# Patient Record
Sex: MALE | Race: WHITE | HISPANIC OR LATINO | Employment: OTHER | URBAN - METROPOLITAN AREA
[De-identification: names, ages, dates, MRNs, and addresses within clinical notes are randomized per-mention and may not be internally consistent; named-entity substitution may affect disease eponyms.]

---

## 2017-02-04 RX ORDER — LEVOTHYROXINE SODIUM 50 UG/1
TABLET ORAL
Qty: 30 TABLET | Refills: 11 | Status: SHIPPED | OUTPATIENT
Start: 2017-02-04 | End: 2017-08-04 | Stop reason: SDUPTHER

## 2017-03-08 RX ORDER — FINASTERIDE 5 MG/1
TABLET, FILM COATED ORAL
Qty: 30 TABLET | Refills: 2 | Status: SHIPPED | OUTPATIENT
Start: 2017-03-08 | End: 2017-06-19 | Stop reason: SDUPTHER

## 2017-04-24 RX ORDER — LISINOPRIL AND HYDROCHLOROTHIAZIDE 12.5; 2 MG/1; MG/1
TABLET ORAL
Qty: 60 TABLET | Refills: 6 | Status: SHIPPED | OUTPATIENT
Start: 2017-04-24 | End: 2017-04-26

## 2017-04-26 RX ORDER — LISINOPRIL AND HYDROCHLOROTHIAZIDE 12.5; 2 MG/1; MG/1
TABLET ORAL
Qty: 60 TABLET | Refills: 6 | Status: SHIPPED | OUTPATIENT
Start: 2017-04-26 | End: 2017-08-04 | Stop reason: SDUPTHER

## 2017-06-19 RX ORDER — FINASTERIDE 5 MG/1
TABLET, FILM COATED ORAL
Qty: 30 TABLET | Refills: 2 | Status: SHIPPED | OUTPATIENT
Start: 2017-06-19 | End: 2017-08-04 | Stop reason: SDUPTHER

## 2017-06-29 ENCOUNTER — OFFICE VISIT (OUTPATIENT)
Dept: OPTOMETRY | Facility: CLINIC | Age: 82
End: 2017-06-29
Payer: MEDICARE

## 2017-06-29 DIAGNOSIS — H52.203 MYOPIA WITH ASTIGMATISM AND PRESBYOPIA, BILATERAL: ICD-10-CM

## 2017-06-29 DIAGNOSIS — H35.371 EPIRETINAL MEMBRANE, RIGHT: Primary | ICD-10-CM

## 2017-06-29 DIAGNOSIS — H52.4 MYOPIA WITH ASTIGMATISM AND PRESBYOPIA, BILATERAL: ICD-10-CM

## 2017-06-29 DIAGNOSIS — H52.13 MYOPIA WITH ASTIGMATISM AND PRESBYOPIA, BILATERAL: ICD-10-CM

## 2017-06-29 DIAGNOSIS — H04.123 BILATERAL DRY EYES: ICD-10-CM

## 2017-06-29 PROCEDURE — 99999 PR PBB SHADOW E&M-EST. PATIENT-LVL II: CPT | Mod: PBBFAC,,, | Performed by: OPTOMETRIST

## 2017-06-29 PROCEDURE — 92004 COMPRE OPH EXAM NEW PT 1/>: CPT | Mod: S$GLB,,, | Performed by: OPTOMETRIST

## 2017-06-29 PROCEDURE — 92015 DETERMINE REFRACTIVE STATE: CPT | Mod: S$GLB,,, | Performed by: OPTOMETRIST

## 2017-06-29 NOTE — PROGRESS NOTES
LILIA FERRARO 08/2012. Hasn't noticed any vision change. Glasses about 5 yrs.   Old.Has to renew 's license in July.  Eyes get tired after working long hours x mos    Last edited by Issac De La Cruz, OD on 6/29/2017  4:36 PM. (History)        ROS     Negative for: Constitutional, Gastrointestinal, Neurological, Skin,   Genitourinary, Musculoskeletal, HENT, Endocrine, Cardiovascular, Eyes,   Respiratory, Psychiatric, Allergic/Imm, Heme/Lymph    Last edited by Issac De La Cruz, OD on 6/29/2017  4:03 PM. (History)        Assessment /Plan     For exam results, see Encounter Report.    Epiretinal membrane, right    Myopia with astigmatism and presbyopia, bilateral    Bilateral dry eyes      1. Mild. Monitor condition. Patient to report any changes. RTC 1 year recheck.  2. Spec Rx given. Different lens options discussed with patient. RTC 1 year full exam.  3. Recommend artificial tears. 1 drop 1-2x per day. Chronicity of disease and treatment discussed.

## 2017-07-12 ENCOUNTER — TELEPHONE (OUTPATIENT)
Dept: INTERNAL MEDICINE | Facility: CLINIC | Age: 82
End: 2017-07-12

## 2017-07-12 DIAGNOSIS — I10 ESSENTIAL HYPERTENSION, BENIGN: Primary | ICD-10-CM

## 2017-07-12 DIAGNOSIS — E03.9 HYPOTHYROIDISM, UNSPECIFIED TYPE: ICD-10-CM

## 2017-07-12 NOTE — TELEPHONE ENCOUNTER
----- Message from Abdiaziz Jimenes sent at 7/12/2017  3:08 PM CDT -----  Contact: HRA  Good afternoon. Pt requesting a call to schedule appointment with Dr. Padilla. Thanks.

## 2017-08-04 ENCOUNTER — OFFICE VISIT (OUTPATIENT)
Dept: INTERNAL MEDICINE | Facility: CLINIC | Age: 82
End: 2017-08-04
Payer: MEDICARE

## 2017-08-04 VITALS
SYSTOLIC BLOOD PRESSURE: 140 MMHG | RESPIRATION RATE: 16 BRPM | HEART RATE: 51 BPM | HEIGHT: 64 IN | WEIGHT: 167.75 LBS | TEMPERATURE: 98 F | DIASTOLIC BLOOD PRESSURE: 76 MMHG | BODY MASS INDEX: 28.64 KG/M2

## 2017-08-04 DIAGNOSIS — E03.9 HYPOTHYROIDISM, UNSPECIFIED TYPE: ICD-10-CM

## 2017-08-04 DIAGNOSIS — I10 HTN (HYPERTENSION), BENIGN: ICD-10-CM

## 2017-08-04 DIAGNOSIS — Z00.00 ENCOUNTER FOR PREVENTIVE HEALTH EXAMINATION: Primary | ICD-10-CM

## 2017-08-04 PROCEDURE — 99397 PER PM REEVAL EST PAT 65+ YR: CPT | Mod: S$GLB,,, | Performed by: INTERNAL MEDICINE

## 2017-08-04 PROCEDURE — 99999 PR PBB SHADOW E&M-EST. PATIENT-LVL III: CPT | Mod: PBBFAC,,, | Performed by: INTERNAL MEDICINE

## 2017-08-04 PROCEDURE — 99499 UNLISTED E&M SERVICE: CPT | Mod: S$GLB,,, | Performed by: INTERNAL MEDICINE

## 2017-08-04 RX ORDER — LEVOTHYROXINE SODIUM 50 UG/1
50 TABLET ORAL DAILY
Qty: 30 TABLET | Refills: 11 | Status: SHIPPED | OUTPATIENT
Start: 2017-08-04 | End: 2018-08-26 | Stop reason: SDUPTHER

## 2017-08-04 RX ORDER — LISINOPRIL AND HYDROCHLOROTHIAZIDE 12.5; 2 MG/1; MG/1
2 TABLET ORAL DAILY
Qty: 60 TABLET | Refills: 6 | Status: SHIPPED | OUTPATIENT
Start: 2017-08-04 | End: 2018-06-01

## 2017-08-04 RX ORDER — FINASTERIDE 5 MG/1
5 TABLET, FILM COATED ORAL DAILY
Qty: 30 TABLET | Refills: 2 | Status: SHIPPED | OUTPATIENT
Start: 2017-08-04 | End: 2017-12-09 | Stop reason: SDUPTHER

## 2017-08-04 NOTE — PROGRESS NOTES
History of present illness:  84-year-old male in today for general health assessment.  Next    Current medication:  All medications noted and reviewed in the electronic medical record medication list.    Review of systems:  General: no fever, chills, generalized body aches. No unexpected weight loss.  Eyes:  No visual disturbances.  HEENT:  No hoarseness, dysphagia, ear pain.  Respiratory:  No cough, no shortness of breath.  Cardiovascular: no chest pain, palpitations, cough, exertional limb pain. No edema.  GI: no nausea, vomiting.  No abdominal pain. No change in bowel habits.  No melena, no hematochezia.  : no dysuria. No change in the color or character of the urine. No urinary frequency.  Musculoskeletal: He has seen orthopedics recently for knee pain.  Neurologic:  No focal neurological complaints.  No headaches.  Skin:  No rashes or other concerns.  Psych:  No emotional issues    Past medical history:  Hypothyroidism  Hypertension  BPH  Past surgical history, family medical history and social histories are all noted reviewed and are electronic medical record history sections.    Health screenings:  I exam June 2017.  He declines all immunizations.    Physical examination:  GENERAL:  Alert, appropriately groomed, no acute distress.  VS: Blood pressure taken manually by this examiner is 156/78.  EYES: sclerae white ,nonicteric. PERRL.  HEENT:  Normocephalic. Ear canals and tympanic membranes normal. Mouth and pharynx normal. No thyromegaly. Trachea midline and freely mobile.  LUNGS:  Clear to ascultation and normal to percussion.  CARDIOVASCULAR:  Normal heart sounds.  No significant murmur. Carotids full bilaterally without bruit.  Pedal pulses intact .  No abdominal bruit.  No peripheral extremity edema.  GI: the abdomen is soft, no distension. No masses , tenderness, organomegaly.  : Bilateral hydrocele.  Penis normal.  LYMPHATIC:  No axillary, inguinal , cervical adenopathy.  MUSCULOSKELETAL:  Range of  motion, stability and strength of the right and left upper and lower extremities normal. No swollen or tender joints  NEUROLOGIC:  DTR's normal. No gross motor or sensory deficits apparent, gait normal.  SKIN:  No rashes.   MS:  Alert, oriented , affect and mood all appropriate    Impression:  84-year-old male with history of hypertension control is uncertain.  He reports that his nurse daughter takes his blood pressure about every 2 weeks and states that it's okay.  Elevated reading here in the office.  Hypothyroidism on replacement therapy.  BPH.  Osteoarthritis.    Plan:  Update health maintenance laboratory data to include CBC chemistry profile lipid profile TSH urinalysis.  He is advised to start recording his blood pressure readings and his daughter takes and see us back in 2-3 months with those readings for review and recheck.  He declines any immunization updates.

## 2017-08-05 ENCOUNTER — LAB VISIT (OUTPATIENT)
Dept: LAB | Facility: HOSPITAL | Age: 82
End: 2017-08-05
Attending: INTERNAL MEDICINE
Payer: MEDICARE

## 2017-08-05 DIAGNOSIS — I10 ESSENTIAL HYPERTENSION, BENIGN: ICD-10-CM

## 2017-08-05 DIAGNOSIS — E03.9 HYPOTHYROIDISM, UNSPECIFIED TYPE: ICD-10-CM

## 2017-08-05 LAB
ALBUMIN SERPL BCP-MCNC: 3.8 G/DL
ALP SERPL-CCNC: 75 U/L
ALT SERPL W/O P-5'-P-CCNC: 13 U/L
ANION GAP SERPL CALC-SCNC: 10 MMOL/L
AST SERPL-CCNC: 20 U/L
BASOPHILS # BLD AUTO: 0.02 K/UL
BASOPHILS NFR BLD: 0.3 %
BILIRUB SERPL-MCNC: 0.6 MG/DL
BUN SERPL-MCNC: 18 MG/DL
CALCIUM SERPL-MCNC: 9.8 MG/DL
CHLORIDE SERPL-SCNC: 104 MMOL/L
CHOLEST/HDLC SERPL: 4.1 {RATIO}
CO2 SERPL-SCNC: 27 MMOL/L
CREAT SERPL-MCNC: 1.2 MG/DL
DIFFERENTIAL METHOD: ABNORMAL
EOSINOPHIL # BLD AUTO: 0.2 K/UL
EOSINOPHIL NFR BLD: 2.7 %
ERYTHROCYTE [DISTWIDTH] IN BLOOD BY AUTOMATED COUNT: 14.5 %
EST. GFR  (AFRICAN AMERICAN): >60 ML/MIN/1.73 M^2
EST. GFR  (NON AFRICAN AMERICAN): 55.2 ML/MIN/1.73 M^2
GLUCOSE SERPL-MCNC: 86 MG/DL
HCT VFR BLD AUTO: 44.5 %
HDL/CHOLESTEROL RATIO: 24.2 %
HDLC SERPL-MCNC: 207 MG/DL
HDLC SERPL-MCNC: 50 MG/DL
HGB BLD-MCNC: 14.3 G/DL
LDLC SERPL CALC-MCNC: 134.2 MG/DL
LYMPHOCYTES # BLD AUTO: 1.3 K/UL
LYMPHOCYTES NFR BLD: 21.1 %
MCH RBC QN AUTO: 27.3 PG
MCHC RBC AUTO-ENTMCNC: 32.1 G/DL
MCV RBC AUTO: 85 FL
MONOCYTES # BLD AUTO: 0.6 K/UL
MONOCYTES NFR BLD: 9 %
NEUTROPHILS # BLD AUTO: 4.3 K/UL
NEUTROPHILS NFR BLD: 66.9 %
NONHDLC SERPL-MCNC: 157 MG/DL
PLATELET # BLD AUTO: 243 K/UL
PMV BLD AUTO: 14.3 FL
POTASSIUM SERPL-SCNC: 4.2 MMOL/L
PROT SERPL-MCNC: 7.1 G/DL
RBC # BLD AUTO: 5.24 M/UL
SODIUM SERPL-SCNC: 141 MMOL/L
TRIGL SERPL-MCNC: 114 MG/DL
TSH SERPL DL<=0.005 MIU/L-ACNC: 3.92 UIU/ML
WBC # BLD AUTO: 6.36 K/UL

## 2017-08-05 PROCEDURE — 80053 COMPREHEN METABOLIC PANEL: CPT

## 2017-08-05 PROCEDURE — 85025 COMPLETE CBC W/AUTO DIFF WBC: CPT

## 2017-08-05 PROCEDURE — 84443 ASSAY THYROID STIM HORMONE: CPT

## 2017-08-05 PROCEDURE — 80061 LIPID PANEL: CPT

## 2017-08-05 PROCEDURE — 36415 COLL VENOUS BLD VENIPUNCTURE: CPT | Mod: PO

## 2017-12-11 RX ORDER — FINASTERIDE 5 MG/1
5 TABLET, FILM COATED ORAL DAILY
Qty: 30 TABLET | Refills: 6 | Status: SHIPPED | OUTPATIENT
Start: 2017-12-11 | End: 2018-07-03 | Stop reason: SDUPTHER

## 2018-01-22 ENCOUNTER — PES CALL (OUTPATIENT)
Dept: ADMINISTRATIVE | Facility: CLINIC | Age: 83
End: 2018-01-22

## 2018-05-16 ENCOUNTER — PES CALL (OUTPATIENT)
Dept: ADMINISTRATIVE | Facility: CLINIC | Age: 83
End: 2018-05-16

## 2018-06-01 RX ORDER — LISINOPRIL AND HYDROCHLOROTHIAZIDE 12.5; 2 MG/1; MG/1
TABLET ORAL
Qty: 60 TABLET | Refills: 1 | Status: SHIPPED | OUTPATIENT
Start: 2018-06-01 | End: 2018-07-31 | Stop reason: SDUPTHER

## 2018-07-03 RX ORDER — FINASTERIDE 5 MG/1
5 TABLET, FILM COATED ORAL DAILY
Qty: 30 TABLET | Refills: 3 | Status: SHIPPED | OUTPATIENT
Start: 2018-07-03 | End: 2018-09-29 | Stop reason: SDUPTHER

## 2018-08-01 RX ORDER — LISINOPRIL AND HYDROCHLOROTHIAZIDE 12.5; 2 MG/1; MG/1
TABLET ORAL
Qty: 60 TABLET | Refills: 1 | Status: SHIPPED | OUTPATIENT
Start: 2018-08-01 | End: 2018-09-26 | Stop reason: SDUPTHER

## 2018-08-27 RX ORDER — LEVOTHYROXINE SODIUM 50 UG/1
50 TABLET ORAL DAILY
Qty: 30 TABLET | Refills: 1 | Status: SHIPPED | OUTPATIENT
Start: 2018-08-27 | End: 2018-10-24 | Stop reason: SDUPTHER

## 2018-09-26 RX ORDER — LISINOPRIL AND HYDROCHLOROTHIAZIDE 12.5; 2 MG/1; MG/1
TABLET ORAL
Qty: 60 TABLET | Refills: 1 | Status: SHIPPED | OUTPATIENT
Start: 2018-09-26 | End: 2018-11-19 | Stop reason: SDUPTHER

## 2018-10-01 RX ORDER — FINASTERIDE 5 MG/1
TABLET, FILM COATED ORAL
Qty: 30 TABLET | Refills: 0 | Status: SHIPPED | OUTPATIENT
Start: 2018-10-01 | End: 2018-11-04 | Stop reason: SDUPTHER

## 2018-10-05 ENCOUNTER — OFFICE VISIT (OUTPATIENT)
Dept: INTERNAL MEDICINE | Facility: CLINIC | Age: 83
End: 2018-10-05
Payer: MEDICARE

## 2018-10-05 ENCOUNTER — LAB VISIT (OUTPATIENT)
Dept: LAB | Facility: HOSPITAL | Age: 83
End: 2018-10-05
Attending: INTERNAL MEDICINE
Payer: MEDICARE

## 2018-10-05 VITALS
HEART RATE: 47 BPM | RESPIRATION RATE: 18 BRPM | WEIGHT: 161.81 LBS | BODY MASS INDEX: 27.63 KG/M2 | HEIGHT: 64 IN | SYSTOLIC BLOOD PRESSURE: 124 MMHG | DIASTOLIC BLOOD PRESSURE: 78 MMHG | TEMPERATURE: 98 F

## 2018-10-05 DIAGNOSIS — M17.0 OSTEOARTHRITIS OF BOTH KNEES, UNSPECIFIED OSTEOARTHRITIS TYPE: ICD-10-CM

## 2018-10-05 DIAGNOSIS — E03.9 HYPOTHYROIDISM, UNSPECIFIED TYPE: ICD-10-CM

## 2018-10-05 DIAGNOSIS — I10 HTN (HYPERTENSION), BENIGN: ICD-10-CM

## 2018-10-05 DIAGNOSIS — Z00.00 ENCOUNTER FOR PREVENTIVE HEALTH EXAMINATION: Primary | ICD-10-CM

## 2018-10-05 DIAGNOSIS — Z12.5 PROSTATE CANCER SCREENING: ICD-10-CM

## 2018-10-05 LAB
ALBUMIN SERPL BCP-MCNC: 3.8 G/DL
ALP SERPL-CCNC: 78 U/L
ALT SERPL W/O P-5'-P-CCNC: 12 U/L
ANION GAP SERPL CALC-SCNC: 8 MMOL/L
AST SERPL-CCNC: 23 U/L
BASOPHILS # BLD AUTO: 0.02 K/UL
BASOPHILS NFR BLD: 0.3 %
BILIRUB SERPL-MCNC: 0.6 MG/DL
BUN SERPL-MCNC: 23 MG/DL
CALCIUM SERPL-MCNC: 9.7 MG/DL
CHLORIDE SERPL-SCNC: 105 MMOL/L
CHOLEST SERPL-MCNC: 184 MG/DL
CHOLEST/HDLC SERPL: 3.9 {RATIO}
CO2 SERPL-SCNC: 28 MMOL/L
COMPLEXED PSA SERPL-MCNC: 4.2 NG/ML
CREAT SERPL-MCNC: 1.3 MG/DL
DIFFERENTIAL METHOD: ABNORMAL
EOSINOPHIL # BLD AUTO: 0.2 K/UL
EOSINOPHIL NFR BLD: 3.6 %
ERYTHROCYTE [DISTWIDTH] IN BLOOD BY AUTOMATED COUNT: 14.3 %
EST. GFR  (AFRICAN AMERICAN): 57.5 ML/MIN/1.73 M^2
EST. GFR  (NON AFRICAN AMERICAN): 49.8 ML/MIN/1.73 M^2
GLUCOSE SERPL-MCNC: 84 MG/DL
HCT VFR BLD AUTO: 43.5 %
HDLC SERPL-MCNC: 47 MG/DL
HDLC SERPL: 25.5 %
HGB BLD-MCNC: 13.3 G/DL
IMM GRANULOCYTES # BLD AUTO: 0.01 K/UL
IMM GRANULOCYTES NFR BLD AUTO: 0.2 %
LDLC SERPL CALC-MCNC: 117.4 MG/DL
LYMPHOCYTES # BLD AUTO: 1.4 K/UL
LYMPHOCYTES NFR BLD: 24.4 %
MCH RBC QN AUTO: 26.5 PG
MCHC RBC AUTO-ENTMCNC: 30.6 G/DL
MCV RBC AUTO: 87 FL
MONOCYTES # BLD AUTO: 0.6 K/UL
MONOCYTES NFR BLD: 10.1 %
NEUTROPHILS # BLD AUTO: 3.5 K/UL
NEUTROPHILS NFR BLD: 61.4 %
NONHDLC SERPL-MCNC: 137 MG/DL
NRBC BLD-RTO: 0 /100 WBC
PLATELET # BLD AUTO: 265 K/UL
PMV BLD AUTO: 14.7 FL
POTASSIUM SERPL-SCNC: 4.4 MMOL/L
PROT SERPL-MCNC: 6.9 G/DL
RBC # BLD AUTO: 5.02 M/UL
SODIUM SERPL-SCNC: 141 MMOL/L
TRIGL SERPL-MCNC: 98 MG/DL
TSH SERPL DL<=0.005 MIU/L-ACNC: 3.34 UIU/ML
WBC # BLD AUTO: 5.77 K/UL

## 2018-10-05 PROCEDURE — 80061 LIPID PANEL: CPT

## 2018-10-05 PROCEDURE — 84153 ASSAY OF PSA TOTAL: CPT

## 2018-10-05 PROCEDURE — 3074F SYST BP LT 130 MM HG: CPT | Mod: CPTII,,, | Performed by: INTERNAL MEDICINE

## 2018-10-05 PROCEDURE — 99213 OFFICE O/P EST LOW 20 MIN: CPT | Mod: PBBFAC,PO,25 | Performed by: INTERNAL MEDICINE

## 2018-10-05 PROCEDURE — 80053 COMPREHEN METABOLIC PANEL: CPT

## 2018-10-05 PROCEDURE — 84443 ASSAY THYROID STIM HORMONE: CPT

## 2018-10-05 PROCEDURE — 36415 COLL VENOUS BLD VENIPUNCTURE: CPT | Mod: PO

## 2018-10-05 PROCEDURE — 85025 COMPLETE CBC W/AUTO DIFF WBC: CPT

## 2018-10-05 PROCEDURE — 99999 PR PBB SHADOW E&M-EST. PATIENT-LVL III: CPT | Mod: PBBFAC,,, | Performed by: INTERNAL MEDICINE

## 2018-10-05 PROCEDURE — 90670 PCV13 VACCINE IM: CPT | Mod: PBBFAC,PO

## 2018-10-05 PROCEDURE — 3078F DIAST BP <80 MM HG: CPT | Mod: CPTII,,, | Performed by: INTERNAL MEDICINE

## 2018-10-05 PROCEDURE — 99397 PER PM REEVAL EST PAT 65+ YR: CPT | Mod: S$PBB,,, | Performed by: INTERNAL MEDICINE

## 2018-10-05 PROCEDURE — 90662 IIV NO PRSV INCREASED AG IM: CPT | Mod: PBBFAC,PO

## 2018-10-05 NOTE — PROGRESS NOTES
History of present illness:  85-year-old gentleman in today for general health assessment.    Current medications:  Medications noted reviewed and confirmed the electronic medical record medication list.    Review of systems:  General: no fever, chills, generalized body aches. No unexpected weight loss.  Eyes:  No visual disturbances.  HEENT:  No hoarseness, dysphagia, ear pain.  Respiratory:  No cough, no shortness of breath.  Cardiovascular: no chest pain, palpitations, cough, exertional limb pain. No edema.  GI: no nausea, vomiting.  No abdominal pain. No change in bowel habits.  No melena, no hematochezia.  : no dysuria. No change in the color or character of the urine. No urinary frequency.  Musculoskeletal:  Chronic bilateral knee pain due to osteoarthritis.  Neurologic:  No focal neurological complaints.  No headaches.  Skin:  No rashes or other concerns.  Psych:  No emotional issues  .  Past medical history, past surgical history, family medical history social history is are all noted and reviewed in the electronic medical record history sections.    Health screenings:  He has not had pneumococcal vaccines having decline such previously.  Eye exam June 2017    Physical examination:  GENERAL:  Alert, appropriately groomed, no acute distress.  VS:  Blood pressure taken manually 124/78.  EYES: sclerae white ,nonicteric. PERRL.  HEENT:  Normocephalic. Ear canals and tympanic membranes normal. Mouth and pharynx normal. No thyromegaly. Trachea midline and freely mobile.  LUNGS:  Clear to ascultation and normal to percussion.  CARDIOVASCULAR:  Normal heart sounds.  No significant murmur. Carotids full bilaterally without bruit.  Pedal pulses intact .  No abdominal bruit.  No peripheral extremity edema.  GI: the abdomen is soft, no distension. No masses , tenderness, organomegaly. .  :  Bilateral hydrocele.  LYMPHATIC:  No axillary, inguinal , cervical adenopathy.  MUSCULOSKELETAL:  Range of motion, stability  and strength of the right and left upper and lower extremities normal. No swollen or tender joints  NEUROLOGIC:  DTR's normal. No gross motor or sensory deficits apparent, gait normal.  SKIN:  No rashes.   MS:  Alert, oriented , affect and mood all appropriate    Impression:  Generally healthy 85-year-old male living a reasonably healthy lifestyle.  Hypertension is well controlled.  Hypothyroidism on replacement therapy.  BPH on finasteride.  Osteoarthritis both knees.    Plan:  Update health maintenance laboratory data to include a CBC, chemistry profile, lipid profile, TSH, urinalysis, PSA.  Influenza vaccine.  Prevnar 13.  Return to clinic 6 months.

## 2018-10-24 RX ORDER — LEVOTHYROXINE SODIUM 50 UG/1
TABLET ORAL
Qty: 30 TABLET | Refills: 1 | Status: SHIPPED | OUTPATIENT
Start: 2018-10-24 | End: 2018-12-12 | Stop reason: SDUPTHER

## 2018-11-05 RX ORDER — FINASTERIDE 5 MG/1
TABLET, FILM COATED ORAL
Qty: 30 TABLET | Refills: 0 | Status: SHIPPED | OUTPATIENT
Start: 2018-11-05 | End: 2018-12-03 | Stop reason: SDUPTHER

## 2018-11-19 RX ORDER — LISINOPRIL AND HYDROCHLOROTHIAZIDE 12.5; 2 MG/1; MG/1
TABLET ORAL
Qty: 60 TABLET | Refills: 1 | Status: SHIPPED | OUTPATIENT
Start: 2018-11-19 | End: 2018-12-12 | Stop reason: SDUPTHER

## 2018-12-03 RX ORDER — FINASTERIDE 5 MG/1
TABLET, FILM COATED ORAL
Qty: 30 TABLET | Refills: 0 | Status: SHIPPED | OUTPATIENT
Start: 2018-12-03 | End: 2018-12-12 | Stop reason: SDUPTHER

## 2018-12-12 RX ORDER — FINASTERIDE 5 MG/1
5 TABLET, FILM COATED ORAL DAILY
Qty: 30 TABLET | Refills: 0 | Status: SHIPPED | OUTPATIENT
Start: 2018-12-12 | End: 2018-12-26 | Stop reason: SDUPTHER

## 2018-12-12 RX ORDER — LISINOPRIL AND HYDROCHLOROTHIAZIDE 12.5; 2 MG/1; MG/1
2 TABLET ORAL DAILY
Qty: 60 TABLET | Refills: 1 | Status: SHIPPED | OUTPATIENT
Start: 2018-12-12 | End: 2018-12-26 | Stop reason: SDUPTHER

## 2018-12-12 RX ORDER — LEVOTHYROXINE SODIUM 50 UG/1
50 TABLET ORAL DAILY
Qty: 30 TABLET | Refills: 1 | Status: SHIPPED | OUTPATIENT
Start: 2018-12-12 | End: 2018-12-26 | Stop reason: SDUPTHER

## 2018-12-12 NOTE — TELEPHONE ENCOUNTER
----- Message from Shayy Anand sent at 12/12/2018 11:06 AM CST -----  Contact: Lalo Samuel [phone]  Lalo is requesting refills on his medications. Lalo was not able to provide the names of his medications , I did try to ask from his medications he states he does not know & Dr. Padilla will know because he was supposed to refill the medication at his last appointment on 10/5/2018

## 2018-12-26 ENCOUNTER — OFFICE VISIT (OUTPATIENT)
Dept: INTERNAL MEDICINE | Facility: CLINIC | Age: 83
End: 2018-12-26
Payer: MEDICARE

## 2018-12-26 VITALS
OXYGEN SATURATION: 96 % | WEIGHT: 168.19 LBS | BODY MASS INDEX: 28.02 KG/M2 | DIASTOLIC BLOOD PRESSURE: 68 MMHG | RESPIRATION RATE: 18 BRPM | HEIGHT: 65 IN | TEMPERATURE: 99 F | SYSTOLIC BLOOD PRESSURE: 120 MMHG | HEART RATE: 69 BPM

## 2018-12-26 DIAGNOSIS — M17.0 OSTEOARTHRITIS OF BOTH KNEES, UNSPECIFIED OSTEOARTHRITIS TYPE: ICD-10-CM

## 2018-12-26 DIAGNOSIS — L98.9 SCALP LESION: ICD-10-CM

## 2018-12-26 DIAGNOSIS — E03.9 HYPOTHYROIDISM, UNSPECIFIED TYPE: ICD-10-CM

## 2018-12-26 DIAGNOSIS — I10 HTN (HYPERTENSION), BENIGN: Primary | ICD-10-CM

## 2018-12-26 PROCEDURE — 99214 OFFICE O/P EST MOD 30 MIN: CPT | Mod: HCNC,S$GLB,, | Performed by: INTERNAL MEDICINE

## 2018-12-26 PROCEDURE — 99499 UNLISTED E&M SERVICE: CPT | Mod: HCNC,S$GLB,, | Performed by: INTERNAL MEDICINE

## 2018-12-26 PROCEDURE — 3074F SYST BP LT 130 MM HG: CPT | Mod: CPTII,HCNC,S$GLB, | Performed by: INTERNAL MEDICINE

## 2018-12-26 PROCEDURE — 1101F PT FALLS ASSESS-DOCD LE1/YR: CPT | Mod: CPTII,HCNC,S$GLB, | Performed by: INTERNAL MEDICINE

## 2018-12-26 PROCEDURE — 3078F DIAST BP <80 MM HG: CPT | Mod: CPTII,HCNC,S$GLB, | Performed by: INTERNAL MEDICINE

## 2018-12-26 PROCEDURE — 99999 PR PBB SHADOW E&M-EST. PATIENT-LVL IV: CPT | Mod: PBBFAC,HCNC,, | Performed by: INTERNAL MEDICINE

## 2018-12-26 RX ORDER — LISINOPRIL AND HYDROCHLOROTHIAZIDE 12.5; 2 MG/1; MG/1
2 TABLET ORAL DAILY
Qty: 180 TABLET | Refills: 3 | Status: SHIPPED | OUTPATIENT
Start: 2018-12-26 | End: 2019-07-05

## 2018-12-26 RX ORDER — TRAMADOL HYDROCHLORIDE 50 MG/1
50 TABLET ORAL EVERY 6 HOURS PRN
Qty: 30 TABLET | Refills: 0 | Status: ON HOLD | OUTPATIENT
Start: 2018-12-26 | End: 2022-01-01 | Stop reason: ALTCHOICE

## 2018-12-26 RX ORDER — LEVOTHYROXINE SODIUM 50 UG/1
50 TABLET ORAL DAILY
Qty: 90 TABLET | Refills: 3 | Status: SHIPPED | OUTPATIENT
Start: 2018-12-26 | End: 2019-12-12 | Stop reason: SDUPTHER

## 2018-12-26 RX ORDER — FINASTERIDE 5 MG/1
5 TABLET, FILM COATED ORAL DAILY
Qty: 90 TABLET | Refills: 3 | Status: SHIPPED | OUTPATIENT
Start: 2018-12-26 | End: 2019-12-12 | Stop reason: SDUPTHER

## 2018-12-27 NOTE — PROGRESS NOTES
History of present illness:  85-year-old gentleman with hypertension, hypothyroidism following up on those issues and also has a couple of issues.  The patient seen in October this year for his annual assessment.  He needs some refills today.  He also limited discussed his ongoing chronic knee pain from osteoarthritis.  He has had orthopedic evaluation the corticosteroid injections, viscosupplementation.  He has been told in the HD replacement use planning to pursue that.  Anti-inflammatories are mildly effective.  He feels like eating something have on hand to use for pain it exacerbating.    Current medications:  All medications are noted and reviewed in the electronic medical record medication list.    Review of systems:  Respiratory:  No cough shortness of breath.  Cardiovascular:  No chest pain palpitations syncope claudication or edema.  GI system:  No nausea, vomiting or diarrhea.  Skin:  Patient reports having had minor trauma to his scalp 2 years ago.  He states he struck a nail:  The ceiling fan.  He states he had irritated lesion since that.    Past medical history, surgical history, family medical history social history is are all noted and reviewed in the electronic medical record history sections.    Physical examination:  General:  Alert pleasant appropriately groomed male no acute distress.  Vital signs:  All noted reviewed is normal.  HEENT normocephalic neck supple no masses see skin exam  Lungs:  Clear to auscultation  Cardiovascular:  Regular rate and rhythm. No significant murmur.  Carotids full bilaterally without bruits.  No significant peripheral extremity edema  Musculoskeletal-knees:  These are not hot or tender to palpation.  Skin:  Vertex of the scalp there is a lesion about 3/4 of a cm of superficial excoriated changes.  No fluctuance.    Impression:  Hypertension well controlled.  Hypothyroidism replacement therapy.  Osteoarthritis knees advanced.  Persistent chronic scalp lesion  appears inflamed.    Plan:  Referral to Dermatology regarding his scalp lesion.  Discussed use of p.r.n. tramadol for his knee pain is given a prescription for such.  Return to clinic 4 months.  Follow-up

## 2019-02-21 ENCOUNTER — TELEPHONE (OUTPATIENT)
Dept: INTERNAL MEDICINE | Facility: CLINIC | Age: 84
End: 2019-02-21

## 2019-02-21 NOTE — TELEPHONE ENCOUNTER
----- Message from Annie Watters sent at 2/21/2019 11:09 AM CST -----  Contact: patient [phone]  Patient would like to speak with Juan because he keeps receiving calls about scheduling an appointment

## 2019-04-04 ENCOUNTER — OFFICE VISIT (OUTPATIENT)
Dept: INTERNAL MEDICINE | Facility: CLINIC | Age: 84
End: 2019-04-04
Payer: MEDICARE

## 2019-04-04 DIAGNOSIS — I10 HTN (HYPERTENSION), BENIGN: Primary | ICD-10-CM

## 2019-04-04 DIAGNOSIS — R25.2 LEG CRAMPS: ICD-10-CM

## 2019-04-04 PROCEDURE — 99999 PR PBB SHADOW E&M-EST. PATIENT-LVL III: ICD-10-PCS | Mod: PBBFAC,HCNC,, | Performed by: INTERNAL MEDICINE

## 2019-04-04 PROCEDURE — 99213 PR OFFICE/OUTPT VISIT, EST, LEVL III, 20-29 MIN: ICD-10-PCS | Mod: HCNC,S$GLB,, | Performed by: INTERNAL MEDICINE

## 2019-04-04 PROCEDURE — 99213 OFFICE O/P EST LOW 20 MIN: CPT | Mod: HCNC,S$GLB,, | Performed by: INTERNAL MEDICINE

## 2019-04-04 PROCEDURE — 3075F PR MOST RECENT SYSTOLIC BLOOD PRESS GE 130-139MM HG: ICD-10-PCS | Mod: HCNC,CPTII,S$GLB, | Performed by: INTERNAL MEDICINE

## 2019-04-04 PROCEDURE — 1101F PR PT FALLS ASSESS DOC 0-1 FALLS W/OUT INJ PAST YR: ICD-10-PCS | Mod: HCNC,CPTII,S$GLB, | Performed by: INTERNAL MEDICINE

## 2019-04-04 PROCEDURE — 99999 PR PBB SHADOW E&M-EST. PATIENT-LVL III: CPT | Mod: PBBFAC,HCNC,, | Performed by: INTERNAL MEDICINE

## 2019-04-04 PROCEDURE — 3078F PR MOST RECENT DIASTOLIC BLOOD PRESSURE < 80 MM HG: ICD-10-PCS | Mod: HCNC,CPTII,S$GLB, | Performed by: INTERNAL MEDICINE

## 2019-04-04 PROCEDURE — 1101F PT FALLS ASSESS-DOCD LE1/YR: CPT | Mod: HCNC,CPTII,S$GLB, | Performed by: INTERNAL MEDICINE

## 2019-04-04 PROCEDURE — 3075F SYST BP GE 130 - 139MM HG: CPT | Mod: HCNC,CPTII,S$GLB, | Performed by: INTERNAL MEDICINE

## 2019-04-04 PROCEDURE — 3078F DIAST BP <80 MM HG: CPT | Mod: HCNC,CPTII,S$GLB, | Performed by: INTERNAL MEDICINE

## 2019-04-04 RX ORDER — AMLODIPINE BESYLATE 5 MG/1
5 TABLET ORAL DAILY
Qty: 30 TABLET | Refills: 11 | Status: SHIPPED | OUTPATIENT
Start: 2019-04-04 | End: 2020-04-11 | Stop reason: SDUPTHER

## 2019-07-05 ENCOUNTER — OFFICE VISIT (OUTPATIENT)
Dept: INTERNAL MEDICINE | Facility: CLINIC | Age: 84
End: 2019-07-05
Payer: MEDICARE

## 2019-07-05 DIAGNOSIS — E03.9 HYPOTHYROIDISM, UNSPECIFIED TYPE: ICD-10-CM

## 2019-07-05 DIAGNOSIS — I10 HTN (HYPERTENSION), BENIGN: Primary | ICD-10-CM

## 2019-07-05 DIAGNOSIS — L98.9 SCALP LESION: ICD-10-CM

## 2019-07-05 PROCEDURE — 99999 PR PBB SHADOW E&M-EST. PATIENT-LVL III: CPT | Mod: PBBFAC,HCNC,, | Performed by: INTERNAL MEDICINE

## 2019-07-05 PROCEDURE — 3078F PR MOST RECENT DIASTOLIC BLOOD PRESSURE < 80 MM HG: ICD-10-PCS | Mod: HCNC,CPTII,S$GLB, | Performed by: INTERNAL MEDICINE

## 2019-07-05 PROCEDURE — 3078F DIAST BP <80 MM HG: CPT | Mod: HCNC,CPTII,S$GLB, | Performed by: INTERNAL MEDICINE

## 2019-07-05 PROCEDURE — 3074F PR MOST RECENT SYSTOLIC BLOOD PRESSURE < 130 MM HG: ICD-10-PCS | Mod: HCNC,CPTII,S$GLB, | Performed by: INTERNAL MEDICINE

## 2019-07-05 PROCEDURE — 1101F PR PT FALLS ASSESS DOC 0-1 FALLS W/OUT INJ PAST YR: ICD-10-PCS | Mod: HCNC,CPTII,S$GLB, | Performed by: INTERNAL MEDICINE

## 2019-07-05 PROCEDURE — 99999 PR PBB SHADOW E&M-EST. PATIENT-LVL III: ICD-10-PCS | Mod: PBBFAC,HCNC,, | Performed by: INTERNAL MEDICINE

## 2019-07-05 PROCEDURE — 99213 OFFICE O/P EST LOW 20 MIN: CPT | Mod: HCNC,S$GLB,, | Performed by: INTERNAL MEDICINE

## 2019-07-05 PROCEDURE — 1101F PT FALLS ASSESS-DOCD LE1/YR: CPT | Mod: HCNC,CPTII,S$GLB, | Performed by: INTERNAL MEDICINE

## 2019-07-05 PROCEDURE — 99213 PR OFFICE/OUTPT VISIT, EST, LEVL III, 20-29 MIN: ICD-10-PCS | Mod: HCNC,S$GLB,, | Performed by: INTERNAL MEDICINE

## 2019-07-05 PROCEDURE — 3074F SYST BP LT 130 MM HG: CPT | Mod: HCNC,CPTII,S$GLB, | Performed by: INTERNAL MEDICINE

## 2019-07-08 VITALS
HEIGHT: 64 IN | TEMPERATURE: 98 F | BODY MASS INDEX: 27.85 KG/M2 | WEIGHT: 163.13 LBS | HEART RATE: 89 BPM | RESPIRATION RATE: 20 BRPM | SYSTOLIC BLOOD PRESSURE: 132 MMHG | DIASTOLIC BLOOD PRESSURE: 70 MMHG

## 2019-07-08 VITALS
HEART RATE: 64 BPM | BODY MASS INDEX: 27.92 KG/M2 | DIASTOLIC BLOOD PRESSURE: 64 MMHG | TEMPERATURE: 99 F | SYSTOLIC BLOOD PRESSURE: 120 MMHG | WEIGHT: 163.56 LBS | HEIGHT: 64 IN

## 2019-07-09 NOTE — PROGRESS NOTES
History of present illness:  [age] in general hypertension, hypothyroidism following up on those issues.  He also reports recently he has been having some bilateral off and on leg cramps.  Mostly when he is not active or at nighttime.  No new medications or supplements.  No changes in activities.    Current medications:  All medications are noted and reviewed in the electronic medical record medication list.    Review of systems:  Constitutional:  No fever no chills.  Cardiovascular:  No chest pain no palpitations no syncope.  Respiratory:  No cough shortness of breath.    Past medical history, past surgical history, family medical history social history is are all noted and reviewed in the electronic medical record history sections.    Physical examination:  General:  Alert appropriately groomed male no acute distress.  Vital signs:  Blood pressure taken manually is 132/70.  HEENT:  Normocephalic.  Neck supple no masses no thyromegaly.  Lungs:  Clear to auscultation.  Cardiovascular:  Regular rate and rhythm. No significant murmur.  Carotids full bilaterally bruits.  There are 2+ pedal pulses and no ischemic changes of the lower extremities.  No peripheral extremity edema.    Impression:  Hypertension reasonably controlled.  Hypothyroidism replacement therapy.  Intermittent leg cramps at rest.    Plan:  Discontinue his lisinopril hydrochlorothiazide preparation.  Begin amlodipine 5 mg daily.  Discussed trying coenzyme Q10.  Return to clinic in 8 weeks for follow-up.

## 2019-07-09 NOTE — PROGRESS NOTES
History of present illness:  85-year-old gentleman hypertension hypothyroidism following up from our visit in a couple of months ago at which time we adjusted his antihypertensive regimen placed him on amlodipine and discontinued his lisinopril.  He reports no side effects that he is aware of.  No lightheadedness dizziness syncope presyncope.  No leg edema.    Current medications:  All medications are noted and reviewed in the electronic medical record medication list.    Review of systems:  Cardiovascular:  No chest pain palpitations syncope presyncope claudication edema.  Respiratory:  No cough shortness of breath.    Past medical history, family medical history past surgical history and social history is are all noted and reviewed in the electronic medical record history sections.    Physical examination:  General alert male no acute distress.  Vital signs:  Blood pressure taken manually by this examiner is 120/64.  Pulse 60s and regular.  Cardiovascular:  Regular rate and rhythm. No significant murmur.  Carotids full bilaterally bruits.  No peripheral extremity edema.  Skin:  Is a raised cm lesion on scalp appears somewhat inflamed    Impression:  Hypertension appears to be well controlled.  Hypothyroidism on replacement therapy.  Scalp lesion these further evaluation.    Plan:  Continue current pharmacologic regimens.  Return to clinic in November of this year for annual health assessment.  Referral to Dermatology regarding scalp lesion.

## 2019-08-16 ENCOUNTER — INITIAL CONSULT (OUTPATIENT)
Dept: DERMATOLOGY | Facility: CLINIC | Age: 84
End: 2019-08-16
Payer: MEDICARE

## 2019-08-16 DIAGNOSIS — D48.5 NEOPLASM OF UNCERTAIN BEHAVIOR OF SKIN: Primary | ICD-10-CM

## 2019-08-16 DIAGNOSIS — L57.0 AK (ACTINIC KERATOSIS): ICD-10-CM

## 2019-08-16 PROCEDURE — 17000 PR DESTRUCTION(LASER SURGERY,CRYOSURGERY,CHEMOSURGERY),PREMALIGNANT LESIONS,FIRST LESION: ICD-10-PCS | Mod: 59,HCNC,S$GLB, | Performed by: DERMATOLOGY

## 2019-08-16 PROCEDURE — 11102 TANGNTL BX SKIN SINGLE LES: CPT | Mod: HCNC,S$GLB,, | Performed by: DERMATOLOGY

## 2019-08-16 PROCEDURE — 11102 PR TANGENTIAL BIOPSY, SKIN, SINGLE LESION: ICD-10-PCS | Mod: HCNC,S$GLB,, | Performed by: DERMATOLOGY

## 2019-08-16 PROCEDURE — 99499 NO LOS: ICD-10-PCS | Mod: HCNC,S$GLB,, | Performed by: DERMATOLOGY

## 2019-08-16 PROCEDURE — 88341 PR IHC OR ICC EACH ADD'L SINGLE ANTIBODY  STAINPR: ICD-10-PCS | Mod: 26,HCNC,, | Performed by: PATHOLOGY

## 2019-08-16 PROCEDURE — 11103 PR TANGENTIAL BIOPSY, SKIN, EA ADDTL LESION: ICD-10-PCS | Mod: HCNC,S$GLB,, | Performed by: DERMATOLOGY

## 2019-08-16 PROCEDURE — 17000 DESTRUCT PREMALG LESION: CPT | Mod: 59,HCNC,S$GLB, | Performed by: DERMATOLOGY

## 2019-08-16 PROCEDURE — 99999 PR PBB SHADOW E&M-EST. PATIENT-LVL III: CPT | Mod: PBBFAC,HCNC,, | Performed by: DERMATOLOGY

## 2019-08-16 PROCEDURE — 11103 TANGNTL BX SKIN EA SEP/ADDL: CPT | Mod: HCNC,S$GLB,, | Performed by: DERMATOLOGY

## 2019-08-16 PROCEDURE — 88305 TISSUE EXAM BY PATHOLOGIST: CPT | Mod: HCNC | Performed by: PATHOLOGY

## 2019-08-16 PROCEDURE — 88342 IMHCHEM/IMCYTCHM 1ST ANTB: CPT | Mod: 26,HCNC,, | Performed by: PATHOLOGY

## 2019-08-16 PROCEDURE — 88305 TISSUE SPECIMEN TO PATHOLOGY, DERMATOLOGY: ICD-10-PCS | Mod: 26,HCNC,, | Performed by: PATHOLOGY

## 2019-08-16 PROCEDURE — 88341 IMHCHEM/IMCYTCHM EA ADD ANTB: CPT | Mod: 26,HCNC,, | Performed by: PATHOLOGY

## 2019-08-16 PROCEDURE — 99999 PR PBB SHADOW E&M-EST. PATIENT-LVL III: ICD-10-PCS | Mod: PBBFAC,HCNC,, | Performed by: DERMATOLOGY

## 2019-08-16 PROCEDURE — 99499 UNLISTED E&M SERVICE: CPT | Mod: HCNC,S$GLB,, | Performed by: DERMATOLOGY

## 2019-08-16 PROCEDURE — 88342 TISSUE SPECIMEN TO PATHOLOGY, DERMATOLOGY: ICD-10-PCS | Mod: 26,HCNC,, | Performed by: PATHOLOGY

## 2019-08-16 NOTE — PATIENT INSTRUCTIONS
" Shave Biopsy Wound Care    Your doctor has performed a shave biopsy today.  A band aid and vaseline ointment has been placed over the site.  This should remain in place for 24 hours.  It is recommended that you keep the area dry for the first 24 hours.  After 24 hours, you may remove the band aid and wash the area with warm soap and water and apply Vaseline jelly.  Many patients prefer to use Neosporin or Bacitracin ointment.  This is acceptable; however, know that you can develop an allergy to this medication even if you have used it safely for years.  It is important to keep the area moist.  Letting it dry out and get air slows healing time, and will worsen the scar.  Band aid is optional after first 24 hours.      If you notice increasing redness, tenderness, pain, or yellow drainage at the biopsy site, please notify your doctor.  These are signs of an infection.    If your biopsy site is bleeding, apply firm pressure for 15 minutes straight.  Repeat for another 15 minutes, if it is still bleeding.   If the surgical site continues to bleed, then please contact your doctor.      For [facility] users:   You will receive a [facility] notification after the pathologist has finished reviewing your biopsy specimen. Pathology results, however, will not be released online so you will see a "no content" message. Once your dermatologist reviews and clinically correlates your biopsy results, you will either receive a letter in the mail with the results of a phone call from your doctor's office if further explanation or treatment is warranted.       [address] [place] [address]/ [phone] FAX/ [email or web address]      CRYOSURGERY      Your doctor has used a method called cryosurgery to treat your skin condition. Cryosurgery refers to the use of very cold substances to treat a variety of skin conditions such as warts, pre-skin cancers, molluscum contagiosum, sun spots, and several benign " growths. The substance we use in cryosurgery is liquid nitrogen and is so cold (-195 degrees Celsius) that is burns when administered.     Following treatment in the office, the skin may immediately burn and become red. You may find the area around the lesion is affected as well. It is sometimes necessary to treat not only the lesion, but a small area of the surrounding normal skin to achieve a good response.     A blister, and even a blood filled blister, may form after treatment.   This is a normal response. If the blister is painful, it is acceptable to sterilize a needle and with rubbing alcohol and gently pop the blister. It is important that you gently wash the area with soap and warm water as the blister fluid may contain wart virus if a wart was treated. Do no remove the roof of the blister.     The area treated can take anywhere from 1-3 weeks to heal. Healing time depends on the kind skin lesion treated, the location, and how aggressively the lesion was treated. It is recommended that the areas treated are covered with Vaseline or bacitracin ointment and a band-aid. If a band-aid is not practical, just ointment applied several times per day will do. Keeping these areas moist will speed the healing time.    Treatment with liquid nitrogen can leave a scar. In dark skin, it may be a light or dark scar, in light skin it may be a white or pink scar. These will generally fade with time, but may never go away completely.     If you have any concerns after your treatment, please feel free to call the office.       [facility] [place] [address]/ [phone] FAX/ [email or web address]

## 2019-08-16 NOTE — LETTER
August 16, 2019      JABIER Padilla MD  [address] [facility] [address] [place] [address]           [facility] - Dermatology  [address]  [place] [address]  Phone: [phone]  Fax: [phone]          Patient: Lalo Samuel   MR Number: [id number]   YOB: 1933   Date of Visit: 8/16/2019       Dear Dr. JABIER Padilla:    Thank you for referring Lalo Samuel to me for evaluation. Attached you will find relevant portions of my assessment and plan of care.    If you have questions, please do not hesitate to call me. I look forward to following Lalo Samuel along with you.    Sincerely,    Sarah Claudio MD    Enclosure  CC:  No Recipients    If you would like to receive this communication electronically, please contact [email or web address] or [phone] to request more information on [facility] access.    For providers and/or their staff who would like to refer a patient to [facility], please contact us through our one-stop-shop provider referral line, [facility], at [phone].    If you feel you have received this communication in error or would no longer like to receive these types of communications, please e-mail [email or web address]

## 2019-08-16 NOTE — PROGRESS NOTES
Subjective:       Patient ID:  Lalo Samuel is a 86 y.o. male who presents for   Chief Complaint   Patient presents with    Lesion     scalp     No h/o nmsc     Lesion  - Initial  Affected locations: scalp  Duration: 2 years  Signs / symptoms: scabs and bleeding  Aggravated by: scratching  Relieving factors/Treatments tried: nothing        Review of Systems   Skin: Negative for daily sunscreen use, activity-related sunscreen use, recent sunburn and wears hat.   Hematologic/Lymphatic: Bruises/bleeds easily.        Objective:    Physical Exam   Constitutional: He appears well-developed and well-nourished. No distress.   Neurological: He is alert and oriented to person, place, and time. He is not disoriented.   Psychiatric: He has a normal mood and affect.   Skin:   Areas Examined (abnormalities noted in diagram):   Scalp / Hair Palpated and Inspected  Head / Face Inspection Performed  Neck Inspection Performed              Diagram Legend     Erythematous scaling macule/papule c/w actinic keratosis       Vascular papule c/w angioma      Pigmented verrucoid papule/plaque c/w seborrheic keratosis      Yellow umbilicated papule c/w sebaceous hyperplasia      Irregularly shaped tan macule c/w lentigo     1-2 mm smooth white papules consistent with Milia      Movable subcutaneous cyst with punctum c/w epidermal inclusion cyst      Subcutaneous movable cyst c/w pilar cyst      Firm pink to brown papule c/w dermatofibroma      Pedunculated fleshy papule(s) c/w skin tag(s)      Evenly pigmented macule c/w junctional nevus     Mildly variegated pigmented, slightly irregular-bordered macule c/w mildly atypical nevus      Flesh colored to evenly pigmented papule c/w intradermal nevus       Pink pearly papule/plaque c/w basal cell carcinoma      Erythematous hyperkeratotic cursted plaque c/w SCC      Surgical scar with no sign of skin cancer recurrence      Open and closed comedones      Inflammatory papules and pustules       Verrucoid papule consistent consistent with wart     Erythematous eczematous patches and plaques     Dystrophic onycholytic nail with subungual debris c/w onychomycosis     Umbilicated papule    Erythematous-base heme-crusted tan verrucoid plaque consistent with inflamed seborrheic keratosis     Erythematous Silvery Scaling Plaque c/w Psoriasis     See annotation                  Assessment / Plan:      Pathology Orders:     Normal Orders This Visit    Tissue Specimen To Pathology, Dermatology     Questions:    Directional Terms:  Other(comment)    Clinical Information:  r/o scc    Specific Site:  right scalp    Tissue Specimen To Pathology, Dermatology     Questions:    Directional Terms:  Other(comment)    Clinical Information:  r/o bcc    Specific Site:  right lateral forehead    Tissue Specimen To Pathology, Dermatology     Questions:    Directional Terms:  Other(comment)    Clinical Information:  r/o bcc superficial    Specific Site:  right lateral to eye    Tissue Specimen To Pathology, Dermatology     Questions:    Directional Terms:  Other(comment)    Clinical Information:  r/o bcc    Specific Site:  right chin    Tissue Specimen To Pathology, Dermatology     Questions:    Directional Terms:  Other(comment)    Clinical Information:  r/o bcc    Specific Site:  left nose        Neoplasm of uncertain behavior of skin  -     Tissue Specimen To Pathology, Dermatology  -     Tissue Specimen To Pathology, Dermatology  -     Tissue Specimen To Pathology, Dermatology  -     Tissue Specimen To Pathology, Dermatology  -     Tissue Specimen To Pathology, Dermatology    Shave biopsy procedure note:    Shave biopsy x 5 performed after verbal consent including risk of infection, scar, recurrence, need for additional treatment of site. Area prepped with alcohol, anesthetized with approximately 1.0cc of 1% lidocaine with epinephrine. Lesional tissue shaved with razor blade. Hemostasis achieved with application of  aluminum chloride followed by hyfrecation. No complications. Dressing applied. Wound care explained.    If biopsy positive for malignancy, refer to Dr. Reyna for Mohs surgery consultation.      AK (actinic keratosis)  Cryosurgery Procedure Note    Verbal consent from the patient is obtained including, but not limited to, risk of hypopigmentation/hyperpigmentation, scar, recurrence of lesion. The patient is aware of the precancerous quality and need for treatment of these lesions. Liquid nitrogen cryosurgery is applied to the 1 actinic keratoses, as detailed in the physical exam, to produce a freeze injury. The patient is aware that blisters may form and is instructed on wound care with gentle cleansing and use of vaseline ointment to keep moist until healed. The patient is supplied a handout on cryosurgery and is instructed to call if lesions do not completely resolve.               Follow up in about 3 months (around 11/16/2019).

## 2019-08-23 ENCOUNTER — TELEPHONE (OUTPATIENT)
Dept: DERMATOLOGY | Facility: CLINIC | Age: 84
End: 2019-08-23

## 2019-08-23 NOTE — TELEPHONE ENCOUNTER
----- Message from Alyssa Young sent at 8/23/2019  9:37 AM CDT -----  Contact: Daughter  Daughter is calling because she believes the pt was to be seen today, but  does not see a pending appt for today.  She is requesting a returned call.    She can be reached at [phone].    Thank you.

## 2019-08-23 NOTE — TELEPHONE ENCOUNTER
----- Message from Vanessa Sutton sent at 8/23/2019 11:08 AM CDT -----  Contact: pts jacob Moreno at [phone]  Type:  Test Results    Who Called: Pts jacob Moreno  Name of Test (Lab/Mammo/Etc): biopsies  Date of Test: August 16  Ordering Provider: Shanon  Where the test was performed: [facility]  Would the patient rather a call back or a response via [facility]? Call jacob moreno  Best Call Back Number: [phone]  Additional Information:  Daughter th ough her father was to be seen today but I dont see him on the schedule.  But they have not received any  Results yet.  Please call today

## 2019-08-23 NOTE — TELEPHONE ENCOUNTER
Spoke to pt's daughter, informed her that all bx's are still in process, once reviewed by SELMA/MD pt will be contact by Doctor/Nurse for the next step.Daughter verbalized understanding.

## 2019-09-12 ENCOUNTER — INITIAL CONSULT (OUTPATIENT)
Dept: DERMATOLOGY | Facility: CLINIC | Age: 84
End: 2019-09-12
Payer: MEDICARE

## 2019-09-12 ENCOUNTER — TELEPHONE (OUTPATIENT)
Dept: DERMATOLOGY | Facility: CLINIC | Age: 84
End: 2019-09-12

## 2019-09-12 VITALS — SYSTOLIC BLOOD PRESSURE: 123 MMHG | HEART RATE: 58 BPM | DIASTOLIC BLOOD PRESSURE: 64 MMHG

## 2019-09-12 DIAGNOSIS — C44.311 BASAL CELL CARCINOMA OF LEFT SIDE OF NOSE: ICD-10-CM

## 2019-09-12 DIAGNOSIS — C44.319 BASAL CELL CARCINOMA OF RIGHT FOREHEAD: ICD-10-CM

## 2019-09-12 DIAGNOSIS — D48.5 ATYPICAL FIBROXANTHOMA OF SKIN: Primary | ICD-10-CM

## 2019-09-12 DIAGNOSIS — C44.319 BASAL CELL CARCINOMA OF RIGHT CHEEK: ICD-10-CM

## 2019-09-12 PROCEDURE — 99214 OFFICE O/P EST MOD 30 MIN: CPT | Mod: HCNC,S$GLB,, | Performed by: DERMATOLOGY

## 2019-09-12 PROCEDURE — 1101F PT FALLS ASSESS-DOCD LE1/YR: CPT | Mod: HCNC,CPTII,S$GLB, | Performed by: DERMATOLOGY

## 2019-09-12 PROCEDURE — 99999 PR PBB SHADOW E&M-EST. PATIENT-LVL III: CPT | Mod: PBBFAC,HCNC,, | Performed by: DERMATOLOGY

## 2019-09-12 PROCEDURE — 99999 PR PBB SHADOW E&M-EST. PATIENT-LVL III: ICD-10-PCS | Mod: PBBFAC,HCNC,, | Performed by: DERMATOLOGY

## 2019-09-12 PROCEDURE — 1101F PR PT FALLS ASSESS DOC 0-1 FALLS W/OUT INJ PAST YR: ICD-10-PCS | Mod: HCNC,CPTII,S$GLB, | Performed by: DERMATOLOGY

## 2019-09-12 PROCEDURE — 99214 PR OFFICE/OUTPT VISIT, EST, LEVL IV, 30-39 MIN: ICD-10-PCS | Mod: HCNC,S$GLB,, | Performed by: DERMATOLOGY

## 2019-09-12 NOTE — PROGRESS NOTES
REFERRING MD:  Sarah Claudio M.D.    CHIEF COMPLAINT:  New patient being consulted for Mohs' surgery evaluation.    HISTORY OF PRESENT ILLNESS:  86 y.o. male presents with a 6-7 month(s) history of growth on the R scalp and a 1+ year(s) history of growths on the R lateral forehead, R lateral to eye, R chin, and L nose.    Negative for scabbing.  Negative for crusting.  Negative for bleeding.  Negative for itching.    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   Biopsies of R lateral forehead, R lateral to eye, R chin, and L nose consistent with basal cell carcinoma    No prior treatment to any of these sites.    Pacemaker: No  Defibrillator: No  Artificial joints: No  Artificial heart valves: No    PAST MEDICAL HISTORY:  Past Medical History:   Diagnosis Date    Dyslipidemia     Elevated PSA     Hypertension     Hypothyroid     Osteoarthritis of both knees        PAST SURGICAL HISTORY:  Past Surgical History:   Procedure Laterality Date    CATARACT EXTRACTION      PC IOL OU        SOCIAL HISTORY:  Dependencies:  never smoked    PERTINENT MEDICATIONS:  See medications list.    ALLERGIES:  Patient has no known allergies.    ROS:  Skin: See HPI  Constitutional: No fatigue, fever, malaise, weight loss, or night sweats.  Cardiovascular: No chest pain, palpitations, or edema.  Respiratory: No coughing, wheezing, SOB, or sputum production.    Physical Exam   HENT:   Head:       Nose:                         General: Mood and affect normal. Alert and orient X3. Normal appearance.  Scalp: R posterior scalp with a 15 x 15 mm pink crusted nodule located 12.5 cm superiorly from the right superior ear attachment.   Eyelids: R lateral periorbital with an 8 x 8 mm pink pearly papule located 2 cm laterally from the right lateral canthus.   Head/Face: R superolateral forehead with a 6 x 6 mm pink bx site located 5 cm anteriorly from the right superior ear attachment and 3.5 cm superiorly.   L lateral  sidewall with a 4 x 4 mm pink pearly papule located 2.8 cm inferiorly from the left medial canthus and 2.5 cm superiorly from the left alar base.  R chin with a 6 x 8 mm pink bx site located 1.5 cm laterally from the right oral commissure and 2 cm inferiorly.    IMPRESSION:  Biopsy proven atypical fibroxanthoma- R scalp, and basal cell carcinoma- R lateral forehead, R lateral to eye, R chin, and L nose, path# [id number].    PLAN:  The diagnosis and the pathology report were discussed in detail with the patient. Treatment options were reviewed, including Mohs Micrographic Surgery, radiation, topical therapy, and standard excision.  After careful review of patient's history and physical exam, and after discussion of treatment options, the decision was made to perform Mohs micrographic surgery.    Scheduled patient for Mohs Micrographic Surgery.- for the R scalp, and then another day for both the R lateral forehead and R lateral to eye, and then on another day for the R chin and L nose. Risks, benefits, and alternatives of Mohs' surgery discussed with the patient. Discussed repair options including complex closure, skin flap, skin graft and second intention healing with the patient. Pre-operative instructions provided to the patient.     Spent 30 minutes in coordination of care and/or consultation with patient discussing diagnosis, treatment options, risks and benefits of each. All questions answered.

## 2019-09-12 NOTE — TELEPHONE ENCOUNTER
----- Message from Terra Reynolds sent at 9/12/2019  2:10 PM CDT -----  Contact: Tiffanie / Daughter/  [phone]     Pt. Has an appt. W/ you today.    Has some questions.    Asking you to please call her .   Says pt. Told her that  Your office is at a different location and not on the 11th floor.    Pls call asap.

## 2019-09-23 ENCOUNTER — PES CALL (OUTPATIENT)
Dept: ADMINISTRATIVE | Facility: CLINIC | Age: 84
End: 2019-09-23

## 2019-11-07 ENCOUNTER — PATIENT OUTREACH (OUTPATIENT)
Dept: ADMINISTRATIVE | Facility: OTHER | Age: 84
End: 2019-11-07

## 2019-12-13 RX ORDER — FINASTERIDE 5 MG/1
TABLET, FILM COATED ORAL
Qty: 90 TABLET | Refills: 3 | Status: SHIPPED | OUTPATIENT
Start: 2019-12-13 | End: 2020-12-16

## 2019-12-13 RX ORDER — LEVOTHYROXINE SODIUM 50 UG/1
TABLET ORAL
Qty: 90 TABLET | Refills: 3 | Status: SHIPPED | OUTPATIENT
Start: 2019-12-13 | End: 2020-12-16

## 2020-04-11 NOTE — TELEPHONE ENCOUNTER
----- Message from Charshama Guy sent at 4/11/2020 10:58 AM CDT -----  Contact: Self   Requesting an RX refill or new RX.  Is this a refill or new RX:   RX name and strength:  amLODIPine (NORVASC) 5 MG tablet   Directions (copy/paste from chart):  Take 1 tablet (5 mg total) by mouth once daily. - Oral  Is this a 30 day or 90 day RX:    Local pharmacy or mail order pharmacy:    Pharmacy name and phone # (copy/paste from chart):   [id number] - [place]  [address] [place] [address] [phone] (Phone)  [phone] (Fax)      Comments:

## 2020-04-13 RX ORDER — AMLODIPINE BESYLATE 5 MG/1
5 TABLET ORAL DAILY
Qty: 30 TABLET | Refills: 11 | Status: SHIPPED | OUTPATIENT
Start: 2020-04-13 | End: 2021-01-01 | Stop reason: SDUPTHER

## 2020-06-11 ENCOUNTER — TELEPHONE (OUTPATIENT)
Dept: INTERNAL MEDICINE | Facility: CLINIC | Age: 85
End: 2020-06-11

## 2020-06-11 NOTE — TELEPHONE ENCOUNTER
----- Message from Aileen Beard sent at 6/11/2020  1:34 PM CDT -----  Contact: Nakita/spouse/[phone]  Nakita called in regards needing to talk with PCP about patient last visit, she is concern because he is acting erratically. Please call and advise. Thank you

## 2020-06-12 NOTE — TELEPHONE ENCOUNTER
Spoke with pt's wife due to constant crying and depression.  Even stating that he was seen in the office a couple of weeks ago and was freezing, but advised family that he has not been seen since July 2019.    Then he has been very angry and combative. He has been having unprovoked arguments with the neighbors and has had the authorities called on him to calm the situations.    States that the pt advised that his medications have been changed but the family now understand that this story was untrue since he has not been seen by Dr. Padilla since last July 2019.    Please advise.    Thanks.

## 2020-06-12 NOTE — TELEPHONE ENCOUNTER
Spoke with Mrs. Mace again to advise.    Verbalized understanding but declined to schedule with another MD but requested that pt is scheduled for next available and placed on the waiting list for sooner appt.    Done.

## 2020-06-12 NOTE — TELEPHONE ENCOUNTER
Sounds like he has dementia with agitation.  He needs to be evaluated since we have not seen him in a year.if very agitated then can be brought to ED

## 2020-07-21 ENCOUNTER — PES CALL (OUTPATIENT)
Dept: ADMINISTRATIVE | Facility: CLINIC | Age: 85
End: 2020-07-21

## 2020-11-24 ENCOUNTER — TELEPHONE (OUTPATIENT)
Dept: DERMATOLOGY | Facility: CLINIC | Age: 85
End: 2020-11-24

## 2020-11-24 NOTE — TELEPHONE ENCOUNTER
Spoke to pt's daughter Tiffanie, explained its been over a year and pt will need to be reevaluate his skin.will keep same appt in December.

## 2020-12-17 ENCOUNTER — PATIENT OUTREACH (OUTPATIENT)
Dept: ADMINISTRATIVE | Facility: OTHER | Age: 85
End: 2020-12-17

## 2021-01-01 ENCOUNTER — PROCEDURE VISIT (OUTPATIENT)
Dept: DERMATOLOGY | Facility: CLINIC | Age: 86
End: 2021-01-01
Payer: MEDICARE

## 2021-01-01 ENCOUNTER — PATIENT OUTREACH (OUTPATIENT)
Dept: ADMINISTRATIVE | Facility: OTHER | Age: 86
End: 2021-01-01

## 2021-01-01 ENCOUNTER — OFFICE VISIT (OUTPATIENT)
Dept: DERMATOLOGY | Facility: CLINIC | Age: 86
End: 2021-01-01
Payer: MEDICARE

## 2021-01-01 ENCOUNTER — TELEPHONE (OUTPATIENT)
Dept: DERMATOLOGY | Facility: CLINIC | Age: 86
End: 2021-01-01

## 2021-01-01 ENCOUNTER — PES CALL (OUTPATIENT)
Dept: ADMINISTRATIVE | Facility: CLINIC | Age: 86
End: 2021-01-01

## 2021-01-01 VITALS
DIASTOLIC BLOOD PRESSURE: 76 MMHG | SYSTOLIC BLOOD PRESSURE: 184 MMHG | HEART RATE: 64 BPM | BODY MASS INDEX: 27.83 KG/M2 | WEIGHT: 163 LBS | HEIGHT: 64 IN

## 2021-01-01 DIAGNOSIS — C44.319 BASAL CELL CARCINOMA OF RIGHT FOREHEAD: ICD-10-CM

## 2021-01-01 DIAGNOSIS — D48.5 ATYPICAL FIBROXANTHOMA OF SKIN OF SCALP: Primary | ICD-10-CM

## 2021-01-01 DIAGNOSIS — D48.5 ATYPICAL FIBROXANTHOMA OF SKIN: Primary | ICD-10-CM

## 2021-01-01 PROCEDURE — 99499 UNLISTED E&M SERVICE: CPT | Mod: S$GLB,,, | Performed by: DERMATOLOGY

## 2021-01-01 PROCEDURE — 3288F PR FALLS RISK ASSESSMENT DOCUMENTED: ICD-10-PCS | Mod: CPTII,S$GLB,, | Performed by: DERMATOLOGY

## 2021-01-01 PROCEDURE — 99999 PR PBB SHADOW E&M-EST. PATIENT-LVL I: ICD-10-PCS | Mod: PBBFAC,,, | Performed by: DERMATOLOGY

## 2021-01-01 PROCEDURE — 1101F PR PT FALLS ASSESS DOC 0-1 FALLS W/OUT INJ PAST YR: ICD-10-PCS | Mod: CPTII,S$GLB,, | Performed by: DERMATOLOGY

## 2021-01-01 PROCEDURE — 1126F AMNT PAIN NOTED NONE PRSNT: CPT | Mod: S$GLB,,, | Performed by: DERMATOLOGY

## 2021-01-01 PROCEDURE — 17312 MOHS ADDL STAGE: CPT | Mod: S$GLB,,, | Performed by: DERMATOLOGY

## 2021-01-01 PROCEDURE — 17311: ICD-10-PCS | Mod: S$GLB,,, | Performed by: DERMATOLOGY

## 2021-01-01 PROCEDURE — 1159F PR MEDICATION LIST DOCUMENTED IN MEDICAL RECORD: ICD-10-PCS | Mod: S$GLB,,, | Performed by: DERMATOLOGY

## 2021-01-01 PROCEDURE — 99499 NO LOS: ICD-10-PCS | Mod: S$GLB,,, | Performed by: DERMATOLOGY

## 2021-01-01 PROCEDURE — 99212 OFFICE O/P EST SF 10 MIN: CPT | Mod: S$GLB,,, | Performed by: DERMATOLOGY

## 2021-01-01 PROCEDURE — 99999 PR PBB SHADOW E&M-EST. PATIENT-LVL I: CPT | Mod: PBBFAC,,, | Performed by: DERMATOLOGY

## 2021-01-01 PROCEDURE — 1126F PR PAIN SEVERITY QUANTIFIED, NO PAIN PRESENT: ICD-10-PCS | Mod: S$GLB,,, | Performed by: DERMATOLOGY

## 2021-01-01 PROCEDURE — 99212 PR OFFICE/OUTPT VISIT, EST, LEVL II, 10-19 MIN: ICD-10-PCS | Mod: S$GLB,,, | Performed by: DERMATOLOGY

## 2021-01-01 PROCEDURE — 99213 PR OFFICE/OUTPT VISIT, EST, LEVL III, 20-29 MIN: ICD-10-PCS | Mod: S$GLB,,, | Performed by: DERMATOLOGY

## 2021-01-01 PROCEDURE — 1101F PT FALLS ASSESS-DOCD LE1/YR: CPT | Mod: CPTII,S$GLB,, | Performed by: DERMATOLOGY

## 2021-01-01 PROCEDURE — 1159F MED LIST DOCD IN RCRD: CPT | Mod: S$GLB,,, | Performed by: DERMATOLOGY

## 2021-01-01 PROCEDURE — 17315: ICD-10-PCS | Mod: S$GLB,,, | Performed by: DERMATOLOGY

## 2021-01-01 PROCEDURE — 17312: ICD-10-PCS | Mod: S$GLB,,, | Performed by: DERMATOLOGY

## 2021-01-01 PROCEDURE — 99999 PR PBB SHADOW E&M-EST. PATIENT-LVL II: ICD-10-PCS | Mod: PBBFAC,,, | Performed by: DERMATOLOGY

## 2021-01-01 PROCEDURE — 3288F FALL RISK ASSESSMENT DOCD: CPT | Mod: CPTII,S$GLB,, | Performed by: DERMATOLOGY

## 2021-01-01 PROCEDURE — 17315 MOHS SURG ADDL BLOCK: CPT | Mod: S$GLB,,, | Performed by: DERMATOLOGY

## 2021-01-01 PROCEDURE — 17311 MOHS 1 STAGE H/N/HF/G: CPT | Mod: S$GLB,,, | Performed by: DERMATOLOGY

## 2021-01-01 PROCEDURE — 99213 OFFICE O/P EST LOW 20 MIN: CPT | Mod: S$GLB,,, | Performed by: DERMATOLOGY

## 2021-01-01 PROCEDURE — 99999 PR PBB SHADOW E&M-EST. PATIENT-LVL II: CPT | Mod: PBBFAC,,, | Performed by: DERMATOLOGY

## 2021-01-01 RX ORDER — AMLODIPINE BESYLATE 5 MG/1
5 TABLET ORAL DAILY
Qty: 30 TABLET | Refills: 11 | Status: SHIPPED | OUTPATIENT
Start: 2021-01-01 | End: 2021-01-01 | Stop reason: SDUPTHER

## 2021-01-01 RX ORDER — LEVOTHYROXINE SODIUM 50 UG/1
50 TABLET ORAL DAILY
Qty: 90 TABLET | Refills: 3 | Status: ON HOLD | OUTPATIENT
Start: 2021-01-01 | End: 2022-01-01 | Stop reason: HOSPADM

## 2021-01-01 RX ORDER — FINASTERIDE 5 MG/1
5 TABLET, FILM COATED ORAL DAILY
Qty: 90 TABLET | Refills: 3 | Status: SHIPPED | OUTPATIENT
Start: 2021-01-01 | End: 2021-01-01 | Stop reason: SDUPTHER

## 2021-01-01 RX ORDER — AMLODIPINE BESYLATE 5 MG/1
5 TABLET ORAL DAILY
Qty: 90 TABLET | Refills: 3 | Status: ON HOLD | OUTPATIENT
Start: 2021-01-01 | End: 2022-01-01 | Stop reason: HOSPADM

## 2021-01-01 RX ORDER — LEVOTHYROXINE SODIUM 50 UG/1
50 TABLET ORAL DAILY
Qty: 90 TABLET | Refills: 3 | Status: SHIPPED | OUTPATIENT
Start: 2021-01-01 | End: 2021-01-01 | Stop reason: SDUPTHER

## 2021-01-01 RX ORDER — AMLODIPINE BESYLATE 5 MG/1
5 TABLET ORAL DAILY
Qty: 90 TABLET | Refills: 3 | Status: SHIPPED | OUTPATIENT
Start: 2021-01-01 | End: 2021-01-01 | Stop reason: SDUPTHER

## 2021-01-01 RX ORDER — FINASTERIDE 5 MG/1
5 TABLET, FILM COATED ORAL DAILY
Qty: 90 TABLET | Refills: 3 | Status: ON HOLD | OUTPATIENT
Start: 2021-01-01 | End: 2022-01-01 | Stop reason: HOSPADM

## 2021-01-22 ENCOUNTER — TELEPHONE (OUTPATIENT)
Dept: INTERNAL MEDICINE | Facility: CLINIC | Age: 86
End: 2021-01-22

## 2021-01-22 DIAGNOSIS — Z85.828 HX OF SKIN CANCER, BASAL CELL: Primary | ICD-10-CM

## 2021-01-26 ENCOUNTER — TELEPHONE (OUTPATIENT)
Dept: INTERNAL MEDICINE | Facility: CLINIC | Age: 86
End: 2021-01-26

## 2022-01-01 ENCOUNTER — TELEPHONE (OUTPATIENT)
Dept: INTERNAL MEDICINE | Facility: CLINIC | Age: 87
End: 2022-01-01
Payer: MEDICARE

## 2022-01-01 ENCOUNTER — HOSPITAL ENCOUNTER (INPATIENT)
Facility: HOSPITAL | Age: 87
LOS: 6 days | DRG: 177 | End: 2022-02-09
Attending: EMERGENCY MEDICINE | Admitting: STUDENT IN AN ORGANIZED HEALTH CARE EDUCATION/TRAINING PROGRAM
Payer: MEDICARE

## 2022-01-01 VITALS
OXYGEN SATURATION: 76 % | HEART RATE: 89 BPM | SYSTOLIC BLOOD PRESSURE: 130 MMHG | HEIGHT: 64 IN | DIASTOLIC BLOOD PRESSURE: 63 MMHG | WEIGHT: 154.13 LBS | BODY MASS INDEX: 26.31 KG/M2 | RESPIRATION RATE: 30 BRPM | TEMPERATURE: 98 F

## 2022-01-01 DIAGNOSIS — A41.9 SEPSIS, DUE TO UNSPECIFIED ORGANISM, UNSPECIFIED WHETHER ACUTE ORGAN DYSFUNCTION PRESENT: ICD-10-CM

## 2022-01-01 DIAGNOSIS — J18.9 MULTIFOCAL PNEUMONIA: ICD-10-CM

## 2022-01-01 DIAGNOSIS — Z71.89 GOALS OF CARE, COUNSELING/DISCUSSION: ICD-10-CM

## 2022-01-01 DIAGNOSIS — Z71.89 ADVANCED CARE PLANNING/COUNSELING DISCUSSION: ICD-10-CM

## 2022-01-01 DIAGNOSIS — J12.82 PNEUMONIA DUE TO COVID-19 VIRUS: ICD-10-CM

## 2022-01-01 DIAGNOSIS — U07.1 PNEUMONIA DUE TO COVID-19 VIRUS: ICD-10-CM

## 2022-01-01 DIAGNOSIS — R41.82 AMS (ALTERED MENTAL STATUS): ICD-10-CM

## 2022-01-01 DIAGNOSIS — I63.9 STROKE: ICD-10-CM

## 2022-01-01 DIAGNOSIS — R06.02 SOB (SHORTNESS OF BREATH): ICD-10-CM

## 2022-01-01 DIAGNOSIS — Z51.5 PALLIATIVE CARE ENCOUNTER: ICD-10-CM

## 2022-01-01 DIAGNOSIS — R07.9 CHEST PAIN: ICD-10-CM

## 2022-01-01 DIAGNOSIS — R06.00 DYSPNEA, UNSPECIFIED TYPE: ICD-10-CM

## 2022-01-01 DIAGNOSIS — U07.1 COVID-19 VIRUS INFECTION: Primary | ICD-10-CM

## 2022-01-01 DIAGNOSIS — J96.01 ACUTE HYPOXEMIC RESPIRATORY FAILURE: ICD-10-CM

## 2022-01-01 LAB
ALBUMIN SERPL BCP-MCNC: 1.9 G/DL (ref 3.5–5.2)
ALBUMIN SERPL BCP-MCNC: 1.9 G/DL (ref 3.5–5.2)
ALBUMIN SERPL BCP-MCNC: 2 G/DL (ref 3.5–5.2)
ALBUMIN SERPL BCP-MCNC: 2.2 G/DL (ref 3.5–5.2)
ALBUMIN SERPL BCP-MCNC: 2.4 G/DL (ref 3.5–5.2)
ALBUMIN SERPL BCP-MCNC: 2.6 G/DL (ref 3.5–5.2)
ALLENS TEST: ABNORMAL
ALLENS TEST: ABNORMAL
ALP SERPL-CCNC: 107 U/L (ref 55–135)
ALP SERPL-CCNC: 150 U/L (ref 55–135)
ALP SERPL-CCNC: 90 U/L (ref 55–135)
ALP SERPL-CCNC: 91 U/L (ref 55–135)
ALP SERPL-CCNC: 92 U/L (ref 55–135)
ALP SERPL-CCNC: 99 U/L (ref 55–135)
ALT SERPL W/O P-5'-P-CCNC: 21 U/L (ref 10–44)
ALT SERPL W/O P-5'-P-CCNC: 24 U/L (ref 10–44)
ALT SERPL W/O P-5'-P-CCNC: 25 U/L (ref 10–44)
ALT SERPL W/O P-5'-P-CCNC: 26 U/L (ref 10–44)
ALT SERPL W/O P-5'-P-CCNC: 28 U/L (ref 10–44)
ALT SERPL W/O P-5'-P-CCNC: 29 U/L (ref 10–44)
AMPHET+METHAMPHET UR QL: NEGATIVE
ANION GAP SERPL CALC-SCNC: 10 MMOL/L (ref 8–16)
ANION GAP SERPL CALC-SCNC: 10 MMOL/L (ref 8–16)
ANION GAP SERPL CALC-SCNC: 11 MMOL/L (ref 8–16)
ANION GAP SERPL CALC-SCNC: 13 MMOL/L (ref 8–16)
ANION GAP SERPL CALC-SCNC: 14 MMOL/L (ref 8–16)
ANION GAP SERPL CALC-SCNC: 15 MMOL/L (ref 8–16)
ANION GAP SERPL CALC-SCNC: 16 MMOL/L (ref 8–16)
ANION GAP SERPL CALC-SCNC: 8 MMOL/L (ref 8–16)
APTT BLDCRRT: 25.4 SEC (ref 21–32)
APTT BLDCRRT: 30.6 SEC (ref 21–32)
APTT BLDCRRT: 33.5 SEC (ref 21–32)
APTT BLDCRRT: 35.5 SEC (ref 21–32)
APTT BLDCRRT: 40.7 SEC (ref 21–32)
APTT BLDCRRT: 44.2 SEC (ref 21–32)
APTT BLDCRRT: 53.3 SEC (ref 21–32)
APTT BLDCRRT: 64.1 SEC (ref 21–32)
APTT BLDCRRT: 79.6 SEC (ref 21–32)
ASCENDING AORTA: 3.78 CM
ASCENDING AORTA: 4.1 CM
AST SERPL-CCNC: 45 U/L (ref 10–40)
AST SERPL-CCNC: 57 U/L (ref 10–40)
AST SERPL-CCNC: 59 U/L (ref 10–40)
AST SERPL-CCNC: 69 U/L (ref 10–40)
AST SERPL-CCNC: 69 U/L (ref 10–40)
AST SERPL-CCNC: 81 U/L (ref 10–40)
AV INDEX (PROSTH): 0.38
AV INDEX (PROSTH): 0.44
AV MEAN GRADIENT: 5 MMHG
AV MEAN GRADIENT: 6 MMHG
AV PEAK GRADIENT: 11 MMHG
AV PEAK GRADIENT: 9 MMHG
AV VALVE AREA: 1.36 CM2
AV VALVE AREA: 1.42 CM2
AV VELOCITY RATIO: 0.36
AV VELOCITY RATIO: 0.4
BACTERIA #/AREA URNS AUTO: ABNORMAL /HPF
BACTERIA BLD CULT: NORMAL
BACTERIA BLD CULT: NORMAL
BACTERIA CSF CULT: NO GROWTH
BARBITURATES UR QL SCN>200 NG/ML: NEGATIVE
BASOPHILS # BLD AUTO: 0.01 K/UL (ref 0–0.2)
BASOPHILS NFR BLD: 0.1 % (ref 0–1.9)
BENZODIAZ UR QL SCN>200 NG/ML: NEGATIVE
BILIRUB SERPL-MCNC: 1.5 MG/DL (ref 0.1–1)
BILIRUB SERPL-MCNC: 1.7 MG/DL (ref 0.1–1)
BILIRUB SERPL-MCNC: 1.8 MG/DL (ref 0.1–1)
BILIRUB SERPL-MCNC: 1.8 MG/DL (ref 0.1–1)
BILIRUB SERPL-MCNC: 1.9 MG/DL (ref 0.1–1)
BILIRUB SERPL-MCNC: 1.9 MG/DL (ref 0.1–1)
BILIRUB UR QL STRIP: NEGATIVE
BNP SERPL-MCNC: 693 PG/ML (ref 0–99)
BSA FOR ECHO PROCEDURE: 1.78 M2
BSA FOR ECHO PROCEDURE: 1.78 M2
BUN SERPL-MCNC: 39 MG/DL (ref 8–23)
BUN SERPL-MCNC: 52 MG/DL (ref 8–23)
BUN SERPL-MCNC: 60 MG/DL (ref 8–23)
BUN SERPL-MCNC: 69 MG/DL (ref 8–23)
BUN SERPL-MCNC: 83 MG/DL (ref 8–23)
BUN SERPL-MCNC: 88 MG/DL (ref 8–23)
BUN SERPL-MCNC: 96 MG/DL (ref 8–23)
BUN SERPL-MCNC: 98 MG/DL (ref 8–23)
BZE UR QL SCN: NEGATIVE
CALCIUM SERPL-MCNC: 6.8 MG/DL (ref 8.7–10.5)
CALCIUM SERPL-MCNC: 7.6 MG/DL (ref 8.7–10.5)
CALCIUM SERPL-MCNC: 7.9 MG/DL (ref 8.7–10.5)
CALCIUM SERPL-MCNC: 8 MG/DL (ref 8.7–10.5)
CALCIUM SERPL-MCNC: 8.2 MG/DL (ref 8.7–10.5)
CALCIUM SERPL-MCNC: 8.6 MG/DL (ref 8.7–10.5)
CALCIUM SERPL-MCNC: 8.6 MG/DL (ref 8.7–10.5)
CALCIUM SERPL-MCNC: 9.2 MG/DL (ref 8.7–10.5)
CANNABINOIDS UR QL SCN: NEGATIVE
CHLORIDE SERPL-SCNC: 110 MMOL/L (ref 95–110)
CHLORIDE SERPL-SCNC: 113 MMOL/L (ref 95–110)
CHLORIDE SERPL-SCNC: 114 MMOL/L (ref 95–110)
CHLORIDE SERPL-SCNC: 115 MMOL/L (ref 95–110)
CHLORIDE SERPL-SCNC: 119 MMOL/L (ref 95–110)
CHLORIDE SERPL-SCNC: 121 MMOL/L (ref 95–110)
CHLORIDE SERPL-SCNC: 122 MMOL/L (ref 95–110)
CHLORIDE SERPL-SCNC: 123 MMOL/L (ref 95–110)
CHOLEST SERPL-MCNC: 97 MG/DL (ref 120–199)
CHOLEST/HDLC SERPL: 3.9 {RATIO} (ref 2–5)
CK SERPL-CCNC: 73 U/L (ref 20–200)
CLARITY CSF: ABNORMAL
CLARITY UR REFRACT.AUTO: CLEAR
CO2 SERPL-SCNC: 17 MMOL/L (ref 23–29)
CO2 SERPL-SCNC: 17 MMOL/L (ref 23–29)
CO2 SERPL-SCNC: 19 MMOL/L (ref 23–29)
CO2 SERPL-SCNC: 20 MMOL/L (ref 23–29)
CO2 SERPL-SCNC: 21 MMOL/L (ref 23–29)
CO2 SERPL-SCNC: 21 MMOL/L (ref 23–29)
CO2 SERPL-SCNC: 23 MMOL/L (ref 23–29)
CO2 SERPL-SCNC: 23 MMOL/L (ref 23–29)
COLOR CSF: ABNORMAL
COLOR UR AUTO: ABNORMAL
CREAT SERPL-MCNC: 1 MG/DL (ref 0.5–1.4)
CREAT SERPL-MCNC: 1.2 MG/DL (ref 0.5–1.4)
CREAT SERPL-MCNC: 1.4 MG/DL (ref 0.5–1.4)
CREAT SERPL-MCNC: 1.6 MG/DL (ref 0.5–1.4)
CREAT SERPL-MCNC: 1.7 MG/DL (ref 0.5–1.4)
CREAT SERPL-MCNC: 1.9 MG/DL (ref 0.5–1.4)
CREAT SERPL-MCNC: 1.9 MG/DL (ref 0.5–1.4)
CREAT SERPL-MCNC: 2.1 MG/DL (ref 0.5–1.4)
CREAT UR-MCNC: 127 MG/DL (ref 23–375)
CRP SERPL-MCNC: 139.8 MG/L (ref 0–8.2)
CTP QC/QA: YES
CV ECHO LV RWT: 0.3 CM
CV ECHO LV RWT: 0.35 CM
D DIMER PPP IA.FEU-MCNC: 13.7 MG/L FEU
D DIMER PPP IA.FEU-MCNC: >33 MG/L FEU
D DIMER PPP IA.FEU-MCNC: >33 MG/L FEU
DELSYS: ABNORMAL
DELSYS: ABNORMAL
DIFFERENTIAL METHOD: ABNORMAL
DOP CALC AO PEAK VEL: 1.51 M/S
DOP CALC AO PEAK VEL: 1.65 M/S
DOP CALC AO VTI: 24.33 CM
DOP CALC AO VTI: 25.96 CM
DOP CALC LVOT AREA: 3.2 CM2
DOP CALC LVOT AREA: 3.6 CM2
DOP CALC LVOT DIAMETER: 2.03 CM
DOP CALC LVOT DIAMETER: 2.14 CM
DOP CALC LVOT PEAK VEL: 0.6 M/S
DOP CALC LVOT PEAK VEL: 0.61 M/S
DOP CALC LVOT STROKE VOLUME: 34.45 CM3
DOP CALC LVOT STROKE VOLUME: 35.34 CM3
DOP CALCLVOT PEAK VEL VTI: 10.65 CM
DOP CALCLVOT PEAK VEL VTI: 9.83 CM
E/E' RATIO: 16 M/S
ECHO LV POSTERIOR WALL: 0.81 CM (ref 0.6–1.1)
ECHO LV POSTERIOR WALL: 0.86 CM (ref 0.6–1.1)
EJECTION FRACTION: 20 %
EJECTION FRACTION: 20 %
EOSINOPHIL # BLD AUTO: 0 K/UL (ref 0–0.5)
EOSINOPHIL NFR BLD: 0 % (ref 0–8)
EOSINOPHIL NFR BLD: 0 % (ref 0–8)
EOSINOPHIL NFR BLD: 0.1 % (ref 0–8)
ERYTHROCYTE [DISTWIDTH] IN BLOOD BY AUTOMATED COUNT: 16.2 % (ref 11.5–14.5)
ERYTHROCYTE [DISTWIDTH] IN BLOOD BY AUTOMATED COUNT: 16.6 % (ref 11.5–14.5)
ERYTHROCYTE [DISTWIDTH] IN BLOOD BY AUTOMATED COUNT: 17 % (ref 11.5–14.5)
ERYTHROCYTE [DISTWIDTH] IN BLOOD BY AUTOMATED COUNT: 17.3 % (ref 11.5–14.5)
ERYTHROCYTE [DISTWIDTH] IN BLOOD BY AUTOMATED COUNT: 17.5 % (ref 11.5–14.5)
EST. GFR  (AFRICAN AMERICAN): 31.5 ML/MIN/1.73 M^2
EST. GFR  (AFRICAN AMERICAN): 35.6 ML/MIN/1.73 M^2
EST. GFR  (AFRICAN AMERICAN): 35.6 ML/MIN/1.73 M^2
EST. GFR  (AFRICAN AMERICAN): 40.7 ML/MIN/1.73 M^2
EST. GFR  (AFRICAN AMERICAN): 43.8 ML/MIN/1.73 M^2
EST. GFR  (AFRICAN AMERICAN): 51.5 ML/MIN/1.73 M^2
EST. GFR  (AFRICAN AMERICAN): >60 ML/MIN/1.73 M^2
EST. GFR  (AFRICAN AMERICAN): >60 ML/MIN/1.73 M^2
EST. GFR  (NON AFRICAN AMERICAN): 27.3 ML/MIN/1.73 M^2
EST. GFR  (NON AFRICAN AMERICAN): 30.8 ML/MIN/1.73 M^2
EST. GFR  (NON AFRICAN AMERICAN): 30.8 ML/MIN/1.73 M^2
EST. GFR  (NON AFRICAN AMERICAN): 35.2 ML/MIN/1.73 M^2
EST. GFR  (NON AFRICAN AMERICAN): 37.9 ML/MIN/1.73 M^2
EST. GFR  (NON AFRICAN AMERICAN): 44.5 ML/MIN/1.73 M^2
EST. GFR  (NON AFRICAN AMERICAN): 53.7 ML/MIN/1.73 M^2
EST. GFR  (NON AFRICAN AMERICAN): >60 ML/MIN/1.73 M^2
ESTIMATED AVG GLUCOSE: 126 MG/DL (ref 68–131)
FERRITIN SERPL-MCNC: 877 NG/ML (ref 20–300)
FLOW: 11
FLOW: 6
FRACTIONAL SHORTENING: 10 % (ref 28–44)
FRACTIONAL SHORTENING: 7 % (ref 28–44)
GLUCOSE CSF-MCNC: 68 MG/DL (ref 40–70)
GLUCOSE SERPL-MCNC: 102 MG/DL (ref 70–110)
GLUCOSE SERPL-MCNC: 106 MG/DL (ref 70–110)
GLUCOSE SERPL-MCNC: 111 MG/DL (ref 70–110)
GLUCOSE SERPL-MCNC: 125 MG/DL (ref 70–110)
GLUCOSE SERPL-MCNC: 127 MG/DL (ref 70–110)
GLUCOSE SERPL-MCNC: 137 MG/DL (ref 70–110)
GLUCOSE SERPL-MCNC: 155 MG/DL (ref 70–110)
GLUCOSE SERPL-MCNC: 98 MG/DL (ref 70–110)
GLUCOSE UR QL STRIP: NEGATIVE
GRAM STN SPEC: NORMAL
GRAM STN SPEC: NORMAL
HBA1C MFR BLD: 6 % (ref 4–5.6)
HBV CORE AB SERPL QL IA: NEGATIVE
HBV SURFACE AG SERPL QL IA: NEGATIVE
HCO3 UR-SCNC: 17.2 MMOL/L (ref 24–28)
HCO3 UR-SCNC: 23 MMOL/L (ref 24–28)
HCT VFR BLD AUTO: 38.8 % (ref 40–54)
HCT VFR BLD AUTO: 40.3 % (ref 40–54)
HCT VFR BLD AUTO: 40.9 % (ref 40–54)
HCT VFR BLD AUTO: 41 % (ref 40–54)
HCT VFR BLD AUTO: 42.3 % (ref 40–54)
HCT VFR BLD AUTO: 44.7 % (ref 40–54)
HCT VFR BLD AUTO: 45.5 % (ref 40–54)
HDLC SERPL-MCNC: 25 MG/DL (ref 40–75)
HDLC SERPL: 25.8 % (ref 20–50)
HGB BLD-MCNC: 12.2 G/DL (ref 14–18)
HGB BLD-MCNC: 12.3 G/DL (ref 14–18)
HGB BLD-MCNC: 12.4 G/DL (ref 14–18)
HGB BLD-MCNC: 12.4 G/DL (ref 14–18)
HGB BLD-MCNC: 13.2 G/DL (ref 14–18)
HGB BLD-MCNC: 13.8 G/DL (ref 14–18)
HGB BLD-MCNC: 14 G/DL (ref 14–18)
HGB UR QL STRIP: ABNORMAL
HSV1, PCR, CSF: NEGATIVE
HSV2, PCR, CSF: NEGATIVE
HYALINE CASTS UR QL AUTO: 0 /LPF
IMM GRANULOCYTES # BLD AUTO: 0.05 K/UL (ref 0–0.04)
IMM GRANULOCYTES # BLD AUTO: 0.06 K/UL (ref 0–0.04)
IMM GRANULOCYTES # BLD AUTO: 0.09 K/UL (ref 0–0.04)
IMM GRANULOCYTES NFR BLD AUTO: 0.5 % (ref 0–0.5)
INR PPP: 1.6 (ref 0.8–1.2)
INTERVENTRICULAR SEPTUM: 0.8 CM (ref 0.6–1.1)
INTERVENTRICULAR SEPTUM: 1.04 CM (ref 0.6–1.1)
KETONES UR QL STRIP: NEGATIVE
LA MAJOR: 5.64 CM
LA MAJOR: 6.3 CM
LA MINOR: 6.12 CM
LA MINOR: 6.53 CM
LA WIDTH: 4.53 CM
LA WIDTH: 5.13 CM
LACTATE DEHYDROGENASE FLUID: 55 U/L
LACTATE SERPL-SCNC: 1.8 MMOL/L (ref 0.5–2.2)
LACTATE SERPL-SCNC: 2.2 MMOL/L (ref 0.5–2.2)
LACTATE SERPL-SCNC: 3.8 MMOL/L (ref 0.5–2.2)
LACTATE SERPL-SCNC: 3.9 MMOL/L (ref 0.5–2.2)
LACTATE SERPL-SCNC: 5.3 MMOL/L (ref 0.5–2.2)
LDH SERPL L TO P-CCNC: 543 U/L (ref 110–260)
LDLC SERPL CALC-MCNC: 58.6 MG/DL (ref 63–159)
LEFT ATRIUM SIZE: 4.01 CM
LEFT ATRIUM SIZE: 4.07 CM
LEFT ATRIUM VOLUME INDEX MOD: 45.5 ML/M2
LEFT ATRIUM VOLUME INDEX MOD: 64.1 ML/M2
LEFT ATRIUM VOLUME INDEX: 52.6 ML/M2
LEFT ATRIUM VOLUME INDEX: 64.1 ML/M2
LEFT ATRIUM VOLUME MOD: 112.09 CM3
LEFT ATRIUM VOLUME MOD: 79.69 CM3
LEFT ATRIUM VOLUME: 112.13 CM3
LEFT ATRIUM VOLUME: 92 CM3
LEFT INTERNAL DIMENSION IN SYSTOLE: 4.48 CM (ref 2.1–4)
LEFT INTERNAL DIMENSION IN SYSTOLE: 5.09 CM (ref 2.1–4)
LEFT VENTRICLE DIASTOLIC VOLUME INDEX: 66.45 ML/M2
LEFT VENTRICLE DIASTOLIC VOLUME INDEX: 82.4 ML/M2
LEFT VENTRICLE DIASTOLIC VOLUME: 116.28 ML
LEFT VENTRICLE DIASTOLIC VOLUME: 144.2 ML
LEFT VENTRICLE MASS INDEX: 108 G/M2
LEFT VENTRICLE MASS INDEX: 80 G/M2
LEFT VENTRICLE SYSTOLIC VOLUME INDEX: 52.2 ML/M2
LEFT VENTRICLE SYSTOLIC VOLUME INDEX: 70.3 ML/M2
LEFT VENTRICLE SYSTOLIC VOLUME: 123.07 ML
LEFT VENTRICLE SYSTOLIC VOLUME: 91.3 ML
LEFT VENTRICULAR INTERNAL DIMENSION IN DIASTOLE: 4.96 CM (ref 3.5–6)
LEFT VENTRICULAR INTERNAL DIMENSION IN DIASTOLE: 5.45 CM (ref 3.5–6)
LEFT VENTRICULAR MASS: 140.45 G
LEFT VENTRICULAR MASS: 189.58 G
LEUKOCYTE ESTERASE UR QL STRIP: NEGATIVE
LV LATERAL E/E' RATIO: 11.56 M/S
LV SEPTAL E/E' RATIO: 26 M/S
LYMPHOCYTES # BLD AUTO: 0.3 K/UL (ref 1–4.8)
LYMPHOCYTES # BLD AUTO: 0.4 K/UL (ref 1–4.8)
LYMPHOCYTES # BLD AUTO: 0.5 K/UL (ref 1–4.8)
LYMPHOCYTES NFR BLD: 2.1 % (ref 18–48)
LYMPHOCYTES NFR BLD: 2.7 % (ref 18–48)
LYMPHOCYTES NFR BLD: 3.7 % (ref 18–48)
LYMPHOCYTES NFR CSF MANUAL: 11 % (ref 40–80)
MAGNESIUM SERPL-MCNC: 2.2 MG/DL (ref 1.6–2.6)
MAGNESIUM SERPL-MCNC: 2.4 MG/DL (ref 1.6–2.6)
MAGNESIUM SERPL-MCNC: 2.4 MG/DL (ref 1.6–2.6)
MAGNESIUM SERPL-MCNC: 2.5 MG/DL (ref 1.6–2.6)
MAGNESIUM SERPL-MCNC: 2.7 MG/DL (ref 1.6–2.6)
MAGNESIUM SERPL-MCNC: 2.9 MG/DL (ref 1.6–2.6)
MCH RBC QN AUTO: 24.3 PG (ref 27–31)
MCH RBC QN AUTO: 24.6 PG (ref 27–31)
MCH RBC QN AUTO: 24.7 PG (ref 27–31)
MCH RBC QN AUTO: 24.8 PG (ref 27–31)
MCH RBC QN AUTO: 24.8 PG (ref 27–31)
MCH RBC QN AUTO: 24.9 PG (ref 27–31)
MCH RBC QN AUTO: 25.1 PG (ref 27–31)
MCHC RBC AUTO-ENTMCNC: 30.2 G/DL (ref 32–36)
MCHC RBC AUTO-ENTMCNC: 30.3 G/DL (ref 32–36)
MCHC RBC AUTO-ENTMCNC: 30.3 G/DL (ref 32–36)
MCHC RBC AUTO-ENTMCNC: 30.8 G/DL (ref 32–36)
MCHC RBC AUTO-ENTMCNC: 30.9 G/DL (ref 32–36)
MCHC RBC AUTO-ENTMCNC: 31.2 G/DL (ref 32–36)
MCHC RBC AUTO-ENTMCNC: 31.7 G/DL (ref 32–36)
MCV RBC AUTO: 79 FL (ref 82–98)
MCV RBC AUTO: 80 FL (ref 82–98)
MCV RBC AUTO: 81 FL (ref 82–98)
MCV RBC AUTO: 82 FL (ref 82–98)
MCV RBC AUTO: 82 FL (ref 82–98)
METHADONE UR QL SCN>300 NG/ML: NEGATIVE
MICROSCOPIC COMMENT: ABNORMAL
MODE: ABNORMAL
MODE: ABNORMAL
MONOCYTES # BLD AUTO: 0.3 K/UL (ref 0.3–1)
MONOCYTES # BLD AUTO: 0.5 K/UL (ref 0.3–1)
MONOCYTES # BLD AUTO: 0.7 K/UL (ref 0.3–1)
MONOCYTES NFR BLD: 2.8 % (ref 4–15)
MONOCYTES NFR BLD: 3.8 % (ref 4–15)
MONOCYTES NFR BLD: 4.2 % (ref 4–15)
MONOS+MACROS NFR CSF MANUAL: 23 % (ref 15–45)
MV PEAK E VEL: 1.04 M/S
NEUTROPHILS # BLD AUTO: 10.9 K/UL (ref 1.8–7.7)
NEUTROPHILS # BLD AUTO: 17.1 K/UL (ref 1.8–7.7)
NEUTROPHILS # BLD AUTO: 9.5 K/UL (ref 1.8–7.7)
NEUTROPHILS NFR BLD: 92.8 % (ref 38–73)
NEUTROPHILS NFR BLD: 92.9 % (ref 38–73)
NEUTROPHILS NFR BLD: 93.1 % (ref 38–73)
NEUTROPHILS NFR CSF MANUAL: 66 % (ref 0–6)
NITRITE UR QL STRIP: NEGATIVE
NONHDLC SERPL-MCNC: 72 MG/DL
NRBC BLD-RTO: 0 /100 WBC
OPIATES UR QL SCN: NEGATIVE
PCO2 BLDA: 26.5 MMHG (ref 35–45)
PCO2 BLDA: 37.1 MMHG (ref 35–45)
PCP UR QL SCN>25 NG/ML: NEGATIVE
PH SMN: 7.4 [PH] (ref 7.35–7.45)
PH SMN: 7.42 [PH] (ref 7.35–7.45)
PH UR STRIP: 5 [PH] (ref 5–8)
PHOSPHATE SERPL-MCNC: 4 MG/DL (ref 2.7–4.5)
PHOSPHATE SERPL-MCNC: 4.1 MG/DL (ref 2.7–4.5)
PHOSPHATE SERPL-MCNC: 4.1 MG/DL (ref 2.7–4.5)
PHOSPHATE SERPL-MCNC: 4.3 MG/DL (ref 2.7–4.5)
PHOSPHATE SERPL-MCNC: 4.9 MG/DL (ref 2.7–4.5)
PISA MRMAX VEL: 0.05 M/S
PISA TR MAX VEL: 2.52 M/S
PISA TR MAX VEL: 3.28 M/S
PLATELET # BLD AUTO: 101 K/UL (ref 150–450)
PLATELET # BLD AUTO: 106 K/UL (ref 150–450)
PLATELET # BLD AUTO: 153 K/UL (ref 150–450)
PLATELET # BLD AUTO: 163 K/UL (ref 150–450)
PLATELET # BLD AUTO: 178 K/UL (ref 150–450)
PLATELET # BLD AUTO: 316 K/UL (ref 150–450)
PLATELET # BLD AUTO: 88 K/UL (ref 150–450)
PLATELET BLD QL SMEAR: NORMAL
PMV BLD AUTO: 10.7 FL (ref 9.2–12.9)
PMV BLD AUTO: 11.6 FL (ref 9.2–12.9)
PMV BLD AUTO: 12 FL (ref 9.2–12.9)
PMV BLD AUTO: 12.1 FL (ref 9.2–12.9)
PMV BLD AUTO: ABNORMAL FL (ref 9.2–12.9)
PO2 BLDA: 52 MMHG (ref 80–100)
PO2 BLDA: 60 MMHG (ref 80–100)
POC BE: -2 MMOL/L
POC BE: -7 MMOL/L
POC SATURATED O2: 88 % (ref 95–100)
POC SATURATED O2: 91 % (ref 95–100)
POC TCO2: 18 MMOL/L (ref 23–27)
POC TCO2: 24 MMOL/L (ref 23–27)
POCT GLUCOSE: 129 MG/DL (ref 70–110)
POCT GLUCOSE: 132 MG/DL (ref 70–110)
POCT GLUCOSE: 132 MG/DL (ref 70–110)
POTASSIUM SERPL-SCNC: 3.2 MMOL/L (ref 3.5–5.1)
POTASSIUM SERPL-SCNC: 3.6 MMOL/L (ref 3.5–5.1)
POTASSIUM SERPL-SCNC: 3.7 MMOL/L (ref 3.5–5.1)
POTASSIUM SERPL-SCNC: 3.9 MMOL/L (ref 3.5–5.1)
POTASSIUM SERPL-SCNC: 3.9 MMOL/L (ref 3.5–5.1)
POTASSIUM SERPL-SCNC: 4 MMOL/L (ref 3.5–5.1)
POTASSIUM SERPL-SCNC: 4.1 MMOL/L (ref 3.5–5.1)
POTASSIUM SERPL-SCNC: 4.2 MMOL/L (ref 3.5–5.1)
PROCALCITONIN SERPL IA-MCNC: 0.17 NG/ML
PROT CSF-MCNC: 90 MG/DL (ref 15–40)
PROT SERPL-MCNC: 4.8 G/DL (ref 6–8.4)
PROT SERPL-MCNC: 4.8 G/DL (ref 6–8.4)
PROT SERPL-MCNC: 4.9 G/DL (ref 6–8.4)
PROT SERPL-MCNC: 5 G/DL (ref 6–8.4)
PROT SERPL-MCNC: 5.9 G/DL (ref 6–8.4)
PROT SERPL-MCNC: 6.1 G/DL (ref 6–8.4)
PROT UR QL STRIP: ABNORMAL
PROTHROMBIN TIME: 16.5 SEC (ref 9–12.5)
RA MAJOR: 5.6 CM
RA MAJOR: 5.63 CM
RA PRESSURE: 15 MMHG
RA PRESSURE: 8 MMHG
RA WIDTH: 4.05 CM
RA WIDTH: 4.4 CM
RBC # BLD AUTO: 4.91 M/UL (ref 4.6–6.2)
RBC # BLD AUTO: 4.91 M/UL (ref 4.6–6.2)
RBC # BLD AUTO: 5.02 M/UL (ref 4.6–6.2)
RBC # BLD AUTO: 5.11 M/UL (ref 4.6–6.2)
RBC # BLD AUTO: 5.31 M/UL (ref 4.6–6.2)
RBC # BLD AUTO: 5.6 M/UL (ref 4.6–6.2)
RBC # BLD AUTO: 5.65 M/UL (ref 4.6–6.2)
RBC # CSF: 2000 /CU MM
RBC #/AREA URNS AUTO: 5 /HPF (ref 0–4)
RIGHT VENTRICULAR END-DIASTOLIC DIMENSION: 2.7 CM
RIGHT VENTRICULAR END-DIASTOLIC DIMENSION: 3.27 CM
RV TISSUE DOPPLER FREE WALL SYSTOLIC VELOCITY 1 (APICAL 4 CHAMBER VIEW): 9.21 CM/S
SAMPLE: ABNORMAL
SAMPLE: ABNORMAL
SARS-COV-2 RDRP RESP QL NAA+PROBE: POSITIVE
SINUS: 3.49 CM
SINUS: 3.56 CM
SITE: ABNORMAL
SITE: ABNORMAL
SODIUM SERPL-SCNC: 143 MMOL/L (ref 136–145)
SODIUM SERPL-SCNC: 145 MMOL/L (ref 136–145)
SODIUM SERPL-SCNC: 149 MMOL/L (ref 136–145)
SODIUM SERPL-SCNC: 150 MMOL/L (ref 136–145)
SODIUM SERPL-SCNC: 151 MMOL/L (ref 136–145)
SODIUM SERPL-SCNC: 151 MMOL/L (ref 136–145)
SODIUM SERPL-SCNC: 152 MMOL/L (ref 136–145)
SODIUM SERPL-SCNC: 154 MMOL/L (ref 136–145)
SP GR UR STRIP: 1.03 (ref 1–1.03)
SPECIMEN VOL CSF: 2.5 ML
SQUAMOUS #/AREA URNS AUTO: 0 /HPF
STJ: 2.85 CM
STJ: 2.97 CM
TDI LATERAL: 0.09 M/S
TDI SEPTAL: 0.04 M/S
TDI: 0.07 M/S
TOXICOLOGY INFORMATION: NORMAL
TR MAX PG: 25 MMHG
TR MAX PG: 43 MMHG
TRICUSPID ANNULAR PLANE SYSTOLIC EXCURSION: 1.16 CM
TRICUSPID ANNULAR PLANE SYSTOLIC EXCURSION: 1.49 CM
TRIGL SERPL-MCNC: 67 MG/DL (ref 30–150)
TROPONIN I SERPL DL<=0.01 NG/ML-MCNC: 0.01 NG/ML (ref 0–0.03)
TROPONIN I SERPL DL<=0.01 NG/ML-MCNC: 0.07 NG/ML (ref 0–0.03)
TSH SERPL DL<=0.005 MIU/L-ACNC: 3.06 UIU/ML (ref 0.4–4)
TV REST PULMONARY ARTERY PRESSURE: 40 MMHG
TV REST PULMONARY ARTERY PRESSURE: 51 MMHG
URN SPEC COLLECT METH UR: ABNORMAL
VANCOMYCIN TROUGH SERPL-MCNC: 12.7 UG/ML (ref 10–22)
VDRL CSF QL: NEGATIVE
WBC # BLD AUTO: 10.19 K/UL (ref 3.9–12.7)
WBC # BLD AUTO: 10.61 K/UL (ref 3.9–12.7)
WBC # BLD AUTO: 10.77 K/UL (ref 3.9–12.7)
WBC # BLD AUTO: 11.71 K/UL (ref 3.9–12.7)
WBC # BLD AUTO: 12.9 K/UL (ref 3.9–12.7)
WBC # BLD AUTO: 17.81 K/UL (ref 3.9–12.7)
WBC # BLD AUTO: 18.36 K/UL (ref 3.9–12.7)
WBC # CSF: 5 /CU MM (ref 0–5)
WBC #/AREA URNS AUTO: 2 /HPF (ref 0–5)

## 2022-01-01 PROCEDURE — 93010 EKG 12-LEAD: ICD-10-PCS | Mod: ,,, | Performed by: STUDENT IN AN ORGANIZED HEALTH CARE EDUCATION/TRAINING PROGRAM

## 2022-01-01 PROCEDURE — 63600175 PHARM REV CODE 636 W HCPCS: Performed by: STUDENT IN AN ORGANIZED HEALTH CARE EDUCATION/TRAINING PROGRAM

## 2022-01-01 PROCEDURE — 25000003 PHARM REV CODE 250: Performed by: EMERGENCY MEDICINE

## 2022-01-01 PROCEDURE — 99900035 HC TECH TIME PER 15 MIN (STAT): Mod: HCNC

## 2022-01-01 PROCEDURE — 84100 ASSAY OF PHOSPHORUS: CPT

## 2022-01-01 PROCEDURE — 62270 DX LMBR SPI PNXR: CPT | Mod: GC,,, | Performed by: INTERNAL MEDICINE

## 2022-01-01 PROCEDURE — 25000003 PHARM REV CODE 250

## 2022-01-01 PROCEDURE — 63600175 PHARM REV CODE 636 W HCPCS

## 2022-01-01 PROCEDURE — 80048 BASIC METABOLIC PNL TOTAL CA: CPT | Performed by: STUDENT IN AN ORGANIZED HEALTH CARE EDUCATION/TRAINING PROGRAM

## 2022-01-01 PROCEDURE — 27000207 HC ISOLATION

## 2022-01-01 PROCEDURE — 84100 ASSAY OF PHOSPHORUS: CPT | Mod: HCNC

## 2022-01-01 PROCEDURE — 99233 PR SUBSEQUENT HOSPITAL CARE,LEVL III: ICD-10-PCS | Mod: HCNC,95,CR, | Performed by: CLINICAL NURSE SPECIALIST

## 2022-01-01 PROCEDURE — 25000003 PHARM REV CODE 250: Mod: HCNC

## 2022-01-01 PROCEDURE — 85610 PROTHROMBIN TIME: CPT

## 2022-01-01 PROCEDURE — 80053 COMPREHEN METABOLIC PANEL: CPT

## 2022-01-01 PROCEDURE — 94640 AIRWAY INHALATION TREATMENT: CPT | Mod: HCNC

## 2022-01-01 PROCEDURE — 63600175 PHARM REV CODE 636 W HCPCS: Mod: HCNC

## 2022-01-01 PROCEDURE — 83615 LACTATE (LD) (LDH) ENZYME: CPT

## 2022-01-01 PROCEDURE — 81001 URINALYSIS AUTO W/SCOPE: CPT | Performed by: EMERGENCY MEDICINE

## 2022-01-01 PROCEDURE — 83880 ASSAY OF NATRIURETIC PEPTIDE: CPT | Performed by: EMERGENCY MEDICINE

## 2022-01-01 PROCEDURE — 85379 FIBRIN DEGRADATION QUANT: CPT

## 2022-01-01 PROCEDURE — 99291 PR CRITICAL CARE, E/M 30-74 MINUTES: ICD-10-PCS | Mod: HCNC,GC,, | Performed by: INTERNAL MEDICINE

## 2022-01-01 PROCEDURE — 94761 N-INVAS EAR/PLS OXIMETRY MLT: CPT | Mod: HCNC

## 2022-01-01 PROCEDURE — 99900035 HC TECH TIME PER 15 MIN (STAT)

## 2022-01-01 PROCEDURE — 85730 THROMBOPLASTIN TIME PARTIAL: CPT

## 2022-01-01 PROCEDURE — 27100171 HC OXYGEN HIGH FLOW UP TO 24 HOURS

## 2022-01-01 PROCEDURE — 83735 ASSAY OF MAGNESIUM: CPT

## 2022-01-01 PROCEDURE — 84157 ASSAY OF PROTEIN OTHER: CPT

## 2022-01-01 PROCEDURE — 99291 CRITICAL CARE FIRST HOUR: CPT | Mod: GC,,, | Performed by: INTERNAL MEDICINE

## 2022-01-01 PROCEDURE — 96365 THER/PROPH/DIAG IV INF INIT: CPT | Mod: HCNC

## 2022-01-01 PROCEDURE — 99291 PR CRITICAL CARE, E/M 30-74 MINUTES: ICD-10-PCS | Mod: CS,,, | Performed by: EMERGENCY MEDICINE

## 2022-01-01 PROCEDURE — 99358 PR PROLONGED SERV,NO CONTACT,1ST HR: ICD-10-PCS | Mod: HCNC,95,CR, | Performed by: CLINICAL NURSE SPECIALIST

## 2022-01-01 PROCEDURE — 99233 SBSQ HOSP IP/OBS HIGH 50: CPT | Mod: HCNC,95,CR, | Performed by: CLINICAL NURSE SPECIALIST

## 2022-01-01 PROCEDURE — 94761 N-INVAS EAR/PLS OXIMETRY MLT: CPT

## 2022-01-01 PROCEDURE — 99223 1ST HOSP IP/OBS HIGH 75: CPT | Mod: AI,GC,, | Performed by: HOSPITALIST

## 2022-01-01 PROCEDURE — 25000003 PHARM REV CODE 250: Mod: HCNC | Performed by: INTERNAL MEDICINE

## 2022-01-01 PROCEDURE — 27000207 HC ISOLATION: Mod: HCNC

## 2022-01-01 PROCEDURE — 99232 PR SUBSEQUENT HOSPITAL CARE,LEVL II: ICD-10-PCS | Mod: HCNC,95,CR, | Performed by: CLINICAL NURSE SPECIALIST

## 2022-01-01 PROCEDURE — 99223 PR INITIAL HOSPITAL CARE,LEVL III: ICD-10-PCS | Mod: HCNC,CR,, | Performed by: CLINICAL NURSE SPECIALIST

## 2022-01-01 PROCEDURE — 25000003 PHARM REV CODE 250: Mod: HCNC | Performed by: STUDENT IN AN ORGANIZED HEALTH CARE EDUCATION/TRAINING PROGRAM

## 2022-01-01 PROCEDURE — 99291 CRITICAL CARE FIRST HOUR: CPT | Mod: HCNC,GC,, | Performed by: INTERNAL MEDICINE

## 2022-01-01 PROCEDURE — 82803 BLOOD GASES ANY COMBINATION: CPT

## 2022-01-01 PROCEDURE — 80053 COMPREHEN METABOLIC PANEL: CPT | Mod: HCNC

## 2022-01-01 PROCEDURE — 36600 WITHDRAWAL OF ARTERIAL BLOOD: CPT

## 2022-01-01 PROCEDURE — 83735 ASSAY OF MAGNESIUM: CPT | Mod: 91 | Performed by: STUDENT IN AN ORGANIZED HEALTH CARE EDUCATION/TRAINING PROGRAM

## 2022-01-01 PROCEDURE — 20000000 HC ICU ROOM

## 2022-01-01 PROCEDURE — 20000000 HC ICU ROOM: Mod: HCNC

## 2022-01-01 PROCEDURE — 85730 THROMBOPLASTIN TIME PARTIAL: CPT | Mod: 91,HCNC

## 2022-01-01 PROCEDURE — 63600175 PHARM REV CODE 636 W HCPCS: Performed by: NURSE PRACTITIONER

## 2022-01-01 PROCEDURE — 86140 C-REACTIVE PROTEIN: CPT | Performed by: EMERGENCY MEDICINE

## 2022-01-01 PROCEDURE — 99232 PR SUBSEQUENT HOSPITAL CARE,LEVL II: ICD-10-PCS | Mod: HCNC,GC,, | Performed by: INTERNAL MEDICINE

## 2022-01-01 PROCEDURE — 87040 BLOOD CULTURE FOR BACTERIA: CPT | Mod: 59 | Performed by: EMERGENCY MEDICINE

## 2022-01-01 PROCEDURE — 87070 CULTURE OTHR SPECIMN AEROBIC: CPT

## 2022-01-01 PROCEDURE — 99291 CRITICAL CARE FIRST HOUR: CPT | Mod: 25,,, | Performed by: NURSE PRACTITIONER

## 2022-01-01 PROCEDURE — 85025 COMPLETE CBC W/AUTO DIFF WBC: CPT

## 2022-01-01 PROCEDURE — 83735 ASSAY OF MAGNESIUM: CPT | Mod: HCNC

## 2022-01-01 PROCEDURE — A9585 GADOBUTROL INJECTION: HCPCS | Performed by: INTERNAL MEDICINE

## 2022-01-01 PROCEDURE — 99223 1ST HOSP IP/OBS HIGH 75: CPT | Mod: HCNC,CR,, | Performed by: CLINICAL NURSE SPECIALIST

## 2022-01-01 PROCEDURE — 94640 AIRWAY INHALATION TREATMENT: CPT

## 2022-01-01 PROCEDURE — 83036 HEMOGLOBIN GLYCOSYLATED A1C: CPT | Performed by: NURSE PRACTITIONER

## 2022-01-01 PROCEDURE — 89051 BODY FLUID CELL COUNT: CPT

## 2022-01-01 PROCEDURE — 99233 PR SUBSEQUENT HOSPITAL CARE,LEVL III: ICD-10-PCS | Mod: ,,, | Performed by: PSYCHIATRY & NEUROLOGY

## 2022-01-01 PROCEDURE — 99223 PR INITIAL HOSPITAL CARE,LEVL III: ICD-10-PCS | Mod: ,,, | Performed by: PSYCHIATRY & NEUROLOGY

## 2022-01-01 PROCEDURE — 84443 ASSAY THYROID STIM HORMONE: CPT | Performed by: EMERGENCY MEDICINE

## 2022-01-01 PROCEDURE — 80061 LIPID PANEL: CPT | Performed by: NURSE PRACTITIONER

## 2022-01-01 PROCEDURE — 99232 SBSQ HOSP IP/OBS MODERATE 35: CPT | Mod: HCNC,95,CR, | Performed by: CLINICAL NURSE SPECIALIST

## 2022-01-01 PROCEDURE — 25000003 PHARM REV CODE 250: Performed by: NURSE PRACTITIONER

## 2022-01-01 PROCEDURE — 85027 COMPLETE CBC AUTOMATED: CPT

## 2022-01-01 PROCEDURE — 99291 CRITICAL CARE FIRST HOUR: CPT | Mod: 25,HCNC

## 2022-01-01 PROCEDURE — 99358 PROLONG SERVICE W/O CONTACT: CPT | Mod: HCNC,95,CR, | Performed by: CLINICAL NURSE SPECIALIST

## 2022-01-01 PROCEDURE — 85730 THROMBOPLASTIN TIME PARTIAL: CPT | Mod: HCNC | Performed by: INTERNAL MEDICINE

## 2022-01-01 PROCEDURE — 87340 HEPATITIS B SURFACE AG IA: CPT

## 2022-01-01 PROCEDURE — 27000221 HC OXYGEN, UP TO 24 HOURS

## 2022-01-01 PROCEDURE — 87529 HSV DNA AMP PROBE: CPT

## 2022-01-01 PROCEDURE — 25000003 PHARM REV CODE 250: Performed by: STUDENT IN AN ORGANIZED HEALTH CARE EDUCATION/TRAINING PROGRAM

## 2022-01-01 PROCEDURE — 86592 SYPHILIS TEST NON-TREP QUAL: CPT

## 2022-01-01 PROCEDURE — 84145 PROCALCITONIN (PCT): CPT | Performed by: EMERGENCY MEDICINE

## 2022-01-01 PROCEDURE — 86480 TB TEST CELL IMMUN MEASURE: CPT | Mod: HCNC

## 2022-01-01 PROCEDURE — 99233 SBSQ HOSP IP/OBS HIGH 50: CPT | Mod: HCNC,,, | Performed by: PSYCHIATRY & NEUROLOGY

## 2022-01-01 PROCEDURE — 85730 THROMBOPLASTIN TIME PARTIAL: CPT | Mod: 91,HCNC | Performed by: INTERNAL MEDICINE

## 2022-01-01 PROCEDURE — 85730 THROMBOPLASTIN TIME PARTIAL: CPT | Mod: HCNC

## 2022-01-01 PROCEDURE — 85025 COMPLETE CBC W/AUTO DIFF WBC: CPT | Mod: HCNC

## 2022-01-01 PROCEDURE — 82945 GLUCOSE OTHER FLUID: CPT

## 2022-01-01 PROCEDURE — 83605 ASSAY OF LACTIC ACID: CPT | Mod: 91 | Performed by: STUDENT IN AN ORGANIZED HEALTH CARE EDUCATION/TRAINING PROGRAM

## 2022-01-01 PROCEDURE — 86704 HEP B CORE ANTIBODY TOTAL: CPT

## 2022-01-01 PROCEDURE — 25500020 PHARM REV CODE 255: Mod: HCNC | Performed by: INTERNAL MEDICINE

## 2022-01-01 PROCEDURE — 99233 PR SUBSEQUENT HOSPITAL CARE,LEVL III: ICD-10-PCS | Mod: HCNC,,, | Performed by: PSYCHIATRY & NEUROLOGY

## 2022-01-01 PROCEDURE — 93005 ELECTROCARDIOGRAM TRACING: CPT

## 2022-01-01 PROCEDURE — 27000221 HC OXYGEN, UP TO 24 HOURS: Mod: HCNC

## 2022-01-01 PROCEDURE — 93010 ELECTROCARDIOGRAM REPORT: CPT | Mod: ,,, | Performed by: STUDENT IN AN ORGANIZED HEALTH CARE EDUCATION/TRAINING PROGRAM

## 2022-01-01 PROCEDURE — 62270 LUMBAR PUNCTURE: ICD-10-PCS | Mod: GC,,, | Performed by: INTERNAL MEDICINE

## 2022-01-01 PROCEDURE — 25500020 PHARM REV CODE 255: Performed by: INTERNAL MEDICINE

## 2022-01-01 PROCEDURE — 85379 FIBRIN DEGRADATION QUANT: CPT | Mod: HCNC

## 2022-01-01 PROCEDURE — 84484 ASSAY OF TROPONIN QUANT: CPT | Performed by: EMERGENCY MEDICINE

## 2022-01-01 PROCEDURE — 83605 ASSAY OF LACTIC ACID: CPT

## 2022-01-01 PROCEDURE — 99223 PR INITIAL HOSPITAL CARE,LEVL III: ICD-10-PCS | Mod: AI,GC,, | Performed by: HOSPITALIST

## 2022-01-01 PROCEDURE — 99223 1ST HOSP IP/OBS HIGH 75: CPT | Mod: ,,, | Performed by: PSYCHIATRY & NEUROLOGY

## 2022-01-01 PROCEDURE — 63600175 PHARM REV CODE 636 W HCPCS: Performed by: EMERGENCY MEDICINE

## 2022-01-01 PROCEDURE — 80053 COMPREHEN METABOLIC PANEL: CPT | Performed by: EMERGENCY MEDICINE

## 2022-01-01 PROCEDURE — 25000003 PHARM REV CODE 250: Performed by: INTERNAL MEDICINE

## 2022-01-01 PROCEDURE — 99292 CRITICAL CARE ADDL 30 MIN: CPT | Mod: CS,,, | Performed by: EMERGENCY MEDICINE

## 2022-01-01 PROCEDURE — 12000002 HC ACUTE/MED SURGE SEMI-PRIVATE ROOM

## 2022-01-01 PROCEDURE — 27100171 HC OXYGEN HIGH FLOW UP TO 24 HOURS: Mod: HCNC

## 2022-01-01 PROCEDURE — 99233 SBSQ HOSP IP/OBS HIGH 50: CPT | Mod: ,,, | Performed by: PSYCHIATRY & NEUROLOGY

## 2022-01-01 PROCEDURE — 25000242 PHARM REV CODE 250 ALT 637 W/ HCPCS

## 2022-01-01 PROCEDURE — 63600175 PHARM REV CODE 636 W HCPCS: Mod: HCNC | Performed by: STUDENT IN AN ORGANIZED HEALTH CARE EDUCATION/TRAINING PROGRAM

## 2022-01-01 PROCEDURE — 99497 ADVNCD CARE PLAN 30 MIN: CPT | Mod: HCNC,25,CR, | Performed by: CLINICAL NURSE SPECIALIST

## 2022-01-01 PROCEDURE — 99291 PR CRITICAL CARE, E/M 30-74 MINUTES: ICD-10-PCS | Mod: 25,,, | Performed by: NURSE PRACTITIONER

## 2022-01-01 PROCEDURE — 85027 COMPLETE CBC AUTOMATED: CPT | Mod: HCNC

## 2022-01-01 PROCEDURE — 80048 BASIC METABOLIC PNL TOTAL CA: CPT | Mod: HCNC

## 2022-01-01 PROCEDURE — 99291 CRITICAL CARE FIRST HOUR: CPT | Mod: CS,,, | Performed by: EMERGENCY MEDICINE

## 2022-01-01 PROCEDURE — 80307 DRUG TEST PRSMV CHEM ANLYZR: CPT | Performed by: EMERGENCY MEDICINE

## 2022-01-01 PROCEDURE — 80202 ASSAY OF VANCOMYCIN: CPT

## 2022-01-01 PROCEDURE — 83605 ASSAY OF LACTIC ACID: CPT | Performed by: NURSE PRACTITIONER

## 2022-01-01 PROCEDURE — 82550 ASSAY OF CK (CPK): CPT | Performed by: EMERGENCY MEDICINE

## 2022-01-01 PROCEDURE — 83615 LACTATE (LD) (LDH) ENZYME: CPT | Performed by: EMERGENCY MEDICINE

## 2022-01-01 PROCEDURE — 99292 PR CRITICAL CARE, ADDL 30 MIN: ICD-10-PCS | Mod: CS,,, | Performed by: EMERGENCY MEDICINE

## 2022-01-01 PROCEDURE — 83605 ASSAY OF LACTIC ACID: CPT | Performed by: EMERGENCY MEDICINE

## 2022-01-01 PROCEDURE — U0002 COVID-19 LAB TEST NON-CDC: HCPCS | Performed by: EMERGENCY MEDICINE

## 2022-01-01 PROCEDURE — 85025 COMPLETE CBC W/AUTO DIFF WBC: CPT | Performed by: EMERGENCY MEDICINE

## 2022-01-01 PROCEDURE — 63600175 PHARM REV CODE 636 W HCPCS: Mod: HCNC | Performed by: INTERNAL MEDICINE

## 2022-01-01 PROCEDURE — 87205 SMEAR GRAM STAIN: CPT

## 2022-01-01 PROCEDURE — 82728 ASSAY OF FERRITIN: CPT | Performed by: EMERGENCY MEDICINE

## 2022-01-01 PROCEDURE — 84484 ASSAY OF TROPONIN QUANT: CPT

## 2022-01-01 PROCEDURE — 99291 PR CRITICAL CARE, E/M 30-74 MINUTES: ICD-10-PCS | Mod: GC,,, | Performed by: INTERNAL MEDICINE

## 2022-01-01 PROCEDURE — 99232 SBSQ HOSP IP/OBS MODERATE 35: CPT | Mod: HCNC,GC,, | Performed by: INTERNAL MEDICINE

## 2022-01-01 PROCEDURE — 99497 PR ADVNCD CARE PLAN 30 MIN: ICD-10-PCS | Mod: HCNC,25,CR, | Performed by: CLINICAL NURSE SPECIALIST

## 2022-01-01 PROCEDURE — 83605 ASSAY OF LACTIC ACID: CPT | Mod: 91 | Performed by: NURSE PRACTITIONER

## 2022-01-01 RX ORDER — HEPARIN SODIUM,PORCINE/D5W 25000/250
0-40 INTRAVENOUS SOLUTION INTRAVENOUS CONTINUOUS
Status: DISCONTINUED | OUTPATIENT
Start: 2022-01-01 | End: 2022-01-01

## 2022-01-01 RX ORDER — MORPHINE SULFATE 2 MG/ML
2 INJECTION, SOLUTION INTRAMUSCULAR; INTRAVENOUS EVERY 6 HOURS PRN
Status: DISCONTINUED | OUTPATIENT
Start: 2022-01-01 | End: 2022-01-01

## 2022-01-01 RX ORDER — MORPHINE SULFATE 2 MG/ML
2 INJECTION, SOLUTION INTRAMUSCULAR; INTRAVENOUS
Status: DISCONTINUED | OUTPATIENT
Start: 2022-01-01 | End: 2022-01-01

## 2022-01-01 RX ORDER — LORAZEPAM 2 MG/ML
2 INJECTION INTRAMUSCULAR ONCE
Status: COMPLETED | OUTPATIENT
Start: 2022-01-01 | End: 2022-01-01

## 2022-01-01 RX ORDER — LORAZEPAM 2 MG/ML
0.5 INJECTION INTRAMUSCULAR EVERY 30 MIN PRN
Status: DISCONTINUED | OUTPATIENT
Start: 2022-01-01 | End: 2022-01-01 | Stop reason: HOSPADM

## 2022-01-01 RX ORDER — DEXAMETHASONE SODIUM PHOSPHATE 4 MG/ML
6 INJECTION, SOLUTION INTRA-ARTICULAR; INTRALESIONAL; INTRAMUSCULAR; INTRAVENOUS; SOFT TISSUE ONCE
Status: COMPLETED | OUTPATIENT
Start: 2022-01-01 | End: 2022-01-01

## 2022-01-01 RX ORDER — DEXMEDETOMIDINE HYDROCHLORIDE 4 UG/ML
0-1.4 INJECTION, SOLUTION INTRAVENOUS CONTINUOUS
Status: DISCONTINUED | OUTPATIENT
Start: 2022-01-01 | End: 2022-01-01 | Stop reason: HOSPADM

## 2022-01-01 RX ORDER — GLYCOPYRROLATE 0.2 MG/ML
0.2 INJECTION INTRAMUSCULAR; INTRAVENOUS 4 TIMES DAILY
Status: DISCONTINUED | OUTPATIENT
Start: 2022-01-01 | End: 2022-01-01 | Stop reason: HOSPADM

## 2022-01-01 RX ORDER — MORPHINE SULFATE 2 MG/ML
2 INJECTION, SOLUTION INTRAMUSCULAR; INTRAVENOUS ONCE
Status: COMPLETED | OUTPATIENT
Start: 2022-01-01 | End: 2022-01-01

## 2022-01-01 RX ORDER — NALOXONE HCL 0.4 MG/ML
0.02 VIAL (ML) INJECTION
Status: DISCONTINUED | OUTPATIENT
Start: 2022-01-01 | End: 2022-01-01

## 2022-01-01 RX ORDER — SODIUM CHLORIDE 0.9 % (FLUSH) 0.9 %
10 SYRINGE (ML) INJECTION EVERY 12 HOURS PRN
Status: DISCONTINUED | OUTPATIENT
Start: 2022-01-01 | End: 2022-01-01

## 2022-01-01 RX ORDER — LORAZEPAM 2 MG/ML
0.5 INJECTION INTRAMUSCULAR ONCE
Status: COMPLETED | OUTPATIENT
Start: 2022-01-01 | End: 2022-01-01

## 2022-01-01 RX ORDER — HALOPERIDOL 5 MG/ML
1 INJECTION INTRAMUSCULAR EVERY 4 HOURS PRN
Status: DISCONTINUED | OUTPATIENT
Start: 2022-01-01 | End: 2022-01-01

## 2022-01-01 RX ORDER — MIDAZOLAM HYDROCHLORIDE 1 MG/ML
0.5 INJECTION INTRAMUSCULAR; INTRAVENOUS EVERY 4 HOURS PRN
Status: DISCONTINUED | OUTPATIENT
Start: 2022-01-01 | End: 2022-01-01

## 2022-01-01 RX ORDER — GLUCAGON 1 MG
1 KIT INJECTION
Status: DISCONTINUED | OUTPATIENT
Start: 2022-01-01 | End: 2022-01-01

## 2022-01-01 RX ORDER — QUETIAPINE FUMARATE 25 MG/1
25 TABLET, FILM COATED ORAL 2 TIMES DAILY
Status: DISCONTINUED | OUTPATIENT
Start: 2022-01-01 | End: 2022-01-01

## 2022-01-01 RX ORDER — ASCORBIC ACID 500 MG
500 TABLET ORAL 2 TIMES DAILY
Status: DISCONTINUED | OUTPATIENT
Start: 2022-01-01 | End: 2022-01-01

## 2022-01-01 RX ORDER — BENZONATATE 100 MG/1
100 CAPSULE ORAL 3 TIMES DAILY PRN
Status: DISCONTINUED | OUTPATIENT
Start: 2022-01-01 | End: 2022-01-01

## 2022-01-01 RX ORDER — LIDOCAINE HYDROCHLORIDE 10 MG/ML
1 INJECTION INFILTRATION; PERINEURAL ONCE
Status: COMPLETED | OUTPATIENT
Start: 2022-01-01 | End: 2022-01-01

## 2022-01-01 RX ORDER — DEXMEDETOMIDINE HYDROCHLORIDE 4 UG/ML
0-1.4 INJECTION, SOLUTION INTRAVENOUS CONTINUOUS
Status: DISCONTINUED | OUTPATIENT
Start: 2022-01-01 | End: 2022-01-01

## 2022-01-01 RX ORDER — GADOBUTROL 604.72 MG/ML
8 INJECTION INTRAVENOUS
Status: COMPLETED | OUTPATIENT
Start: 2022-01-01 | End: 2022-01-01

## 2022-01-01 RX ORDER — MUPIROCIN 20 MG/G
OINTMENT TOPICAL 2 TIMES DAILY
Status: DISCONTINUED | OUTPATIENT
Start: 2022-01-01 | End: 2022-01-01

## 2022-01-01 RX ORDER — MORPHINE SULFATE 2 MG/ML
1 INJECTION, SOLUTION INTRAMUSCULAR; INTRAVENOUS EVERY 4 HOURS PRN
Status: DISCONTINUED | OUTPATIENT
Start: 2022-01-01 | End: 2022-01-01

## 2022-01-01 RX ORDER — METOPROLOL TARTRATE 1 MG/ML
5 INJECTION, SOLUTION INTRAVENOUS ONCE
Status: COMPLETED | OUTPATIENT
Start: 2022-01-01 | End: 2022-01-01

## 2022-01-01 RX ORDER — MORPHINE SULFATE 4 MG/ML
4 INJECTION, SOLUTION INTRAMUSCULAR; INTRAVENOUS
Status: DISCONTINUED | OUTPATIENT
Start: 2022-01-01 | End: 2022-01-01 | Stop reason: HOSPADM

## 2022-01-01 RX ORDER — ALBUTEROL SULFATE 90 UG/1
2 AEROSOL, METERED RESPIRATORY (INHALATION) EVERY 4 HOURS PRN
Status: DISCONTINUED | OUTPATIENT
Start: 2022-01-01 | End: 2022-01-01

## 2022-01-01 RX ORDER — MORPHINE SULFATE IN 0.9 % NACL 30 MG/30ML
0-10 PATIENT CONTROLLED ANALGESIA SYRINGE INTRAVENOUS CONTINUOUS
Status: DISCONTINUED | OUTPATIENT
Start: 2022-01-01 | End: 2022-01-01 | Stop reason: HOSPADM

## 2022-01-01 RX ORDER — PHENYLEPHRINE HCL IN 0.9% NACL 1 MG/10 ML
SYRINGE (ML) INTRAVENOUS
Status: COMPLETED
Start: 2022-01-01 | End: 2022-01-01

## 2022-01-01 RX ORDER — HALOPERIDOL 5 MG/ML
1 INJECTION INTRAMUSCULAR EVERY 4 HOURS PRN
Status: DISCONTINUED | OUTPATIENT
Start: 2022-01-01 | End: 2022-01-01 | Stop reason: HOSPADM

## 2022-01-01 RX ORDER — MORPHINE SULFATE 2 MG/ML
INJECTION, SOLUTION INTRAMUSCULAR; INTRAVENOUS
Status: COMPLETED
Start: 2022-01-01 | End: 2022-01-01

## 2022-01-01 RX ORDER — LORAZEPAM 0.5 MG/1
1 TABLET ORAL EVERY 30 MIN PRN
Status: DISCONTINUED | OUTPATIENT
Start: 2022-01-01 | End: 2022-01-01

## 2022-01-01 RX ORDER — LEVOTHYROXINE SODIUM 25 UG/1
50 TABLET ORAL DAILY
Status: DISCONTINUED | OUTPATIENT
Start: 2022-01-01 | End: 2022-01-01

## 2022-01-01 RX ORDER — DEXMEDETOMIDINE HYDROCHLORIDE 4 UG/ML
INJECTION, SOLUTION INTRAVENOUS
Status: COMPLETED
Start: 2022-01-01 | End: 2022-01-01

## 2022-01-01 RX ORDER — ENOXAPARIN SODIUM 100 MG/ML
1 INJECTION SUBCUTANEOUS 2 TIMES DAILY
Status: CANCELLED | OUTPATIENT
Start: 2022-01-01

## 2022-01-01 RX ORDER — IBUPROFEN 200 MG
24 TABLET ORAL
Status: DISCONTINUED | OUTPATIENT
Start: 2022-01-01 | End: 2022-01-01

## 2022-01-01 RX ORDER — NOREPINEPHRINE BITARTRATE/D5W 4MG/250ML
PLASTIC BAG, INJECTION (ML) INTRAVENOUS
Status: COMPLETED
Start: 2022-01-01 | End: 2022-01-01

## 2022-01-01 RX ORDER — SODIUM CHLORIDE 0.9 % (FLUSH) 0.9 %
10 SYRINGE (ML) INJECTION
Status: CANCELLED | OUTPATIENT
Start: 2022-01-01

## 2022-01-01 RX ORDER — NOREPINEPHRINE BITARTRATE/D5W 4MG/250ML
0-3 PLASTIC BAG, INJECTION (ML) INTRAVENOUS CONTINUOUS
Status: DISCONTINUED | OUTPATIENT
Start: 2022-01-01 | End: 2022-01-01

## 2022-01-01 RX ORDER — BISACODYL 10 MG
10 SUPPOSITORY, RECTAL RECTAL ONCE
Status: COMPLETED | OUTPATIENT
Start: 2022-01-01 | End: 2022-01-01

## 2022-01-01 RX ORDER — IBUPROFEN 200 MG
16 TABLET ORAL
Status: DISCONTINUED | OUTPATIENT
Start: 2022-01-01 | End: 2022-01-01

## 2022-01-01 RX ORDER — MIDAZOLAM HYDROCHLORIDE 1 MG/ML
2 INJECTION INTRAMUSCULAR; INTRAVENOUS ONCE
Status: COMPLETED | OUTPATIENT
Start: 2022-01-01 | End: 2022-01-01

## 2022-01-01 RX ORDER — TALC
6 POWDER (GRAM) TOPICAL NIGHTLY PRN
Status: CANCELLED | OUTPATIENT
Start: 2022-01-01

## 2022-01-01 RX ORDER — ALBUTEROL SULFATE 90 UG/1
2 AEROSOL, METERED RESPIRATORY (INHALATION) EVERY 8 HOURS
Status: DISCONTINUED | OUTPATIENT
Start: 2022-01-01 | End: 2022-01-01

## 2022-01-01 RX ORDER — ENOXAPARIN SODIUM 100 MG/ML
1 INJECTION SUBCUTANEOUS EVERY 12 HOURS
Status: DISCONTINUED | OUTPATIENT
Start: 2022-01-01 | End: 2022-01-01

## 2022-01-01 RX ORDER — MORPHINE SULFATE IN 0.9 % NACL 30 MG/30ML
0-10 PATIENT CONTROLLED ANALGESIA SYRINGE INTRAVENOUS CONTINUOUS
Status: DISCONTINUED | OUTPATIENT
Start: 2022-01-01 | End: 2022-01-01

## 2022-01-01 RX ORDER — MIDAZOLAM HYDROCHLORIDE 1 MG/ML
1 INJECTION INTRAMUSCULAR; INTRAVENOUS ONCE
Status: COMPLETED | OUTPATIENT
Start: 2022-01-01 | End: 2022-01-01

## 2022-01-01 RX ORDER — QUETIAPINE FUMARATE 25 MG/1
25 TABLET, FILM COATED ORAL ONCE AS NEEDED
Status: COMPLETED | OUTPATIENT
Start: 2022-01-01 | End: 2022-01-01

## 2022-01-01 RX ORDER — DEXAMETHASONE SODIUM PHOSPHATE 100 MG/10ML
6 INJECTION INTRAMUSCULAR; INTRAVENOUS EVERY 24 HOURS
Status: DISCONTINUED | OUTPATIENT
Start: 2022-01-01 | End: 2022-01-01

## 2022-01-01 RX ORDER — ACETAMINOPHEN 325 MG/1
650 TABLET ORAL EVERY 4 HOURS PRN
Status: DISCONTINUED | OUTPATIENT
Start: 2022-01-01 | End: 2022-01-01

## 2022-01-01 RX ORDER — LORAZEPAM 0.5 MG/1
2 TABLET ORAL ONCE
Status: DISCONTINUED | OUTPATIENT
Start: 2022-01-01 | End: 2022-01-01 | Stop reason: HOSPADM

## 2022-01-01 RX ORDER — POLYETHYLENE GLYCOL 3350 17 G/17G
17 POWDER, FOR SOLUTION ORAL DAILY
Status: DISCONTINUED | OUTPATIENT
Start: 2022-01-01 | End: 2022-01-01

## 2022-01-01 RX ORDER — POTASSIUM CHLORIDE 20 MEQ/1
40 TABLET, EXTENDED RELEASE ORAL ONCE
Status: COMPLETED | OUTPATIENT
Start: 2022-01-01 | End: 2022-01-01

## 2022-01-01 RX ORDER — FUROSEMIDE 10 MG/ML
60 INJECTION INTRAMUSCULAR; INTRAVENOUS ONCE
Status: COMPLETED | OUTPATIENT
Start: 2022-01-01 | End: 2022-01-01

## 2022-01-01 RX ADMIN — SODIUM CHLORIDE 500 ML: 0.9 INJECTION, SOLUTION INTRAVENOUS at 08:02

## 2022-01-01 RX ADMIN — MORPHINE SULFATE 1 MG: 2 INJECTION, SOLUTION INTRAMUSCULAR; INTRAVENOUS at 02:02

## 2022-01-01 RX ADMIN — ALBUTEROL SULFATE 2 PUFF: 108 INHALANT RESPIRATORY (INHALATION) at 03:02

## 2022-01-01 RX ADMIN — LORAZEPAM 0.5 MG: 2 INJECTION INTRAMUSCULAR at 06:02

## 2022-01-01 RX ADMIN — PIPERACILLIN AND TAZOBACTAM 4.5 G: 4; .5 INJECTION, POWDER, LYOPHILIZED, FOR SOLUTION INTRAVENOUS; PARENTERAL at 04:02

## 2022-01-01 RX ADMIN — MIDAZOLAM 0.5 MG: 1 INJECTION INTRAMUSCULAR; INTRAVENOUS at 01:02

## 2022-01-01 RX ADMIN — ALBUTEROL SULFATE 2 PUFF: 108 INHALANT RESPIRATORY (INHALATION) at 12:02

## 2022-01-01 RX ADMIN — DEXAMETHASONE SODIUM PHOSPHATE 6 MG: 10 INJECTION INTRAMUSCULAR; INTRAVENOUS at 09:02

## 2022-01-01 RX ADMIN — POLYETHYLENE GLYCOL 3350 17 G: 17 POWDER, FOR SOLUTION ORAL at 11:02

## 2022-01-01 RX ADMIN — DEXTROSE 500 ML: 5 SOLUTION INTRAVENOUS at 11:02

## 2022-01-01 RX ADMIN — SODIUM CHLORIDE 1000 ML: 0.9 INJECTION, SOLUTION INTRAVENOUS at 09:02

## 2022-01-01 RX ADMIN — REMDESIVIR 100 MG: 100 INJECTION, POWDER, LYOPHILIZED, FOR SOLUTION INTRAVENOUS at 09:02

## 2022-01-01 RX ADMIN — TOCILIZUMAB 600 MG: 20 INJECTION, SOLUTION, CONCENTRATE INTRAVENOUS at 10:02

## 2022-01-01 RX ADMIN — AMPICILLIN SODIUM AND SULBACTAM SODIUM 3 G: 2; 1 INJECTION, POWDER, FOR SOLUTION INTRAMUSCULAR; INTRAVENOUS at 12:02

## 2022-01-01 RX ADMIN — GLYCOPYRROLATE 0.2 MG: 0.2 INJECTION INTRAMUSCULAR; INTRAVENOUS at 11:02

## 2022-01-01 RX ADMIN — FUROSEMIDE 60 MG: 10 INJECTION, SOLUTION INTRAVENOUS at 05:02

## 2022-01-01 RX ADMIN — MORPHINE SULFATE 2 MG: 2 INJECTION, SOLUTION INTRAMUSCULAR; INTRAVENOUS at 02:02

## 2022-01-01 RX ADMIN — PIPERACILLIN AND TAZOBACTAM 4.5 G: 4; .5 INJECTION, POWDER, LYOPHILIZED, FOR SOLUTION INTRAVENOUS; PARENTERAL at 09:02

## 2022-01-01 RX ADMIN — HEPARIN SODIUM 16 UNITS/KG/HR: 1000 INJECTION, SOLUTION INTRAVENOUS; SUBCUTANEOUS at 01:02

## 2022-01-01 RX ADMIN — DEXMEDETOMIDINE HYDROCHLORIDE 1.4 MCG/KG/HR: 4 INJECTION, SOLUTION INTRAVENOUS at 01:02

## 2022-01-01 RX ADMIN — DEXAMETHASONE SODIUM PHOSPHATE 6 MG: 4 INJECTION INTRA-ARTICULAR; INTRALESIONAL; INTRAMUSCULAR; INTRAVENOUS; SOFT TISSUE at 10:02

## 2022-01-01 RX ADMIN — DEXMEDETOMIDINE HYDROCHLORIDE 1.4 MCG/KG/HR: 4 INJECTION, SOLUTION INTRAVENOUS at 05:02

## 2022-01-01 RX ADMIN — REMDESIVIR 100 MG: 100 INJECTION, POWDER, LYOPHILIZED, FOR SOLUTION INTRAVENOUS at 10:02

## 2022-01-01 RX ADMIN — MUPIROCIN: 20 OINTMENT TOPICAL at 10:02

## 2022-01-01 RX ADMIN — HUMAN ALBUMIN MICROSPHERES AND PERFLUTREN 0.66 MG: 10; .22 INJECTION, SOLUTION INTRAVENOUS at 10:02

## 2022-01-01 RX ADMIN — MIDAZOLAM 0.5 MG: 1 INJECTION INTRAMUSCULAR; INTRAVENOUS at 04:02

## 2022-01-01 RX ADMIN — MUPIROCIN: 20 OINTMENT TOPICAL at 08:02

## 2022-01-01 RX ADMIN — LIDOCAINE HYDROCHLORIDE 1 ML: 10 INJECTION, SOLUTION INFILTRATION; PERINEURAL at 02:02

## 2022-01-01 RX ADMIN — DEXMEDETOMIDINE HYDROCHLORIDE 0.57 MCG/KG/HR: 4 INJECTION, SOLUTION INTRAVENOUS at 05:02

## 2022-01-01 RX ADMIN — ALBUTEROL SULFATE 2 PUFF: 108 INHALANT RESPIRATORY (INHALATION) at 07:02

## 2022-01-01 RX ADMIN — HEPARIN SODIUM 13 UNITS/KG/HR: 1000 INJECTION, SOLUTION INTRAVENOUS; SUBCUTANEOUS at 09:02

## 2022-01-01 RX ADMIN — MUPIROCIN: 20 OINTMENT TOPICAL at 09:02

## 2022-01-01 RX ADMIN — QUETIAPINE FUMARATE 25 MG: 25 TABLET ORAL at 08:02

## 2022-01-01 RX ADMIN — MORPHINE SULFATE 2 MG: 2 INJECTION, SOLUTION INTRAMUSCULAR; INTRAVENOUS at 10:02

## 2022-01-01 RX ADMIN — DEXMEDETOMIDINE HYDROCHLORIDE 1.4 MCG/KG/HR: 4 INJECTION, SOLUTION INTRAVENOUS at 06:02

## 2022-01-01 RX ADMIN — PIPERACILLIN AND TAZOBACTAM 4.5 G: 4; .5 INJECTION, POWDER, LYOPHILIZED, FOR SOLUTION INTRAVENOUS; PARENTERAL at 01:02

## 2022-01-01 RX ADMIN — POTASSIUM CHLORIDE 40 MEQ: 1500 TABLET, EXTENDED RELEASE ORAL at 06:02

## 2022-01-01 RX ADMIN — DEXAMETHASONE SODIUM PHOSPHATE 6 MG: 10 INJECTION INTRAMUSCULAR; INTRAVENOUS at 08:02

## 2022-01-01 RX ADMIN — DEXMEDETOMIDINE HYDROCHLORIDE 1.4 MCG/KG/HR: 4 INJECTION, SOLUTION INTRAVENOUS at 08:02

## 2022-01-01 RX ADMIN — PIPERACILLIN AND TAZOBACTAM 4.5 G: 4; .5 INJECTION, POWDER, LYOPHILIZED, FOR SOLUTION INTRAVENOUS; PARENTERAL at 12:02

## 2022-01-01 RX ADMIN — VANCOMYCIN HYDROCHLORIDE 1250 MG: 1.25 INJECTION, POWDER, LYOPHILIZED, FOR SOLUTION INTRAVENOUS at 01:02

## 2022-01-01 RX ADMIN — LORAZEPAM 0.5 MG: 2 INJECTION INTRAMUSCULAR; INTRAVENOUS at 11:02

## 2022-01-01 RX ADMIN — METOROPROLOL TARTRATE 5 MG: 5 INJECTION, SOLUTION INTRAVENOUS at 11:02

## 2022-01-01 RX ADMIN — ALBUTEROL SULFATE 2 PUFF: 108 INHALANT RESPIRATORY (INHALATION) at 01:02

## 2022-01-01 RX ADMIN — POLYETHYLENE GLYCOL 3350 17 G: 17 POWDER, FOR SOLUTION ORAL at 08:02

## 2022-01-01 RX ADMIN — LEVOTHYROXINE SODIUM 50 MCG: 0.03 TABLET ORAL at 09:02

## 2022-01-01 RX ADMIN — HEPARIN SODIUM 12 UNITS/KG/HR: 1000 INJECTION, SOLUTION INTRAVENOUS; SUBCUTANEOUS at 12:02

## 2022-01-01 RX ADMIN — MORPHINE SULFATE 2 MG: 2 INJECTION, SOLUTION INTRAMUSCULAR; INTRAVENOUS at 07:02

## 2022-01-01 RX ADMIN — VANCOMYCIN HYDROCHLORIDE 1750 MG: 500 INJECTION, POWDER, LYOPHILIZED, FOR SOLUTION INTRAVENOUS at 10:02

## 2022-01-01 RX ADMIN — ENOXAPARIN SODIUM 70 MG: 100 INJECTION SUBCUTANEOUS at 09:02

## 2022-01-01 RX ADMIN — QUETIAPINE FUMARATE 25 MG: 25 TABLET ORAL at 11:02

## 2022-01-01 RX ADMIN — QUETIAPINE FUMARATE 25 MG: 25 TABLET ORAL at 02:02

## 2022-01-01 RX ADMIN — MORPHINE SULFATE 2 MG: 2 INJECTION, SOLUTION INTRAMUSCULAR; INTRAVENOUS at 04:02

## 2022-01-01 RX ADMIN — ALBUTEROL SULFATE 2 PUFF: 108 INHALANT RESPIRATORY (INHALATION) at 08:02

## 2022-01-01 RX ADMIN — DEXMEDETOMIDINE HYDROCHLORIDE 1.4 MCG/KG/HR: 4 INJECTION, SOLUTION INTRAVENOUS at 09:02

## 2022-01-01 RX ADMIN — DEXMEDETOMIDINE HYDROCHLORIDE 1.4 MCG/KG/HR: 4 INJECTION, SOLUTION INTRAVENOUS at 12:02

## 2022-01-01 RX ADMIN — MORPHINE SULFATE 2 MG: 2 INJECTION, SOLUTION INTRAMUSCULAR; INTRAVENOUS at 05:02

## 2022-01-01 RX ADMIN — PIPERACILLIN AND TAZOBACTAM 4.5 G: 4; .5 INJECTION, POWDER, LYOPHILIZED, FOR SOLUTION INTRAVENOUS; PARENTERAL at 11:02

## 2022-01-01 RX ADMIN — MIDAZOLAM 0.5 MG: 1 INJECTION INTRAMUSCULAR; INTRAVENOUS at 08:02

## 2022-01-01 RX ADMIN — MULTIPLE VITAMINS W/ MINERALS TAB 1 TABLET: TAB at 09:02

## 2022-01-01 RX ADMIN — DEXMEDETOMIDINE HYDROCHLORIDE 0.2 MCG/KG/HR: 4 INJECTION, SOLUTION INTRAVENOUS at 10:02

## 2022-01-01 RX ADMIN — MORPHINE SULFATE 2 MG: 2 INJECTION, SOLUTION INTRAMUSCULAR; INTRAVENOUS at 08:02

## 2022-01-01 RX ADMIN — MULTIPLE VITAMINS W/ MINERALS TAB 1 TABLET: TAB at 08:02

## 2022-01-01 RX ADMIN — DEXAMETHASONE SODIUM PHOSPHATE 6 MG: 10 INJECTION INTRAMUSCULAR; INTRAVENOUS at 10:02

## 2022-01-01 RX ADMIN — DEXMEDETOMIDINE HYDROCHLORIDE 1 MCG/KG/HR: 4 INJECTION, SOLUTION INTRAVENOUS at 08:02

## 2022-01-01 RX ADMIN — BISACODYL 10 MG: 10 SUPPOSITORY RECTAL at 09:02

## 2022-01-01 RX ADMIN — MIDAZOLAM 1 MG: 1 INJECTION INTRAMUSCULAR; INTRAVENOUS at 03:02

## 2022-01-01 RX ADMIN — MORPHINE SULFATE 2 MG: 2 INJECTION, SOLUTION INTRAMUSCULAR; INTRAVENOUS at 11:02

## 2022-01-01 RX ADMIN — DEXMEDETOMIDINE HYDROCHLORIDE 1.1 MCG/KG/HR: 4 INJECTION, SOLUTION INTRAVENOUS at 02:02

## 2022-01-01 RX ADMIN — AMPICILLIN SODIUM AND SULBACTAM SODIUM 3 G: 2; 1 INJECTION, POWDER, FOR SOLUTION INTRAMUSCULAR; INTRAVENOUS at 11:02

## 2022-01-01 RX ADMIN — HALOPERIDOL LACTATE 1 MG: 5 INJECTION, SOLUTION INTRAMUSCULAR at 11:02

## 2022-01-01 RX ADMIN — DEXAMETHASONE SODIUM PHOSPHATE 6 MG: 10 INJECTION INTRAMUSCULAR; INTRAVENOUS at 07:02

## 2022-01-01 RX ADMIN — METOROPROLOL TARTRATE 5 MG: 5 INJECTION, SOLUTION INTRAVENOUS at 02:02

## 2022-01-01 RX ADMIN — DEXMEDETOMIDINE HYDROCHLORIDE 1.3 MCG/KG/HR: 4 INJECTION, SOLUTION INTRAVENOUS at 04:02

## 2022-01-01 RX ADMIN — GADOBUTROL 8 ML: 604.72 INJECTION INTRAVENOUS at 05:02

## 2022-01-01 RX ADMIN — VANCOMYCIN HYDROCHLORIDE 1250 MG: 1.25 INJECTION, POWDER, LYOPHILIZED, FOR SOLUTION INTRAVENOUS at 09:02

## 2022-01-01 RX ADMIN — LORAZEPAM 2 MG: 2 INJECTION INTRAMUSCULAR; INTRAVENOUS at 02:02

## 2022-01-01 RX ADMIN — DEXMEDETOMIDINE HYDROCHLORIDE 1.4 MCG/KG/HR: 4 INJECTION, SOLUTION INTRAVENOUS at 04:02

## 2022-01-01 RX ADMIN — LEVOTHYROXINE SODIUM 50 MCG: 0.03 TABLET ORAL at 06:02

## 2022-01-01 RX ADMIN — METOROPROLOL TARTRATE 5 MG: 5 INJECTION, SOLUTION INTRAVENOUS at 08:02

## 2022-01-01 RX ADMIN — Medication 3 MG/HR: at 02:02

## 2022-01-01 RX ADMIN — DEXMEDETOMIDINE HYDROCHLORIDE 1.4 MCG/KG/HR: 4 INJECTION, SOLUTION INTRAVENOUS at 10:02

## 2022-01-01 RX ADMIN — DEXMEDETOMIDINE HYDROCHLORIDE 1.1 MCG/KG/HR: 4 INJECTION, SOLUTION INTRAVENOUS at 11:02

## 2022-01-01 RX ADMIN — MORPHINE SULFATE 1 MG: 2 INJECTION, SOLUTION INTRAMUSCULAR; INTRAVENOUS at 11:02

## 2022-01-01 RX ADMIN — Medication 200 MCG: at 01:02

## 2022-01-01 RX ADMIN — Medication 0.06 MCG/KG/MIN: at 02:02

## 2022-01-01 RX ADMIN — ALBUTEROL SULFATE 2 PUFF: 108 INHALANT RESPIRATORY (INHALATION) at 04:02

## 2022-01-01 RX ADMIN — PIPERACILLIN AND TAZOBACTAM 4.5 G: 4; .5 INJECTION, POWDER, LYOPHILIZED, FOR SOLUTION INTRAVENOUS; PARENTERAL at 05:02

## 2022-01-01 RX ADMIN — MORPHINE SULFATE 2 MG: 2 INJECTION, SOLUTION INTRAMUSCULAR; INTRAVENOUS at 06:02

## 2022-01-01 RX ADMIN — MIDAZOLAM 2 MG: 1 INJECTION INTRAMUSCULAR; INTRAVENOUS at 02:02

## 2022-01-01 RX ADMIN — MIDAZOLAM 0.5 MG: 1 INJECTION INTRAMUSCULAR; INTRAVENOUS at 03:02

## 2022-01-01 RX ADMIN — REMDESIVIR 200 MG: 100 INJECTION, POWDER, LYOPHILIZED, FOR SOLUTION INTRAVENOUS at 11:02

## 2022-01-01 RX ADMIN — MORPHINE SULFATE 1 MG: 2 INJECTION, SOLUTION INTRAMUSCULAR; INTRAVENOUS at 07:02

## 2022-01-01 RX ADMIN — IOHEXOL 75 ML: 350 INJECTION, SOLUTION INTRAVENOUS at 06:02

## 2022-01-01 RX ADMIN — ENOXAPARIN SODIUM 70 MG: 100 INJECTION SUBCUTANEOUS at 08:02

## 2022-02-03 NOTE — TELEPHONE ENCOUNTER
Spoke with pt's daughter who advises that [facility] does not accept the pt's insurance she will call EMS again.

## 2022-02-03 NOTE — TELEPHONE ENCOUNTER
He obviously needs to be evaluated, probably in ED.  I have not seen him since 2019.  Might need to have ambulance bring him in

## 2022-02-03 NOTE — TELEPHONE ENCOUNTER
Spoke with pt's daughter to advise of MD recommendations.    Verbalized understanding and advised that EMS was called on Monday and pt did know his name and location but stated that we were currently in 2005 and not 2022.    EMS opted to not take the pt to ER and no one has POA.    Provided # for [facility] and pt's family agreeable.    She will call if any further assistance needed.

## 2022-02-03 NOTE — TELEPHONE ENCOUNTER
----- Message from Cornell Griffith sent at 2/3/2022  9:45 AM CST -----  Contact: Tiffanie daughter [phone]  Pt daughter states pt had COVID a week ago. Pt is not eating, drinking nor sleeping. Pt refused to go to the hospital. Pt daughter would like advise from the Dr Office. Please call and advise  Thank you

## 2022-02-04 PROBLEM — J12.82 PNEUMONIA DUE TO COVID-19 VIRUS: Status: ACTIVE | Noted: 2022-01-01

## 2022-02-04 PROBLEM — U07.1 PNEUMONIA DUE TO COVID-19 VIRUS: Status: ACTIVE | Noted: 2022-01-01

## 2022-02-04 PROBLEM — G93.41 ACUTE METABOLIC ENCEPHALOPATHY: Status: ACTIVE | Noted: 2022-01-01

## 2022-02-04 PROBLEM — J96.01 ACUTE HYPOXEMIC RESPIRATORY FAILURE: Status: ACTIVE | Noted: 2022-01-01

## 2022-02-04 PROBLEM — I48.91 A-FIB: Status: ACTIVE | Noted: 2022-01-01

## 2022-02-04 PROBLEM — R79.89 ELEVATED LACTIC ACID LEVEL: Status: ACTIVE | Noted: 2022-01-01

## 2022-02-04 NOTE — HPI
"Mr. Samuel is a 87 yo M w/ a history of HTN and HLD who presents today for SOB. History was obtained from patient's daughter and ER records due to patient being acutely encephalopathic. Daughter states patient tested COVID-19 (+) 1/26. At time of positive test, he endorsed cough, congestion, post sinus drip. Symptoms improved until approximately 3 days prior when he began having increased SOB and fatigue. Family also reports symptoms consistent with hypoactive delirium (non-verbal, "blank" stare), as well as decreased oral intake and a few episodes of diarrhea. Patient's family called EMS twice but patient refused. Today, patient became acutely confused and agitated with labored breathing which prompted family to call EMS again. Of note patient is not vaccinated against COVID-19. He lives with his wife (who is vaccinated) and daughter who is not vaccinated. At baseline, he carries out ADLs independently.      In the ED, patient tachycardic, tachypneic but afebrile. Initially hypoxic w/ improved SpO2 94% on 5L NC. On examination, patient ill appearing, extremely agitated, and w/ increased work of breathing - using accessory muscles and intermittent pursed lip breathing. Labs notable for increased inflammatory markers - ferritin 877, , . Lactate 3.9. No leukocytosis, procal wnl. COVID-19 (+). CXR w/ significant patchy b/l airspace opacities concerning for post viral PNA vs pulmonary edema. CTH (-). Trop 0.075. EKG showing Afib w/ RVR. Received 1 x IV metoprolol 5mg w/ improvement of HR to ~110. Patient received 1.5L IVFs in the ED, broad spectrum abx w/ vanc and zosyn and 1 x IV dexamethasone 6mg. He was admitted to hospital medicine for further management; however, MICU consulted for uptrending lactate and acute hypoxemic respiratory failure.    MICU Consulted for Acute Hypoxemic Respiratory Failure in the setting of COVID-19      "

## 2022-02-04 NOTE — ED NOTES
Patient removing IVs, nasal cannula, trying to climb out of bed. Tonia notified, soft restraints to be ordered.

## 2022-02-04 NOTE — H&P
"[facility] ([place] [address])  Critical Care Medicine  History & Physical    Patient Name: Lalo Samuel  MRN: [id number]  Admission Date: 2/3/2022  Hospital Length of Stay: 1 days  Code Status: No Order  Attending Physician: Ela Vela MD   Primary Care Provider: SHARRON Padilla MD   Principal Problem: Acute hypoxemic respiratory failure    Subjective:     HPI:  Mr. Samuel is a 89 yo M w/ a history of HTN and HLD who presents today for SOB. History was obtained from patient's daughter and ER records due to patient being acutely encephalopathic. Daughter states patient tested COVID-19 (+) 1/26. At time of positive test, he endorsed cough, congestion, post sinus drip. Symptoms improved until approximately 3 days prior when he began having increased SOB and fatigue. Family also reports symptoms consistent with hypoactive delirium (non-verbal, "blank" stare), as well as decreased oral intake and a few episodes of diarrhea. Patient's family called EMS twice but patient refused. Today, patient became acutely confused and agitated with labored breathing which prompted family to call EMS again. Of note patient is not vaccinated against COVID-19. He lives with his wife (who is vaccinated) and daughter who is not vaccinated. At baseline, he carries out ADLs independently.      In the ED, patient tachycardic, tachypneic but afebrile. Initially hypoxic w/ improved SpO2 94% on 5L NC. On examination, patient ill appearing, extremely agitated, and w/ increased work of breathing - using accessory muscles and intermittent pursed lip breathing. Labs notable for increased inflammatory markers - ferritin 877, , . Lactate 3.9. No leukocytosis, procal wnl. COVID-19 (+). CXR w/ significant patchy b/l airspace opacities concerning for post viral PNA vs pulmonary edema. CTH (-). Trop 0.075. EKG showing Afib w/ RVR. Received 1 x IV metoprolol 5mg w/ improvement of HR to ~110. Patient received 1.5L IVFs in " the ED, broad spectrum abx w/ vanc and zosyn and 1 x IV dexamethasone 6mg. He was admitted to hospital medicine for further management; however, MICU consulted for uptrending lactate and acute hypoxemic respiratory failure.    MICU Consulted for Acute Hypoxemic Respiratory Failure in the setting of COVID-          Hospital/ICU Course:  No notes on file     Past Medical History:   Diagnosis Date    Basal cell carcinoma     right lat forehead, right lat eye, right chin, left nose     Dyslipidemia     Elevated PSA     Hypertension     Hypothyroid     Osteoarthritis of both knees        Past Surgical History:   Procedure Laterality Date    CATARACT EXTRACTION      PC IOL OU       Review of patient's allergies indicates:  No Known Allergies    Family History     Problem Relation (Age of Onset)    Hypertension Mother, Father, Sister, Brother        Tobacco Use    Smoking status: Never Smoker    Smokeless tobacco: Never Used   Substance and Sexual Activity    Alcohol use: No    Drug use: No    Sexual activity: Not on file      Review of Systems   Unable to perform ROS: Mental status change   Constitutional: Positive for appetite change.   Respiratory: Positive for shortness of breath.    Cardiovascular: Negative for chest pain.   Gastrointestinal: Positive for diarrhea. Negative for abdominal pain.   Psychiatric/Behavioral: Positive for confusion and dysphoric mood.     Objective:     Vital Signs (Most Recent):  Temp: 97.6 °F (36.4 °C) (02/03/22 1810)  Pulse: (!) 118 (02/04/22 0417)  Resp: (!) 22 (02/04/22 0417)  BP: (!) 140/96 (02/04/22 0417)  SpO2: 95 % (02/04/22 0417) Vital Signs (24h Range):  Temp:  [97.6 °F (36.4 °C)] 97.6 °F (36.4 °C)  Pulse:  [110-137] 118  Resp:  [20-34] 22  SpO2:  [93 %-100 %] 95 %  BP: (140-172)/() 140/96   Weight: 69.9 kg (154 lb)  Body mass index is 26.43 kg/m².      Intake/Output Summary (Last 24 hours) at 2/4/2022 0887  Last data filed at 2/3/2022 2336  Gross per 24 hour    Intake 1000 ml   Output --   Net 1000 ml       Physical Exam  Vitals and nursing note reviewed.   Constitutional:       Appearance: He is ill-appearing.   HENT:      Head: Normocephalic and atraumatic.      Right Ear: External ear normal.      Left Ear: External ear normal.      Nose: Nose normal.      Mouth/Throat:      Pharynx: Oropharynx is clear.   Eyes:      Conjunctiva/sclera: Conjunctivae normal.      Pupils: Pupils are equal, round, and reactive to light.   Cardiovascular:      Rate and Rhythm: Tachycardia present. Rhythm irregular.      Pulses:           Dorsalis pedis pulses are detected w/ Doppler on the left side.      Heart sounds: Normal heart sounds.      Comments: Unable to doppler right DP or PT. Right leg cool to touch.   Pulmonary:      Effort: Pulmonary effort is normal.      Breath sounds: Normal breath sounds.      Comments: Intermittent pursed lip breathing  Abdominal:      General: Abdomen is flat. Bowel sounds are normal. There is no distension.      Palpations: Abdomen is soft.      Tenderness: There is no abdominal tenderness.   Musculoskeletal:         General: No deformity.      Cervical back: Normal range of motion and neck supple.   Skin:     General: Skin is dry.   Neurological:      Mental Status: He is alert.      GCS: GCS eye subscore is 4. GCS verbal subscore is 2. GCS motor subscore is 5.      Comments: Moves all extremities. Mental status precludes comprehensive neuro exam.    Psychiatric:         Speech: He is noncommunicative.         Behavior: Behavior is agitated.         Cognition and Memory: Cognition is impaired.         Vents:     Lines/Drains/Airways     Peripheral Intravenous Line                 Peripheral IV - Single Lumen 02/03/22 2121 20 G Right Antecubital <1 day         Peripheral IV - Single Lumen 02/03/22 2145 20 G Left Hand <1 day              Significant Labs:    CBC/Anemia Profile:  Recent Labs   Lab 02/03/22 2053 02/03/22 2122   WBC 11.71  --    HGB  14.0  --    HCT 45.5  --      --    MCV 81*  --    RDW 16.6*  --    FERRITIN  --  877*        Chemistries:  Recent Labs   Lab 02/03/22  2053      K 4.2      CO2 23   BUN 39*   CREATININE 1.0   CALCIUM 9.2   ALBUMIN 2.6*   PROT 6.1   BILITOT 1.9*   ALKPHOS 92   ALT 21   AST 45*       All pertinent labs within the past 24 hours have been reviewed.    Significant Imaging: I have reviewed all pertinent imaging results/findings within the past 24 hours.    Assessment/Plan:     Neuro  Acute metabolic encephalopathy  -- ? Secondary to Covid?  --CT head neg for acute intracranial abnormality  --TSH WNL  --will eval for other causes of delirium    Pulmonary  * Acute hypoxemic respiratory failure  Patient with Hypoxic Respiratory failure which is Acute.  he is not on home oxygen. Supplemental oxygen was provided and noted-  .   Signs/symptoms of respiratory failure include- tachypnea and increased work of breathing. Contributing diagnoses includes - CHF, Pneumonia and Covid-19 Labs and images were reviewed. Patient Has recent ABG, which has been reviewed. Will treat underlying causes and adjust management of respiratory failure as follows-     --likely multifactorial in setting of Covid-19 infection and suspected HF (elevated BNP with reduced LVEF on bedside echo)  --continue tx for Covid (see above)  --x1 dose lasix now an reassess clinical status  --lower suspicion for superimposed bacterial infection (no leukocytosis and neg procal), however continue broad spectrum antibiotics for now & de-escalate as clinically appropriate  --f/u blood and sputum cx  --checking 2D echo  --currently requiring 6L NC; wean as tolerated    Cardiac/Vascular  A-fib  --new onset; suspect secondary to acute illness  --checking 2D echo  --continue full dose anticoagulation    HTN (hypertension), benign  --home amlodipine currently on hold  --consider starting BB for rate control if pt develops RVR given new onset a fib  --given  elevated BNP and cxry, will give lasix x1 dose now and reassess clinical status    Endocrine  Hypothyroidism  --TSH pending  --continue home synthroid    Other  Elevated lactic acid level  --no obvious source of infection; no leukocytosis and neg procal  --will continue broad spectrum antibiotics for now  --follow up blood and sputum cx  --concern for possible RLE ischemia (extremity cool to touch and unable to doppler pulse) -> checking LE arterial US  --trending lactic    Pneumonia due to COVID-19 virus  --Covid 19 positive on 1/26. Patient is not vaccinated.   --continue dexamethasone and remdesivir  --consider starting baricitinib tomorrow if no clinical improvement  --started on full dose anticoagulation; given mild to moderate level of respiratory support, concern for limb ischemia and new onset a fib, will continue for now   --currently requiring 6L NC; wean as tolerated, however anticipate that his FiO2 requirement will likely worsen given typical disease trajectory          Critical Care Daily Checklist:    A: Awake: RASS Goal/Actual Goal:    Actual:     B: Spontaneous Breathing Trial Performed?     C: SAT & SBT Coordinated?  n/a                   D: Delirium: CAM-ICU     E: Early Mobility Performed? Yes   F: Feeding Goal:    Status:     Current Diet Order   Procedures    Diet NPO      AS: Analgesia/Sedation    T: Thromboembolic Prophylaxis lovenox   H: HOB > 300 Yes   U: Stress Ulcer Prophylaxis (if needed)    G: Glucose Control    B: Bowel Function     I: Indwelling Catheter (Lines & Collins) Necessity Piv, collins   D: De-escalation of Antimicrobials/Pharmacotherapies Vanc, zosyn    Plan for the day/ETD tx resp failure, w/u elevated lactic    Code Status:  Family/Goals of Care: No Order  Discussed with daughter at bedside     Case discussed with CCM Fellow Dr. LIZZIE Monte.    Critical Care Time: 50 minutes  Critical secondary to Patient has a condition that poses threat to life and bodily function: hypoxic  resp failure, elevated lactic, high risk for decompensation     Critical care was time spent personally by me on the following activities: development of treatment plan with patient or surrogate and bedside caregivers, discussions with consultants, evaluation of patient's response to treatment, examination of patient, ordering and performing treatments and interventions, ordering and review of laboratory studies, ordering and review of radiographic studies, pulse oximetry, re-evaluation of patient's condition. This critical care time did not overlap with that of any other provider or involve time for any procedures.     Marlyn Dover NP  Critical Care Medicine  [facility] - Intensive Care ([facility]-15)

## 2022-02-04 NOTE — TELEPHONE ENCOUNTER
Spoke with Tiffanie and father has been admitted to ICU.    She will contact us upon d/c to schedule f/u appt.    Dr. Padilla advised.

## 2022-02-04 NOTE — ASSESSMENT & PLAN NOTE
-- ? Secondary to Covid?  --CT head neg for acute intracranial abnormality  --TSH WNL  --will eval for other causes of delirium

## 2022-02-04 NOTE — ASSESSMENT & PLAN NOTE
--Covid 19 positive on 1/26. Patient is not vaccinated.   --continue dexamethasone and remdesivir  --consider starting baricitinib tomorrow if no clinical improvement  --started on full dose anticoagulation; given mild to moderate level of respiratory support, concern for limb ischemia and new onset a fib, will continue for now   --currently requiring 6L NC; wean as tolerated, however anticipate that his FiO2 requirement will likely worsen given typical disease trajectory

## 2022-02-04 NOTE — H&P
[facility] - Emergency Dept  [facility] Medicine  History & Physical    Patient Name: Lalo Samuel  MRN: [id number]  Patient Class: IP- Inpatient  Admission Date: 2/3/2022  Attending Physician: Rebecca Mike MD   Primary Care Provider: SHARRON Padilla MD       Patient information was obtained from relative(s) and ER records.     Subjective:     Principal Problem:Acute hypoxemic respiratory failure    Chief Complaint:   Chief Complaint   Patient presents with    Altered Mental Status     Pt's LKW was a week ago. Pt is awake but not coherent. Pt just looks around and moans.         HPI: Mr. Samuel is a 89 yo M w/ a history of HTN and HLD who presents today for SOB. History was obtained from patient's daughter and ER records due to patient being acutely encephalopathic. Daughter states patient tested COVID-19 (+) 1/26. At time of positive test, he endorsed cough, congestion, post sinus drip. Symptoms improved until approximately 3 days prior when he began having increased SOB and fatigue. Patient's family called EMS twice but patient refused. Today, patient became acutely confused and agitated which prompted family to call EMS again. Of note patient is not vaccinated against COVID-19. He lives with his wife (who is vaccinated) and daughter who is not vaccinated. At baseline, he carries out ADLs independently.     In the ED, patient tachycardic, tachypneic but afebrile. Initially hypoxic w/ improved SpO2 94% on 5L NC. On examination, patient ill appearing, extremely agitated, and w/ increased work of breathing - using accessory muscles. Labs notable for increased inflammatory markers - ferritin 877, , . Lactate 3.9. No leukocytosis, procal wnl. COVID-19 (+). CXR w/ significant patchy b/l airspace opacities concerning for post viral PNA vs pulmonary edema. CTH (-). Trop 0.075. EKG showing Afib w/ RVR. Received 1 x IV metoprolol 5mg w/ improvement of HR to ~110. Patient received 1.5L IVFs in the ED, broad  spectrum abx w/ vanc and zosyn and 1 x IV dexamethasone 6mg. He was admitted to hospital medicine for further management.       Past Medical History:   Diagnosis Date    Basal cell carcinoma     right lat forehead, right lat eye, right chin, left nose     Dyslipidemia     Elevated PSA     Hypertension     Hypothyroid     Osteoarthritis of both knees        Past Surgical History:   Procedure Laterality Date    CATARACT EXTRACTION      PC IOL OU       Review of patient's allergies indicates:  No Known Allergies    No current facility-administered medications on file prior to encounter.     Current Outpatient Medications on File Prior to Encounter   Medication Sig    amLODIPine (NORVASC) 5 MG tablet Take 1 tablet (5 mg total) by mouth once daily.    finasteride (PROSCAR) 5 mg tablet Take 1 tablet (5 mg total) by mouth once daily.    levothyroxine (SYNTHROID) 50 MCG tablet Take 1 tablet (50 mcg total) by mouth once daily.    traMADol (ULTRAM) 50 mg tablet Take 1 tablet (50 mg total) by mouth every 6 (six) hours as needed for Pain.     Family History     Problem Relation (Age of Onset)    Hypertension Mother, Father, Sister, Brother        Tobacco Use    Smoking status: Never Smoker    Smokeless tobacco: Never Used   Substance and Sexual Activity    Alcohol use: No    Drug use: No    Sexual activity: Not on file     Review of Systems   Unable to perform ROS: Mental status change     Objective:     Vital Signs (Most Recent):  Temp: 97.6 °F (36.4 °C) (02/03/22 1810)  Pulse: (!) 124 (02/03/22 2355)  Resp: (!) 30 (02/03/22 2355)  BP: (!) 165/97 (02/03/22 2343)  SpO2: 96 % (02/03/22 2355) Vital Signs (24h Range):  Temp:  [97.6 °F (36.4 °C)] 97.6 °F (36.4 °C)  Pulse:  [110-137] 124  Resp:  [20-34] 30  SpO2:  [93 %-98 %] 96 %  BP: (148-172)/() 165/97     Weight: 69.9 kg (154 lb)  Body mass index is 26.43 kg/m².    Physical Exam  Constitutional:       General: He is in acute distress.      Appearance:  Normal appearance. He is ill-appearing.   HENT:      Head: Normocephalic and atraumatic.      Right Ear: External ear normal.      Left Ear: External ear normal.      Mouth/Throat:      Mouth: Mucous membranes are dry.      Pharynx: Oropharynx is clear.   Eyes:      Extraocular Movements: Extraocular movements intact.      Conjunctiva/sclera: Conjunctivae normal.   Cardiovascular:      Rate and Rhythm: Regular rhythm. Tachycardia present.      Heart sounds: Normal heart sounds. No murmur heard.       Comments: No JVD appreciated  Pulmonary:      Effort: Respiratory distress present.      Breath sounds: Rales present.      Comments: Breath sounds decreased bilaterally   Increased work of breathing  Pursed lips  5L NC    Abdominal:      General: Bowel sounds are normal.      Palpations: Abdomen is soft. There is no mass.      Tenderness: There is no abdominal tenderness.      Hernia: No hernia is present.   Neurological:      Mental Status: He is alert.             Significant Labs:   All pertinent labs within the past 24 hours have been reviewed.  ABGs:   Recent Labs   Lab 02/03/22 2358   PH 7.420   PCO2 26.5*   HCO3 17.2*   POCSATURATED 88*   BE -7   PO2 52*     CBC:   Recent Labs   Lab 02/03/22 2053   WBC 11.71   HGB 14.0   HCT 45.5        CMP:   Recent Labs   Lab 02/03/22 2053      K 4.2      CO2 23   *   BUN 39*   CREATININE 1.0   CALCIUM 9.2   PROT 6.1   ALBUMIN 2.6*   BILITOT 1.9*   ALKPHOS 92   AST 45*   ALT 21   ANIONGAP 10   EGFRNONAA >60.0     Lactic Acid:   Recent Labs   Lab 02/03/22 2053   LACTATE 3.9*     Troponin:   Recent Labs   Lab 02/03/22 2122   TROPONINI 0.075*       Significant Imaging: I have reviewed all pertinent imaging results/findings within the past 24 hours.     XR CHEST AP PORTABLE  Date: 02/03/2022     FINDINGS:  Cardiac wires overlie the chest.  Cardiomediastinal silhouette is at the upper limits of normal in size.  Atherosclerotic calcifications overlie  the aortic arch.  There are severe patchy bilateral airspace opacities, right side slightly worse than left.  No large pleural effusion.  No pneumothorax.     Impression:     Patchy bilateral airspace opacities suggestive of infectious/inflammatory process or pulmonary edema in the appropriate clinical setting.      Assessment/Plan:     * Acute hypoxemic respiratory failure  Pneumonia due to COVID-19 virus  89 yo M w/ HTN and HLD presenting for SOB and acute confusion. Recently tested COVID-19 (+) 1/26. Not vaccinated against COVID-19. Initially w/ mild URI symptoms but declining function over last 3 days prompting family to call EMS. In ED, tachycardic and tachypneic. Initially hypoxic but improved SpO2 94% on 5L NC. Ill appearing, extremely agitated and w/ increased work of breathing, using accessory muscles. Ferritin 877, , , lactate 3.9. No leukocytosis, procal wnl. COVID-19 (+). CXR w/ significant patchy b/l airspace opacities concerning for post viral PNA vs pulmonary edema. Trop 0.075, likely 2/2 demand ischemia. EKG - Afib w/ RVR. 1 x IV metoprolol 5mg w/ improvement of HR to ~110. 1.5L IVFs in the ED, broad spectrum abx w/ vanc and zosyn & 1 x IV dexamethasone 6mg.      Patient admitted to hospital medicine for suspected post viral PNA in setting of severe COVID-19.   Code Status: full code     Plan:  - target SpO2 92-96%   - Remdesivir 200mg x 1d followed by 100mg QD  - Albuterol PRN  - therapeutic enoxaparin 1mg/kg  - D-dimer 13: will hold on CTA (already treating w/ therapeutic AC in setting of COVID infection). Echo ordered to evaluate for possible R heart strain.  - TTE ordered in the setting of elevated BNP. Will hold on diuresis for now given no JVD or signs of fluid overload.   - continue broad spectrum antibiotics w/ plans to de-escalate to azithromycin and ceftriaxone; high suspicion for concomitant bacterial PNA  - Dexamethasone 6mg PO   - ABG w/ O2 52; patient would likely benefit  from NIV but will hold in setting of acute agitation requiring soft restraints. Work of breathing improving.   - trend troponin to peak  - trend lactate; anticipate improvement w/ IVFs   - MV, vit C/D  - antitussives PRN  - incentive spirometry  - Will continue monitoring patient's work of breathing but low threshold for consulting MICU      A-fib  - EKG confirmed Afib w/ RVR  - patient received 1 x IV metoprolol 5mg; HR improved to ~110s  - Afib likely due to acute illness and hypovolemia  - On therapeutic AC       HTN (hypertension), benign  - hold anti-hypertensives in the setting of acute illness      Hypothyroidism  - continue synthroid       VTE Risk Mitigation (From admission, onward)         Ordered     enoxaparin injection 70 mg  Every 12 hours         02/03/22 2355     IP VTE HIGH RISK PATIENT  Once         02/03/22 2255     Place sequential compression device  Until discontinued         02/03/22 2255     Reason for No Pharmacological VTE Prophylaxis  Once        Question:  Reasons:  Answer:  Physician Provided (leave comment)  Comment:  Therapeutic AC as per COVID protocol    02/03/22 2255                   Angelita Bowie MD  Department of Hospital Medicine   [facility] - Emergency Dept

## 2022-02-04 NOTE — ASSESSMENT & PLAN NOTE
--no obvious source of infection; no leukocytosis and neg procal  --will continue broad spectrum antibiotics for now  --follow up blood and sputum cx  --concern for possible RLE ischemia (extremity cool to touch and unable to doppler pulse) -> checking LE arterial US  --trending lactic

## 2022-02-04 NOTE — ASSESSMENT & PLAN NOTE
Patient with Hypoxic Respiratory failure which is Acute.  he is not on home oxygen. Supplemental oxygen was provided and noted-  .   Signs/symptoms of respiratory failure include- tachypnea and increased work of breathing. Contributing diagnoses includes - CHF, Pneumonia and Covid-19 Labs and images were reviewed. Patient Has recent ABG, which has been reviewed. Will treat underlying causes and adjust management of respiratory failure as follows-     --likely multifactorial in setting of Covid-19 infection and suspected HF (elevated BNP with reduced LVEF on bedside echo)  --continue tx for Covid (see above)  --x1 dose lasix now an reassess clinical status  --lower suspicion for superimposed bacterial infection (no leukocytosis and neg procal), however continue broad spectrum antibiotics for now & de-escalate as clinically appropriate  --f/u blood and sputum cx  --checking 2D echo  --currently requiring 6L NC; wean as tolerated

## 2022-02-04 NOTE — ASSESSMENT & PLAN NOTE
- EKG confirmed Afib w/ RVR  - patient received 1 x IV metoprolol 5mg; HR improved to ~110s  - Afib likely due to acute illness and hypovolemia  - On therapeutic AC

## 2022-02-04 NOTE — PROCEDURES
"Lalo Samuel is a 88 y.o. male patient.    Temp: 97.5 °F (36.4 °C) (02/04/22 1101)  Pulse: 92 (02/04/22 1223)  Resp: (!) 21 (02/04/22 1223)  BP: 97/71 (02/04/22 0901)  SpO2: 98 % (02/04/22 1223)  Weight: 69.9 kg (154 lb) (02/04/22 0759)  Height: 5' 4" (162.6 cm) (02/04/22 0759)       Lumbar Puncture    Date/Time: 2/4/2022 2:37 PM  Location procedure was performed: [facility]  Performed by: Kumar Marks DO  Authorized by: Kumar Marks DO   Assisting provider: Ludmila Pizano MD  Pre-operative diagnosis:  Acute encephalopathy   Post-operative diagnosis: acute encephalopathy  Consent Done: Yes  Indications: evaluation for altered mental status and evaluation for infection  Anesthesia: local infiltration    Anesthesia:  Local Anesthetic: lidocaine 1% without epinephrine  Anesthetic total: 2 mL    Patient sedated: yes  Sedation type: anxiolysis  (See MAR for exact dosages of medications).  Sedatives: midazolam  Vitals: Vital signs were monitored during sedation.  Description of findings: Initial blood tinged fluid that cleared quickly   Patient's position: right lateral decubitus  Needle gauge: 18  Needle type: spinal needle - Quincke tip  Needle length: 3.5 in  Number of attempts: 1  Fluid appearance: blood-tinged then clearing  Tubes of fluid: 4  Total volume: 8 ml  Post-procedure: site cleaned, pressure dressing applied and adhesive bandage applied  Complications: No  Estimated blood loss (mL): 0  Specimens: Yes  Implants: No  Patient tolerance: Patient tolerated the procedure well with no immediate complications          2/4/2022  "

## 2022-02-04 NOTE — ED PROVIDER NOTES
Encounter Date: 2/3/2022       History     Chief Complaint   Patient presents with    Altered Mental Status     Pt's LKW was a week ago. Pt is awake but not coherent. Pt just looks around and moans.      HPI   Lalo Samuel is an 88-year-old male with a history of basal cell carcinoma, dyslipidemia, hypertension, hypothyroidism presenting with altered mental status.  Unable to provide any history from the patient as he appears altered, lethargic and not coherent.  Patient's last well known was approximately 7-8 days ago per his family members.  There is conflicting history from his palm remembers however last normal occurred about 1 week ago when he was able to talk and ambulate.  He has been having declining health since he was diagnosed with COVID 2 weeks ago.  Unable to provide any history by himself.  Patient is awake, able to look around but not coherent.  No reported history of fevers, falls, trauma or injury.    Review of patient's allergies indicates:  No Known Allergies  Past Medical History:   Diagnosis Date    Basal cell carcinoma     right lat forehead, right lat eye, right chin, left nose     Dyslipidemia     Elevated PSA     Hypertension     Hypothyroid     Osteoarthritis of both knees      Past Surgical History:   Procedure Laterality Date    CATARACT EXTRACTION      PC IOL OU     Family History   Problem Relation Age of Onset    Hypertension Mother     Hypertension Father     Hypertension Sister     Hypertension Brother     Amblyopia Neg Hx     Blindness Neg Hx     Cancer Neg Hx     Cataracts Neg Hx     Diabetes Neg Hx     Glaucoma Neg Hx     Macular degeneration Neg Hx     Retinal detachment Neg Hx     Strabismus Neg Hx     Stroke Neg Hx     Thyroid disease Neg Hx     Melanoma Neg Hx      Social History     Tobacco Use    Smoking status: Never Smoker    Smokeless tobacco: Never Used   Substance Use Topics    Alcohol use: No    Drug use: No     Review of Systems    Unable to perform ROS: Mental status change       Physical Exam     Initial Vitals [02/03/22 1810]   BP Pulse Resp Temp SpO2   (!) 148/100 (!) 120 20 97.6 °F (36.4 °C) 95 %      MAP       --         Physical Exam    Nursing note and vitals reviewed.      Gen/Constitutional:  Lethargic, ill-appearing, elderly male lying in stretcher moaning and disoriented  Head: Normocephalic, Atraumatic  Neck: supple, no masses or LAD, no JVD  Eyes: PERRLA, conjunctiva clear  Ears, Nose and Throat: No rhinorrhea or stridor.  Cardiac:  Tachycardic heart rate, irregularly irregular rhythm, No murmur  Pulmonary:  Rhonchorous lung sounds, tachypnea, no increased work of breathing  GI: Abdomen soft, non-tender, non-distended; no rebound or guarding  : No CVA tenderness.  Musculoskeletal: Extremities warm, well perfused, no erythema, no edema  Skin: No rashes, cyanosis or jaundice.  Neuro:  Confused and disoriented, GCS of 12; No focal motor or sensory deficits.    Psych:  Confused disoriented      ED Course   Critical Care    Date/Time: 2/3/2022 10:48 PM  Performed by: Rivas Randall DO  Authorized by: Rivas Randall DO   Direct patient critical care time: 15 minutes  Additional history critical care time: 15 minutes  Ordering / reviewing critical care time: 20 minutes  Documentation critical care time: 10 minutes  Consulting other physicians critical care time: 15 minutes  Consult with family critical care time: 10 minutes  Total critical care time (exclusive of procedural time) : 85 minutes  Critical care was necessary to treat or prevent imminent or life-threatening deterioration of the following conditions: dehydration, sepsis and respiratory failure.  Critical care was time spent personally by me on the following activities: blood draw for specimens, development of treatment plan with patient or surrogate, discussions with consultants, evaluation of patient's response to treatment, examination of patient, obtaining history  from patient or surrogate, ordering and performing treatments and interventions, ordering and review of laboratory studies, ordering and review of radiographic studies, pulse oximetry, re-evaluation of patient's condition and review of old charts.        Labs Reviewed   CBC W/ AUTO DIFFERENTIAL - Abnormal; Notable for the following components:       Result Value    MCV 81 (*)     MCH 24.8 (*)     MCHC 30.8 (*)     RDW 16.6 (*)     Gran # (ANC) 10.9 (*)     Immature Grans (Abs) 0.06 (*)     Lymph # 0.3 (*)     Gran % 93.1 (*)     Lymph % 2.1 (*)     All other components within normal limits   COMPREHENSIVE METABOLIC PANEL - Abnormal; Notable for the following components:    Glucose 111 (*)     BUN 39 (*)     Albumin 2.6 (*)     Total Bilirubin 1.9 (*)     AST 45 (*)     All other components within normal limits   URINALYSIS, REFLEX TO URINE CULTURE - Abnormal; Notable for the following components:    Protein, UA 2+ (*)     Occult Blood UA 1+ (*)     All other components within normal limits    Narrative:     Specimen Source->Urine   LACTIC ACID, PLASMA - Abnormal; Notable for the following components:    Lactate (Lactic Acid) 3.9 (*)     All other components within normal limits   C-REACTIVE PROTEIN - Abnormal; Notable for the following components:    .8 (*)     All other components within normal limits   FERRITIN - Abnormal; Notable for the following components:    Ferritin 877 (*)     All other components within normal limits   LACTATE DEHYDROGENASE - Abnormal; Notable for the following components:     (*)     All other components within normal limits   TROPONIN I - Abnormal; Notable for the following components:    Troponin I 0.075 (*)     All other components within normal limits   URINALYSIS MICROSCOPIC - Abnormal; Notable for the following components:    RBC, UA 5 (*)     All other components within normal limits    Narrative:     Specimen Source->Urine   SARS-COV-2 RDRP GENE - Abnormal; Notable  for the following components:    POC Rapid COVID Positive (*)     All other components within normal limits    Narrative:     This test utilizes isothermal nucleic acid amplification   technology to detect the SARS-CoV-2 RdRp nucleic acid segment.   The analytical sensitivity (limit of detection) is 125 genome   equivalents/mL.   A POSITIVE result implies infection with the SARS-CoV-2 virus;   the patient is presumed to be contagious.     A NEGATIVE result means that SARS-CoV-2 nucleic acids are not   present above the limit of detection. A NEGATIVE result should be   treated as presumptive. It does not rule out the possibility of   COVID-19 and should not be the sole basis for treatment decisions.   If COVID-19 is strongly suspected based on clinical and exposure   history, re-testing using an alternate molecular assay should be   considered.   This test is only for use under the Food and Drug   Administration s Emergency Use Authorization (EUA).   Commercial kits are provided by [employer].   Performance characteristics of the EUA have been independently   verified by [facility] Department of   Pathology and Laboratory Medicine.   _________________________________________________________________   The authorized Fact Sheet for Healthcare Providers and the authorized Fact   Sheet for Patients of the ID NOW COVID-19 are available on the FDA   website:     https://www.fda.gov/media/591793/download  https://www.fda.gov/media/754819/download         POCT GLUCOSE - Abnormal; Notable for the following components:    POCT Glucose 129 (*)     All other components within normal limits   CULTURE, BLOOD   CULTURE, BLOOD   DRUG SCREEN PANEL, URINE EMERGENCY    Narrative:     Specimen Source->Urine   TSH   CK   B-TYPE NATRIURETIC PEPTIDE   PROCALCITONIN   TSH   B-TYPE NATRIURETIC PEPTIDE   PROCALCITONIN   POCT GLUCOSE MONITORING CONTINUOUS     EKG Readings: (Independently Interpreted)   Initial Reading: No  "STEMI. Previous EKG: Compared with most recent EKG Rhythm: Atrial Fibrillation. Heart Rate: 124. ST Segments: Non-Specific ST Segment Depression.       Imaging Results          X-Ray Chest AP Portable (Final result)  Result time 02/03/22 22:17:28    Final result by Himanshu Abreu MD (02/03/22 22:17:28)                 Impression:      Patchy bilateral airspace opacities suggestive of infectious/inflammatory process or pulmonary edema in the appropriate clinical setting.      Electronically signed by: Himanshu Abreu MD  Date:    02/03/2022  Time:    22:17             Narrative:    EXAMINATION:  XR CHEST AP PORTABLE    CLINICAL HISTORY:  Provided history is "  Altered mental status, unspecified".    TECHNIQUE:  One view of the chest.    COMPARISON:  04/27/2011.    FINDINGS:  Cardiac wires overlie the chest.  Cardiomediastinal silhouette is at the upper limits of normal in size.  Atherosclerotic calcifications overlie the aortic arch.  There are severe patchy bilateral airspace opacities, right side slightly worse than left.  No large pleural effusion.  No pneumothorax.                               CT Head Without Contrast (Final result)  Result time 02/03/22 21:42:35    Final result by Rodney Garland MD (02/03/22 21:42:35)                 Impression:      No acute intraparenchymal hemorrhage or major vascular distribution infarction.  Additional evaluation, as clinically warranted.    Generalized cerebral volume loss and chronic ischemic microvascular changes.    Electronically signed by resident: Gustavo Romano MD  Date:    02/03/2022  Time:    21:25    Electronically signed by: Rodney Garland MD  Date:    02/03/2022  Time:    21:42             Narrative:    EXAMINATION:  CT HEAD WITHOUT CONTRAST    CLINICAL HISTORY:  Mental status change, unknown cause;    TECHNIQUE:  Low dose axial CT images obtained throughout the head without intravenous contrast. Sagittal and coronal reconstructions were " performed.    COMPARISON:  None.    FINDINGS:  Intracranial compartment:    Generalized cerebral volume loss with compensatory enlargement of the ventricles and sulci.  No evidence of hydrocephalus.  No extra-axial blood or fluid collections.    Patchy periventricular white matter hypoattenuation which is nonspecific, however likely related to chronic ischemic microvascular changes.  No acute intraparenchymal hemorrhage or major vascular distribution infarction.    Skull/extracranial contents (limited evaluation): No fracture. Mastoid air cells and paranasal sinuses are essentially clear.  Patchy mucosal thickening of the paranasal sinuses and bilateral mastoid air cells.                              X-Rays:   Independently Interpreted Readings:   Chest X-Ray: Normal heart size. Patchy multifocal opacities consistent with multifocal pneumonia, no pneumothorax or free air   Head CT: No hemorrhage.  No skull fracture.  No acute stroke.     Medications   vancomycin 1.75 g in 5 % dextrose 500 mL IVPB (1,750 mg Intravenous New Bag 2/3/22 2248)   sodium chloride 0.9% bolus 500 mL (0 mLs Intravenous Stopped 2/3/22 2245)   piperacillin-tazobactam 4.5 g in sodium chloride 0.9% 100 mL IVPB (ready to mix system) (0 g Intravenous Stopped 2/3/22 2233)   sodium chloride 0.9% bolus 1,000 mL (1,000 mLs Intravenous New Bag 2/3/22 2113)   dexamethasone injection 6 mg (6 mg Intravenous Given 2/3/22 2233)     Medical Decision Making:   History:   I obtained history from: someone other than patient.       <> Summary of History: Daughter at bedside providing much of the history  Old Medical Records: I decided to obtain old medical records.  Initial Assessment:   Lalo Samuel is an 88-year-old male with a history of basal cell carcinoma, dyslipidemia, hypertension, hypothyroidism presenting with altered mental status.  Differential Diagnosis:   Altered mental status, sepsis, stroke, ICH, COVID-19, pneumonia, intra-abdominal process,  "meningitis  Independently Interpreted Test(s):   I have ordered and independently interpreted X-rays - see prior notes.  I have ordered and independently interpreted EKG Reading(s) - see prior notes  Clinical Tests:   Lab Tests: Ordered and Reviewed  Radiological Study: Ordered and Reviewed  Medical Tests: Ordered and Reviewed  Sepsis Perfusion Assessment: "I attest a sepsis perfusion exam was performed within 6 hours of sepsis, severe sepsis, or septic shock presentation, following fluid resuscitation."  Other:   I have discussed this case with another health care provider.       <> Summary of the Discussion: Hospital medicine    Emergent evaluation of a patient presenting with altered mental status.  He is tachycardic, tachypneic, hypertensive without fever.  Initially hypoxemic to 94% requiring 2 L nasal cannula.  Physical exam findings remarkable for altered, disoriented, lethargic and chronically ill and toxic appearing.  Lung sounds coarse throughout all lung fields with abnormal cardiac rhythm and tachycardia.  No focal neurologic deficits however confused, disoriented GCS of 12.  Septic workup initially.  Two large-bore IVs placed, ECG obtained which shows atrial fibrillation with RVR and a rate of 120 with no STEMI on my read.  Placed on cardiac and telemetry monitoring including pulse oximetry.  Sepsis fluid and broad-spectrum antibiotics initiated.  Chest x-ray obtained which shows multifocal patchy infiltrates concerning for multifocal pneumonia versus COVID pneumonia.  He is COVID positive.  He also has a history of COVID positive 2 weeks ago.  Unclear whether this is bacterial pneumonia superimposed on COVID pneumonia.  His lactic acid is 3.9, any appears dehydrated on exam.  After fluid resuscitation, patient's mentation improved but he still confused, disoriented and combative.  Required soft restraints initially.  CT head shows no acute intracranial process on my read.  Do not suspect meningitis " however concern for sepsis.  Remainder is lab slightly elevated for inflammatory markers including troponin, LDH, CRP and ferritin.  Discussed case with Hospital Medicine, will admit to inpatient status to step-down level of care.  Please see critical care note for critical care time.    Complexity:  Critical care                    Clinical Impression:   Final diagnoses:  [R41.82] AMS (altered mental status)  [U07.1] COVID-19 virus infection (Primary)  [U07.1, J12.82] Pneumonia due to COVID-19 virus  [A41.9] Sepsis, due to unspecified organism, unspecified whether acute organ dysfunction present  [J18.9] Multifocal pneumonia          ED Disposition Condition    Admit             Rivas Randall DO, FAAEM  Emergency Staff Physician   Dept of Emergency Medicine   [facility]  Spectralink: [id number]        Disclaimer: This note has been generated using voice-recognition software. There may be typographical errors that have been missed during proof-reading.       Rivas Randall DO  02/03/22 [id number]

## 2022-02-04 NOTE — EICU
Intervention Initiated From:  Bedside    Wilson Communicated with Bedside Nurse regarding:  Time-Out    Nurse Notified:  Yes    Doctor Notified:  Yes    Comments: [id number] called into room for timeout for Dr Pizano & Dr Smith, privileges checked, consent per MD, timeout complete, labs & allergies reviewed with bedside, specimens collected, pressure held to site, covered with Band-Aid, sterilization maintained, tolerated well, VSS

## 2022-02-04 NOTE — PROGRESS NOTES
Pharmacokinetic Initial Assessment & Plan: IV Vancomycin      IV Vancomycin 1750 mg once @ 2248 on 02/03. Continue Vancomycin 1250 mg every 24 hours. Draw a trough 60 min prior to the 3 rd dose on 02/05 @ 2100.  Desired empiric serum trough concentration is 10 to 20 mcg/mL    Pharmacy will continue to follow and monitor vancomycin.    [id number] with any questions regarding this assessment.     Thank you for the consult,   Cheli Phelps       Patient brief summary:  Lalo Samuel is a 88 y.o. male initiated on antimicrobial therapy with IV Vancomycin for treatment of suspected Pneumonia    Drug Allergies:   Review of patient's allergies indicates:  No Known Allergies    Actual Body Weight:   59.9 kg    Renal Function:   Estimated Creatinine Clearance: 42.8 mL/min (based on SCr of 1 mg/dL).,     Dialysis Method (if applicable):  N/A    CBC (last 72 hours):  Recent Labs   Lab Result Units 02/03/22 2053   WBC K/uL 11.71   Hemoglobin g/dL 14.0   Hematocrit % 45.5   Platelets K/uL 316   Gran % % 93.1*   Lymph % % 2.1*   Mono % % 4.2   Eosinophil % % 0.0   Basophil % % 0.1   Differential Method  Automated       Metabolic Panel (last 72 hours):  Recent Labs   Lab Result Units 02/03/22 2053 02/03/22 2146   Sodium mmol/L 143  --    Potassium mmol/L 4.2  --    Chloride mmol/L 110  --    CO2 mmol/L 23  --    Glucose mg/dL 111*  --    Glucose, UA   --  Negative   BUN mg/dL 39*  --    Creatinine mg/dL 1.0  --    Creatinine, Urine mg/dL  --  127.0   Albumin g/dL 2.6*  --    Total Bilirubin mg/dL 1.9*  --    Alkaline Phosphatase U/L 92  --    AST U/L 45*  --    ALT U/L 21  --        Drug levels (last 3 results):  No results for input(s): VANCOMYCINRA, VANCOMYCINPE, VANCOMYCINTR in the last 72 hours.    Microbiologic Results:  Microbiology Results (last 7 days)     Procedure Component Value Units Date/Time    Blood Culture #1 **CANNOT BE ORDERED STAT** [[id number]] Collected: 02/03/22 2058    Order Status: Sent Specimen: Blood  from Peripheral, Wrist, Left Updated: 02/03/22 2107    Blood Culture #2 **CANNOT BE ORDERED STAT** [[id number]] Collected: 02/03/22 2058    Order Status: Sent Specimen: Blood from Peripheral, Antecubital, Right Updated: 02/03/22 2107

## 2022-02-04 NOTE — ASSESSMENT & PLAN NOTE
--home amlodipine currently on hold  --consider starting BB for rate control if pt develops RVR given new onset a fib  --given elevated BNP and cxry, will give lasix x1 dose now and reassess clinical status

## 2022-02-04 NOTE — SUBJECTIVE & OBJECTIVE
Past Medical History:   Diagnosis Date    Basal cell carcinoma     right lat forehead, right lat eye, right chin, left nose     Dyslipidemia     Elevated PSA     Hypertension     Hypothyroid     Osteoarthritis of both knees        Past Surgical History:   Procedure Laterality Date    CATARACT EXTRACTION      PC IOL OU       Review of patient's allergies indicates:  No Known Allergies    Family History     Problem Relation (Age of Onset)    Hypertension Mother, Father, Sister, Brother        Tobacco Use    Smoking status: Never Smoker    Smokeless tobacco: Never Used   Substance and Sexual Activity    Alcohol use: No    Drug use: No    Sexual activity: Not on file      Review of Systems   Unable to perform ROS: Mental status change   Constitutional: Positive for appetite change.   Respiratory: Positive for shortness of breath.    Cardiovascular: Negative for chest pain.   Gastrointestinal: Positive for diarrhea. Negative for abdominal pain.   Psychiatric/Behavioral: Positive for confusion and dysphoric mood.     Objective:     Vital Signs (Most Recent):  Temp: 97.6 °F (36.4 °C) (02/03/22 1810)  Pulse: (!) 118 (02/04/22 0417)  Resp: (!) 22 (02/04/22 0417)  BP: (!) 140/96 (02/04/22 0417)  SpO2: 95 % (02/04/22 0417) Vital Signs (24h Range):  Temp:  [97.6 °F (36.4 °C)] 97.6 °F (36.4 °C)  Pulse:  [110-137] 118  Resp:  [20-34] 22  SpO2:  [93 %-100 %] 95 %  BP: (140-172)/() 140/96   Weight: 69.9 kg (154 lb)  Body mass index is 26.43 kg/m².      Intake/Output Summary (Last 24 hours) at 2/4/2022 0437  Last data filed at 2/3/2022 2336  Gross per 24 hour   Intake 1000 ml   Output --   Net 1000 ml       Physical Exam  Vitals and nursing note reviewed.   Constitutional:       Appearance: He is ill-appearing.   HENT:      Head: Normocephalic and atraumatic.      Right Ear: External ear normal.      Left Ear: External ear normal.      Nose: Nose normal.      Mouth/Throat:      Pharynx: Oropharynx is clear.   Eyes:       Conjunctiva/sclera: Conjunctivae normal.      Pupils: Pupils are equal, round, and reactive to light.   Cardiovascular:      Rate and Rhythm: Tachycardia present. Rhythm irregular.      Pulses:           Dorsalis pedis pulses are detected w/ Doppler on the left side.      Heart sounds: Normal heart sounds.      Comments: Unable to doppler right DP or PT. Right leg cool to touch.   Pulmonary:      Effort: Pulmonary effort is normal.      Breath sounds: Normal breath sounds.      Comments: Intermittent pursed lip breathing  Abdominal:      General: Abdomen is flat. Bowel sounds are normal. There is no distension.      Palpations: Abdomen is soft.      Tenderness: There is no abdominal tenderness.   Musculoskeletal:         General: No deformity.      Cervical back: Normal range of motion and neck supple.   Skin:     General: Skin is dry.   Neurological:      Mental Status: He is alert.      GCS: GCS eye subscore is 4. GCS verbal subscore is 2. GCS motor subscore is 5.      Comments: Moves all extremities. Mental status precludes comprehensive neuro exam.    Psychiatric:         Speech: He is noncommunicative.         Behavior: Behavior is agitated.         Cognition and Memory: Cognition is impaired.         Vents:     Lines/Drains/Airways     Peripheral Intravenous Line                 Peripheral IV - Single Lumen 02/03/22 2121 20 G Right Antecubital <1 day         Peripheral IV - Single Lumen 02/03/22 2145 20 G Left Hand <1 day              Significant Labs:    CBC/Anemia Profile:  Recent Labs   Lab 02/03/22 2053 02/03/22 2122   WBC 11.71  --    HGB 14.0  --    HCT 45.5  --      --    MCV 81*  --    RDW 16.6*  --    FERRITIN  --  877*        Chemistries:  Recent Labs   Lab 02/03/22 2053      K 4.2      CO2 23   BUN 39*   CREATININE 1.0   CALCIUM 9.2   ALBUMIN 2.6*   PROT 6.1   BILITOT 1.9*   ALKPHOS 92   ALT 21   AST 45*       All pertinent labs within the past 24 hours have been  reviewed.    Significant Imaging: I have reviewed all pertinent imaging results/findings within the past 24 hours.

## 2022-02-04 NOTE — CARE UPDATE
In the AM spoke with patients daughter at bedside. We discussed code status given patients acute encephalopathy. He has no documents stating wishes and there is no mPOA. It was determined that he is full code. She spoke with other family members via telephone after our discussion and confirmed code status.

## 2022-02-04 NOTE — HPI
Mr. Samuel is a 89 yo M w/ a history of HTN and HLD who presents today for SOB. History was obtained from patient's daughter and ER records due to patient being acutely encephalopathic. Daughter states patient tested COVID-19 (+) 1/26. At time of positive test, he endorsed cough, congestion, post sinus drip. Symptoms improved until approximately 3 days prior when he began having increased SOB and fatigue. Patient's family called EMS twice but patient refused. Today, patient became acutely confused and agitated which prompted family to call EMS again. Of note patient is not vaccinated against COVID-19. He lives with his wife (who is vaccinated) and daughter who is not vaccinated. At baseline, he carries out ADLs independently.     In the ED, patient tachycardic, tachypneic but afebrile. Initially hypoxic w/ improved SpO2 94% on 5L NC. On examination, patient ill appearing, extremely agitated, and w/ increased work of breathing - using accessory muscles. Labs notable for increased inflammatory markers - ferritin 877, , . Lactate 3.9. No leukocytosis, procal wnl. COVID-19 (+). CXR w/ significant patchy b/l airspace opacities concerning for post viral PNA vs pulmonary edema. Trop 0.075. EKG showing Afib w/ RVR. Received 1 x IV metoprolol 5mg w/ improvement of HR to ~110. Patient received 1.5L IVFs in the ED, broad spectrum abx w/ vanc and zosyn and 1 x IV dexamethasone 6mg. He was admitted to hospital medicine for further management.

## 2022-02-04 NOTE — MEDICAL/APP STUDENT
"[facility] [place]  Medical Student Progress Note      Lalo Samuel  YOB: 1933  Medical Record Number:  [id number]  Attending Physician:  Ela Vela MD   Date of Admission: 2/3/2022       Hospital Day:  1  Current Principal Problem:  Acute hypoxemic respiratory failure      History     Cc: SOB    HPI  Mr. Samuel is a 87 yo M w/ a history of HTN and HLD who presents today for SOB. History was obtained from patient's daughter and ER records due to patient being acutely encephalopathic. Daughter states patient tested COVID-19 (+) 1/26. At time of positive test, he endorsed cough, congestion, post sinus drip. Symptoms improved until approximately 3 days prior when he began having increased SOB and fatigue. Family also reports symptoms consistent with hypoactive delirium (non-verbal, "blank" stare), as well as decreased oral intake and a few episodes of diarrhea. Patient's family called EMS twice but patient refused. Today, patient became acutely confused and agitated with labored breathing which prompted family to call EMS again. Of note patient is not vaccinated against COVID-19. He lives with his wife (who is vaccinated) and daughter who is not vaccinated. At baseline, he carries out ADLs independently.      In the ED, patient tachycardic, tachypneic but afebrile. Initially hypoxic w/ improved SpO2 94% on 5L NC. On examination, patient ill appearing, extremely agitated, and w/ increased work of breathing - using accessory muscles and intermittent pursed lip breathing. Labs notable for increased inflammatory markers - ferritin 877, , . Lactate 3.9. No leukocytosis, procal wnl. COVID-19 (+).    CXR w/ significant patchy b/l airspace opacities concerning for post viral PNA vs pulmonary edema.    CTH (-). Trop 0.075.    EKG showing Afib w/ RVR. Received 1 x IV metoprolol 5mg w/ improvement of HR to ~110. Patient received 1.5L IVFs in the ED, broad spectrum abx w/ vanc " and zosyn and 1 x IV dexamethasone 6mg. He was admitted to hospital medicine for further management; however, MICU consulted for uptrending lactate and acute hypoxemic respiratory failure.    Stepped up to MICU for Acute Hypoxemic Respiratory Failure in the setting of COVID-19      Medications  Scheduled Meds:   albuterol  2 puff Inhalation Q8H    ascorbic acid (vitamin C)  500 mg Oral BID    dexAMETHasone  6 mg Oral Daily    enoxaparin  1 mg/kg Subcutaneous Q12H    levothyroxine  50 mcg Oral Daily    multivitamin  1 tablet Oral Daily    [START ON 2/5/2022] piperacillin-tazobactam (ZOSYN) IVPB  4.5 g Intravenous Q8H    [START ON 2/5/2022] remdesivir infusion  100 mg Intravenous Daily    vancomycin (VANCOCIN) IVPB  1,250 mg Intravenous Q24H     Continuous Infusions:  PRN Meds:.acetaminophen, benzonatate, dextrose 10%, dextrose 10%, glucagon (human recombinant), glucose, glucose, naloxone, sodium chloride 0.9%, Pharmacy to dose Vancomycin consult **AND** vancomycin - pharmacy to dose      PSFH:  Please see admission note and assessment and plan below.      ROS unable to be performed: acutely encephalopathic      Physical Examination     General:  Patient is comfortably lying flat.    Vital Signs  Vitals  Temp: 97.6 °F (36.4 °C)  Temp src: Axillary  Pulse: (!) 125  Heart Rate Source: Monitor,Continuous  Resp: 20  SpO2: (!) 91 %  Pulse Oximetry Type: Continuous  Flow (L/min): 6  O2 Device (Oxygen Therapy): nasal cannula w/ humidification  BP: (!) 175/107  MAP (mmHg): 130          24 Hour VS Range    Temp:  [97.6 °F (36.4 °C)]   Pulse:  [110-137]   Resp:  [20-34]   BP: (140-175)/()   SpO2:  [91 %-100 %]     Intake/Output Summary (Last 24 hours) at 2/4/2022 0834  Last data filed at 2/4/2022 0700  Gross per 24 hour   Intake 1000 ml   Output 550 ml   Net 450 ml       Head: NCAT  Eyes: conjunctivae and lids normal, no scleral icterus, EOMI.  ENMT:  no gingival bleeding, normal oral mucosa without pallor or  cyanosis.   Neck:  JVP normal.  Trachea non-displaced.     Chest:  Increased respiratory effort.  Heart:  Normal S1 S2.  No murmurs.  Abdomen:  Non-distended.  Extremities:  No edema. Normal capillary refill.    Skin:  Cold, pulseless R leg. IV sites without tenderness or inflammation.    Neurological / Psychiatric: Disoriented, acutely encephalopathic.         Data       Recent Labs   Lab 02/03/22 2053   WBC 11.71   HGB 14.0   HCT 45.5           Recent Labs   Lab 02/03/22 2053      K 4.2      CO2 23   BUN 39*   CREATININE 1.0   ANIONGAP 10   CALCIUM 9.2        Recent Labs   Lab 02/03/22 2053   PROT 6.1   ALBUMIN 2.6*   BILITOT 1.9*   ALKPHOS 92   AST 45*   ALT 21        Recent Labs   Lab 02/03/22 2122 02/04/22  0009   TROPONINI 0.075* 0.014        BNP (pg/mL)   Date Value   02/03/2022 693 (H)       Recent Labs   Lab 02/03/22 2058   LABBLOO No Growth to date  No Growth to date      Increased ferritin, CRP, LDH, lactate. Procal WNL    ECG:  AF w/ RVR    CXR: Bilateral, patchy opacities    CTH: no sign of acute bleed or infection    Bedside TTE: decreased EF    Assessment & Plan     89yo M with HTN, HLD who was in his usual state of health until cough, congestion, and postnasal drip and subsequent positive Covid test on 01/26. He has not been vaccinated. Symptoms improved until 3d ago, when SOB began to worsen, accompanied by decreased oral intake, diarrhea, and hypoactive delirium per family. Yesterday, he became agitated with labored breathing and family activated EMS. Received lasix in ED given seveirty of airway disease. Received 1 x IV metoprolol 5mg w/ improvement of HR to ~110. Patient received 1.5L IVFs in the ED, broad spectrum abx w/ vanc and zosyn and 1 x IV dexamethasone 6mg    1. Encephalopathy  Very encephalopathic, though protecting airway.  Ddx: septic, hepatic, uremic, hyponatremic, hypogylcemic, thiamine-deficient, hypoxic-ischemic. Likely metabolic given his lactate trending  upwards, peaked at 5.3. Lactate now down to 3.9    Plan:  - Monitor lactate  - Supportive care, prevent hypoxemia  - ABG  - Treat underlying infection      2. Acute hypoxemic respiratory failur 2/2 pneumonia  Patient had no history of lung disease or oxygen requirements until testing positive for COVID on 01/26 and worsening dyspnea, GIT, and encephalpathy since.  Likely 2/2 covid-19 pneumonia, CAP bugs or legionella also possible given his GI symptoms.    Plan:  - Lung protective ventilation  - Wean FIO2 and PEEP as tolerated  - Alternating BiPAP and CPAP  - Dexamethasone, remdesavir, baricitinib  - Cef and Azithro for CAP coverage  - duonebs q6      3. Possible acute ischemic limb  R leg is cold, pale, pulseless. Doppler venous USS was unremarkable. Lactate trended upwards to 5.3, now down to 3.9. Elevated inflammatory markers.    Plan:  - follow-up arterial USS          Critical Care Daily Checklist:     A: Awake: RASS Goal/Actual Goal:    Actual:     B: Spontaneous Breathing Trial Performed?    C: SAT & SBT Coordinated?  n/a                   D: Delirium: CAM-ICU    E: Early Mobility Performed? Yes   F: Feeding Goal:    Status:         Current Diet Order   Procedures    Diet NPO       AS: Analgesia/Sedation     T: Thromboembolic Prophylaxis lovenox   H: HOB > 300 Yes   U: Stress Ulcer Prophylaxis (if needed)     G: Glucose Control     B: Bowel Function    I: Indwelling Catheter (Lines & Collins) Necessity Piv, collins   D: De-escalation of Antimicrobials/Pharmacotherapies Vanc, zosyn     Plan for the day/ETD tx resp failure, w/u elevated lactic     Code Status:  Family/Goals of Care: No Order  Discussed with daughter at bedside         Sara Melendez, MS4  [facility] [place]

## 2022-02-04 NOTE — NURSING
Pt brought to Room 73255 per stretcher.  Pt is on 6Liters of O2 per NC.  Pt is in Afib on arrival to unit. Daughter is at bedside at this time.  Called Team 2 to inform pt is here in room.  Pt is moaning and not following any commands.  Pt will open eyes and follow. Pt has a collins to bedisde drainage.

## 2022-02-04 NOTE — SUBJECTIVE & OBJECTIVE
Past Medical History:   Diagnosis Date    Basal cell carcinoma     right lat forehead, right lat eye, right chin, left nose     Dyslipidemia     Elevated PSA     Hypertension     Hypothyroid     Osteoarthritis of both knees        Past Surgical History:   Procedure Laterality Date    CATARACT EXTRACTION      PC IOL OU       Review of patient's allergies indicates:  No Known Allergies    No current facility-administered medications on file prior to encounter.     Current Outpatient Medications on File Prior to Encounter   Medication Sig    amLODIPine (NORVASC) 5 MG tablet Take 1 tablet (5 mg total) by mouth once daily.    finasteride (PROSCAR) 5 mg tablet Take 1 tablet (5 mg total) by mouth once daily.    levothyroxine (SYNTHROID) 50 MCG tablet Take 1 tablet (50 mcg total) by mouth once daily.    traMADol (ULTRAM) 50 mg tablet Take 1 tablet (50 mg total) by mouth every 6 (six) hours as needed for Pain.     Family History     Problem Relation (Age of Onset)    Hypertension Mother, Father, Sister, Brother        Tobacco Use    Smoking status: Never Smoker    Smokeless tobacco: Never Used   Substance and Sexual Activity    Alcohol use: No    Drug use: No    Sexual activity: Not on file     Review of Systems   Unable to perform ROS: Mental status change     Objective:     Vital Signs (Most Recent):  Temp: 97.6 °F (36.4 °C) (02/03/22 1810)  Pulse: (!) 124 (02/03/22 2355)  Resp: (!) 30 (02/03/22 2355)  BP: (!) 165/97 (02/03/22 2343)  SpO2: 96 % (02/03/22 2355) Vital Signs (24h Range):  Temp:  [97.6 °F (36.4 °C)] 97.6 °F (36.4 °C)  Pulse:  [110-137] 124  Resp:  [20-34] 30  SpO2:  [93 %-98 %] 96 %  BP: (148-172)/() 165/97     Weight: 69.9 kg (154 lb)  Body mass index is 26.43 kg/m².    Physical Exam  Constitutional:       General: He is in acute distress.      Appearance: Normal appearance. He is ill-appearing.   HENT:      Head: Normocephalic and atraumatic.      Right Ear: External ear normal.       Left Ear: External ear normal.      Mouth/Throat:      Mouth: Mucous membranes are dry.      Pharynx: Oropharynx is clear.   Eyes:      Extraocular Movements: Extraocular movements intact.      Conjunctiva/sclera: Conjunctivae normal.   Cardiovascular:      Rate and Rhythm: Regular rhythm. Tachycardia present.      Heart sounds: Normal heart sounds. No murmur heard.       Comments: No JVD appreciated  Pulmonary:      Effort: Respiratory distress present.      Breath sounds: Rales present.      Comments: Breath sounds decreased bilaterally   Increased work of breathing  Pursed lips  5L NC    Abdominal:      General: Bowel sounds are normal.      Palpations: Abdomen is soft. There is no mass.      Tenderness: There is no abdominal tenderness.      Hernia: No hernia is present.   Neurological:      Mental Status: He is alert.             Significant Labs:   All pertinent labs within the past 24 hours have been reviewed.  ABGs:   Recent Labs   Lab 02/03/22 2358   PH 7.420   PCO2 26.5*   HCO3 17.2*   POCSATURATED 88*   BE -7   PO2 52*     CBC:   Recent Labs   Lab 02/03/22 2053   WBC 11.71   HGB 14.0   HCT 45.5        CMP:   Recent Labs   Lab 02/03/22 2053      K 4.2      CO2 23   *   BUN 39*   CREATININE 1.0   CALCIUM 9.2   PROT 6.1   ALBUMIN 2.6*   BILITOT 1.9*   ALKPHOS 92   AST 45*   ALT 21   ANIONGAP 10   EGFRNONAA >60.0     Lactic Acid:   Recent Labs   Lab 02/03/22 2053   LACTATE 3.9*     Troponin:   Recent Labs   Lab 02/03/22 2122   TROPONINI 0.075*       Significant Imaging: I have reviewed all pertinent imaging results/findings within the past 24 hours.     XR CHEST AP PORTABLE  Date: 02/03/2022     FINDINGS:  Cardiac wires overlie the chest.  Cardiomediastinal silhouette is at the upper limits of normal in size.  Atherosclerotic calcifications overlie the aortic arch.  There are severe patchy bilateral airspace opacities, right side slightly worse than left.  No large pleural  effusion.  No pneumothorax.     Impression:     Patchy bilateral airspace opacities suggestive of infectious/inflammatory process or pulmonary edema in the appropriate clinical setting.

## 2022-02-04 NOTE — ASSESSMENT & PLAN NOTE
89 yo M w/ HTN and HLD presenting for SOB and acute confusion. Recently tested COVID-19 (+) 1/26. Not vaccinated against COVID-19. Initially w/ mild URI symptoms but declining function over last 3 days prompting family to call EMS. In ED, tachycardic and tachypneic. Initially hypoxic but improved SpO2 94% on 5L NC. Ill appearing, extremely agitated and w/ increased work of breathing, using accessory muscles. Ferritin 877, , , lactate 3.9. No leukocytosis, procal wnl. COVID-19 (+). CXR w/ significant patchy b/l airspace opacities concerning for post viral PNA vs pulmonary edema. EKG - Afib w/ RVR. 1 x IV metoprolol 5mg w/ improvement of HR to ~110. 1.5L IVFs in the ED, broad spectrum abx w/ vanc and zosyn & 1 x IV dexamethasone 6mg.      Patient admitted to hospital medicine for suspected post viral PNA in setting of severe COVID-19.   Code Status: full code     Plan:  - target SpO2 92-96%   - Remdesivir 200mg x 1d followed by 100mg QD  - Albuterol PRN  - therapeutic enoxaparin 1mg/kg  - D-dimer 13: will hold on CTA (already treating w/ therapeutic AC in setting of COVID infection). Echo ordered to evaluate for possible R heart strain  - TTE ordered in the setting of elevated BNP   - continue broad spectrum antibiotics w/ plans to de-escalate to azithromycin and ceftriaxone; high suspicion for concomitant bacterial PNA  - Dexamethasone 6mg PO   - ABG w/ O2 52; patient would likely benefit from NIV but will hold in setting of acute agitation requiring soft restraints. Work of breathing improving.   - MV, vit C/D  - antitussives PRN  - incentive spirometry  - low threshold for contacting MICU

## 2022-02-04 NOTE — CONSULTS
Consult received and patient examined. To be transferred to ICU. Full HPI to follow.    Vadim Monte DO  PGY-IV, [employer] PCCM Fellow  4:40 AM

## 2022-02-04 NOTE — ASSESSMENT & PLAN NOTE
87 yo M w/ HTN and HLD presenting for SOB and acute confusion. Recently tested COVID-19 (+) 1/26. Not vaccinated against COVID-19. Initially w/ mild URI symptoms but declining function over last 3 days prompting family to call EMS. In ED, tachycardic and tachypneic. Initially hypoxic but improved SpO2 94% on 5L NC. Ill appearing, extremely agitated and w/ increased work of breathing, using accessory muscles. Ferritin 877, , , lactate 3.9. No leukocytosis, procal wnl. COVID-19 (+). CXR w/ significant patchy b/l airspace opacities concerning for post viral PNA vs pulmonary edema. EKG - Afib w/ RVR. 1 x IV metoprolol 5mg w/ improvement of HR to ~110. 1.5L IVFs in the ED, broad spectrum abx w/ vanc and zosyn & 1 x IV dexamethasone 6mg.      Patient admitted to hospital medicine for suspected post viral PNA in setting of severe COVID-19.   Code Status: full code     Plan:  - target SpO2 92-96%   - Remdesivir 200mg x 1d followed by 100mg QD  - Albuterol PRN  - therapeutic enoxaparin 1mg/kg  - TTE ordered in the setting of elevated BNP.--> EF 20%, LVDD, biatrial enlargement, mild MR, TR; PAP51, CVP 8  - continue broad spectrum antibiotics w/ plans to de-escalate to azithromycin and ceftriaxone; high suspicion for concomitant bacterial PNA  - Dexamethasone 6mg PO   - Baricitinib 2 mg PO  - Diuresed with IV Lasix 40 mg x1   - MV, vit C/D  - antitussives PRN  - incentive spirometry  - Continue to monitor patient very closely given work of breathing.

## 2022-02-04 NOTE — ED TRIAGE NOTES
Lalo Samuel, a 88 y.o. male presents to the ED w/ complaint of altered mental status. Patient family reports he was diagnosed with Covid 2 weeks ago. Family reports he has progressively worsened. Pt not vaccinated. Patietn rolling around, moaning, groaning, and not answering questions/following commands. Patient placed on 5L nasal cannula in EMS triage. Pt family reports they have called the ambulance 3x but pt would not come to hospital.     Triage note:  Chief Complaint   Patient presents with    Altered Mental Status     Pt's LKW was a week ago. Pt is awake but not coherent. Pt just looks around and moans.      Review of patient's allergies indicates:  No Known Allergies  Past Medical History:   Diagnosis Date    Basal cell carcinoma     right lat forehead, right lat eye, right chin, left nose     Dyslipidemia     Elevated PSA     Hypertension     Hypothyroid     Osteoarthritis of both knees

## 2022-02-05 PROBLEM — I63.89 ACUTE ARTERIAL ISCHEMIC STROKE, MULTIFOCAL, MULTIPLE VASCULAR TERRITORIES: Status: ACTIVE | Noted: 2022-01-01

## 2022-02-05 PROBLEM — E78.2 MIXED HYPERLIPIDEMIA: Status: ACTIVE | Noted: 2022-01-01

## 2022-02-05 NOTE — ASSESSMENT & PLAN NOTE
--Home amlodipine currently on hold  --Consider starting BB for rate control if pt develops RVR given new onset a fib; would also benefit from GDMT given reduced EF  --given elevated BNP and cxr concerning for pulm edema, given lasix x1 dose with good UOP

## 2022-02-05 NOTE — SUBJECTIVE & OBJECTIVE
Interval History/Significant Events: NAEO. AF VSS. On 11L HFNC, stable oxygenation. Pending MRI Brain for AMS. Will trial precedex gtt for acute agitation while obtaining MRI. Undergoing COVID treatment protocol.    Review of Systems   Unable to perform ROS: Mental status change   Constitutional: Positive for appetite change. Negative for chills, fatigue and fever.   HENT: Negative for congestion, rhinorrhea and sore throat.    Respiratory: Positive for shortness of breath. Negative for chest tightness.    Cardiovascular: Negative for chest pain.   Gastrointestinal: Negative for abdominal pain, constipation, diarrhea, nausea and vomiting.   Genitourinary: Negative for dysuria, frequency and urgency.   Musculoskeletal: Negative for arthralgias and neck pain.   Neurological: Negative for dizziness, weakness, numbness and headaches.   Psychiatric/Behavioral: Positive for confusion and dysphoric mood.     Objective:     Vital Signs (Most Recent):  Temp: 97 °F (36.1 °C) (02/05/22 0300)  Pulse: (!) 119 (02/05/22 0823)  Resp: 20 (02/05/22 0831)  BP: 131/86 (02/05/22 0630)  SpO2: 96 % (02/05/22 0823) Vital Signs (24h Range):  Temp:  [97 °F (36.1 °C)-97.5 °F (36.4 °C)] 97 °F (36.1 °C)  Pulse:  [] 119  Resp:  [20-30] 20  SpO2:  [79 %-100 %] 96 %  BP: (108-181)/() 131/86   Weight: 69.9 kg (154 lb)  Body mass index is 26.43 kg/m².      Intake/Output Summary (Last 24 hours) at 2/5/2022 0926  Last data filed at 2/5/2022 0600  Gross per 24 hour   Intake --   Output 1290 ml   Net -1290 ml       Physical Exam  Vitals and nursing note reviewed.   Constitutional:       General: He is not in acute distress.     Appearance: Normal appearance. He is normal weight. He is ill-appearing. He is not toxic-appearing.   HENT:      Head: Normocephalic and atraumatic.      Right Ear: External ear normal.      Left Ear: External ear normal.      Nose: Nose normal. No congestion or rhinorrhea.      Mouth/Throat:      Mouth: Mucous  membranes are moist.      Pharynx: Oropharynx is clear. No oropharyngeal exudate or posterior oropharyngeal erythema.   Eyes:      Extraocular Movements: Extraocular movements intact.      Conjunctiva/sclera: Conjunctivae normal.   Cardiovascular:      Rate and Rhythm: Normal rate. Rhythm irregular.      Pulses:           Dorsalis pedis pulses are detected w/ Doppler on the left side.      Heart sounds: Normal heart sounds. No murmur heard.  No friction rub. No gallop.    Pulmonary:      Effort: Pulmonary effort is normal. No respiratory distress.      Breath sounds: Wheezing and rales present. No rhonchi.      Comments: Intermittent pursed lip breathing  Abdominal:      General: Abdomen is flat. Bowel sounds are normal. There is no distension.      Palpations: Abdomen is soft.      Tenderness: There is no abdominal tenderness. There is no guarding.   Musculoskeletal:         General: No swelling, tenderness or deformity. Normal range of motion.      Cervical back: Normal range of motion and neck supple.      Right lower leg: No edema.      Left lower leg: No edema.   Skin:     General: Skin is warm and dry.      Capillary Refill: Capillary refill takes less than 2 seconds.   Neurological:      General: No focal deficit present.      Mental Status: He is alert and oriented to person, place, and time. Mental status is at baseline.      GCS: GCS eye subscore is 4. GCS verbal subscore is 3. GCS motor subscore is 5.      Comments: Moves all extremities. Mental status precludes comprehensive neuro exam.    Psychiatric:         Speech: He is noncommunicative.         Behavior: Behavior is agitated.         Cognition and Memory: Cognition is impaired.         Vents:     Lines/Drains/Airways     Drain                 Urethral Catheter 02/04/22 0504 Straight-tip 18 Fr. 1 day          Peripheral Intravenous Line                 Peripheral IV - Single Lumen 02/03/22 2121 20 G Right Antecubital 1 day         Peripheral IV -  Single Lumen 02/03/22 2145 20 G Left Hand 1 day              Significant Labs:    CBC/Anemia Profile:  Recent Labs   Lab 02/03/22 2053 02/03/22 2122 02/05/22 0347   WBC 11.71  --  12.90*   HGB 14.0  --  12.3*   HCT 45.5  --  38.8*     --  153   MCV 81*  --  79*   RDW 16.6*  --  16.2*   FERRITIN  --  877*  --         Chemistries:  Recent Labs   Lab 02/03/22 2053 02/05/22  0347    145   K 4.2 3.6    114*   CO2 23 23   BUN 39* 52*   CREATININE 1.0 1.2   CALCIUM 9.2 8.2*   ALBUMIN 2.6* 2.2*   PROT 6.1 4.8*   BILITOT 1.9* 1.7*   ALKPHOS 92 90   ALT 21 24   AST 45* 69*   MG  --  2.2   PHOS  --  4.3       All pertinent labs within the past 24 hours have been reviewed.    Significant Imaging:  Imaging Results          US Lower Extremity Arteries Bilateral (Final result)  Result time 02/04/22 10:44:44    Final result by William Dempsey MD (02/04/22 10:44:44)                 Impression:      Patent lower extremity arterial system.  Sampled velocities within normal limits.    Electronically signed by resident: Magdiel Arora  Date:    02/04/2022  Time:    10:31    Electronically signed by: William Dempsey MD  Date:    02/04/2022  Time:    10:44             Narrative:    EXAMINATION:  US LOWER EXTREMITY ARTERIES BILATERAL    CLINICAL HISTORY:  Leg ischemia concern;    TECHNIQUE:  Bilateral lower extremity arterial duplex ultrasound examination performed. Multiple gray scale and color doppler images were obtained in addition to waveform analysis per COVID protocol.    COMPARISON:  None    FINDINGS:  The peak systolic velocities on the right are as follows, in centimeters/second:    Common femoral artery: 71    Superficial femoral artery, proximal: 60    Superficial femoral artery, mid portion: 50    Superficial femoral artery, distal: 55    Proximal popliteal artery: 34    Distal popliteal artery: 22    Anterior tibial artery: 28    Posterior tibial artery: 39    The peak systolic velocities on the left are as  "follows, in centimeters/second:    Common femoral artery: 49    Superficial femoral artery, proximal: 62    Superficial femoral artery, mid portion: 52    Superficial femoral artery, distal: 35    Proximal popliteal artery: 55    Distal popliteal artery: 56    Anterior tibial artery: 32, biphasic waveform    Posterior tibial artery: 62    Normal arterial waveforms are demonstrated except where noted above.                               X-Ray Chest AP Portable (Final result)  Result time 02/03/22 22:17:28    Final result by Himanshu Abreu MD (02/03/22 22:17:28)                 Impression:      Patchy bilateral airspace opacities suggestive of infectious/inflammatory process or pulmonary edema in the appropriate clinical setting.      Electronically signed by: Himanshu Abreu MD  Date:    02/03/2022  Time:    22:17             Narrative:    EXAMINATION:  XR CHEST AP PORTABLE    CLINICAL HISTORY:  Provided history is "  Altered mental status, unspecified".    TECHNIQUE:  One view of the chest.    COMPARISON:  04/27/2011.    FINDINGS:  Cardiac wires overlie the chest.  Cardiomediastinal silhouette is at the upper limits of normal in size.  Atherosclerotic calcifications overlie the aortic arch.  There are severe patchy bilateral airspace opacities, right side slightly worse than left.  No large pleural effusion.  No pneumothorax.                               CT Head Without Contrast (Final result)  Result time 02/03/22 21:42:35    Final result by Rodney Garland MD (02/03/22 21:42:35)                 Impression:      No acute intraparenchymal hemorrhage or major vascular distribution infarction.  Additional evaluation, as clinically warranted.    Generalized cerebral volume loss and chronic ischemic microvascular changes.    Electronically signed by resident: Gustavo Romano MD  Date:    02/03/2022  Time:    21:25    Electronically signed by: Rodney Garland MD  Date:    02/03/2022  Time:    21:42             Narrative:    " EXAMINATION:  CT HEAD WITHOUT CONTRAST    CLINICAL HISTORY:  Mental status change, unknown cause;    TECHNIQUE:  Low dose axial CT images obtained throughout the head without intravenous contrast. Sagittal and coronal reconstructions were performed.    COMPARISON:  None.    FINDINGS:  Intracranial compartment:    Generalized cerebral volume loss with compensatory enlargement of the ventricles and sulci.  No evidence of hydrocephalus.  No extra-axial blood or fluid collections.    Patchy periventricular white matter hypoattenuation which is nonspecific, however likely related to chronic ischemic microvascular changes.  No acute intraparenchymal hemorrhage or major vascular distribution infarction.    Skull/extracranial contents (limited evaluation): No fracture. Mastoid air cells and paranasal sinuses are essentially clear.  Patchy mucosal thickening of the paranasal sinuses and bilateral mastoid air cells.                                I have reviewed all pertinent imaging results/findings within the past 24 hours.

## 2022-02-05 NOTE — SUBJECTIVE & OBJECTIVE
Interval History: NAEO. AF VSS. Patient to go for MRI Brain today. Has been agitated since admission, will trial precedex gtt to complete MRI. Undergoing COVID treatment protocol.    Review of Systems   Unable to perform ROS: Mental status change   Constitutional: Positive for appetite change. Negative for chills, fatigue and fever.   HENT: Negative for congestion, rhinorrhea and sore throat.    Respiratory: Positive for shortness of breath. Negative for chest tightness.    Cardiovascular: Negative for chest pain.   Gastrointestinal: Negative for abdominal pain, constipation, diarrhea, nausea and vomiting.   Genitourinary: Negative for dysuria, frequency and urgency.   Musculoskeletal: Negative for arthralgias and neck pain.   Neurological: Negative for dizziness, weakness, numbness and headaches.   Psychiatric/Behavioral: Positive for confusion and dysphoric mood.     Objective:     Vital Signs (Most Recent):  Temp: 97 °F (36.1 °C) (02/05/22 0300)  Pulse: (!) 119 (02/05/22 0823)  Resp: 20 (02/05/22 0831)  BP: 131/86 (02/05/22 0630)  SpO2: 96 % (02/05/22 0823) Vital Signs (24h Range):  Temp:  [97 °F (36.1 °C)-97.5 °F (36.4 °C)] 97 °F (36.1 °C)  Pulse:  [] 119  Resp:  [20-30] 20  SpO2:  [79 %-100 %] 96 %  BP: (108-181)/() 131/86     Weight: 69.9 kg (154 lb)  Body mass index is 26.43 kg/m².    Intake/Output Summary (Last 24 hours) at 2/5/2022 0920  Last data filed at 2/5/2022 0600  Gross per 24 hour   Intake --   Output 1290 ml   Net -1290 ml      Physical Exam  Vitals and nursing note reviewed.   Constitutional:       General: He is not in acute distress.     Appearance: Normal appearance. He is normal weight. He is ill-appearing. He is not toxic-appearing.   HENT:      Head: Normocephalic and atraumatic.      Right Ear: External ear normal.      Left Ear: External ear normal.      Nose: Nose normal. No congestion or rhinorrhea.      Mouth/Throat:      Mouth: Mucous membranes are moist.      Pharynx:  Oropharynx is clear. No oropharyngeal exudate or posterior oropharyngeal erythema.   Eyes:      Extraocular Movements: Extraocular movements intact.      Conjunctiva/sclera: Conjunctivae normal.   Cardiovascular:      Rate and Rhythm: Normal rate. Rhythm irregular.      Pulses:           Dorsalis pedis pulses are detected w/ Doppler on the left side.      Heart sounds: Normal heart sounds. No murmur heard.  No friction rub. No gallop.    Pulmonary:      Effort: Pulmonary effort is normal. No respiratory distress.      Breath sounds: Wheezing and rales present. No rhonchi.      Comments: Intermittent pursed lip breathing  Abdominal:      General: Abdomen is flat. Bowel sounds are normal. There is no distension.      Palpations: Abdomen is soft.      Tenderness: There is no abdominal tenderness. There is no guarding.   Musculoskeletal:         General: No swelling, tenderness or deformity. Normal range of motion.      Cervical back: Normal range of motion and neck supple.      Right lower leg: No edema.      Left lower leg: No edema.   Skin:     General: Skin is warm and dry.      Capillary Refill: Capillary refill takes less than 2 seconds.   Neurological:      General: No focal deficit present.      Mental Status: He is alert and oriented to person, place, and time. Mental status is at baseline.      GCS: GCS eye subscore is 4. GCS verbal subscore is 3. GCS motor subscore is 5.      Comments: Moves all extremities. Mental status precludes comprehensive neuro exam.    Psychiatric:         Speech: He is noncommunicative.         Behavior: Behavior is agitated.         Cognition and Memory: Cognition is impaired.         Significant Labs:   All pertinent labs within the past 24 hours have been reviewed.  Recent Lab Results       02/05/22  0347   02/04/22  2136   02/04/22  2128   02/04/22  1434        Appearance, CSF       Slightly hazy       Mono/Macrophage, CSF       23       Segmented Neutrophils, CSF       66        [place]       2.5       WBC, CSF       5       RBC, CSF       2000       Lymphs, CSF       11       Albumin 2.2             Alkaline Phosphatase 90             Allens Test   Pass           ALT 24             Anion Gap 8             AST 69             BILIRUBIN TOTAL 1.7  Comment: For infants and newborns, interpretation of results should be based  on gestational age, weight and in agreement with clinical  observations.    Premature Infant recommended reference ranges:  Up to 24 hours.............<8.0 mg/dL  Up to 48 hours............<12.0 mg/dL  3-5 days..................<15.0 mg/dL  6-29 days.................<15.0 mg/dL               Site   RR           BUN 52             Calcium 8.2             Chloride 114             CO2 23             COLOR CSF       Xanthochromic       Creatinine 1.2             CSF CULTURE       No Growth to date  [P]       DelSys   Nasal Can           eGFR if  >60.0             eGFR if non  53.7  Comment: Calculation used to obtain the estimated glomerular filtration  rate (eGFR) is the CKD-EPI equation.                Flow   11           Glucose 106             Glucose, CSF       68  Comment: Infants: 60 to 80 mg/dL       Gram Stain Result       Rare WBC's              No organisms seen       Hematocrit 38.8             Hemoglobin 12.3             Lactate, Romulo 1.8  Comment: Falsely low lactic acid results can be found in samples   containing >=13.0 mg/dL total bilirubin and/or >=3.5 mg/dL   direct bilirubin.       2.2  Comment: Falsely low lactic acid results can be found in samples   containing >=13.0 mg/dL total bilirubin and/or >=3.5 mg/dL   direct bilirubin.           Magnesium 2.2             MCH 25.1             MCHC 31.7             MCV 79             Mode   SPONT           MPV 11.6             Phosphorus 4.3             Platelet Estimate Appears normal             Platelets 153             POC BE   -2           POC HCO3   23.0           POC PCO2    37.1           POC PH   7.399           POC PO2   60           POC SATURATED O2   91           POC TCO2   24           Potassium 3.6             Protein, CSF       90  Comment: Infants can have higher CSF protein results due to increased  permeability of the blood-brain barrier.         PROTEIN TOTAL 4.8             RBC 4.91             RDW 16.2             Sample   ARTERIAL           Sodium 145             WBC 12.90                    [P] - Preliminary Result             Significant Imaging:  Imaging Results          US Lower Extremity Arteries Bilateral (Final result)  Result time 02/04/22 10:44:44    Final result by William Dempsey MD (02/04/22 10:44:44)                 Impression:      Patent lower extremity arterial system.  Sampled velocities within normal limits.    Electronically signed by resident: Magdiel Arora  Date:    02/04/2022  Time:    10:31    Electronically signed by: William Dempsey MD  Date:    02/04/2022  Time:    10:44             Narrative:    EXAMINATION:  US LOWER EXTREMITY ARTERIES BILATERAL    CLINICAL HISTORY:  Leg ischemia concern;    TECHNIQUE:  Bilateral lower extremity arterial duplex ultrasound examination performed. Multiple gray scale and color doppler images were obtained in addition to waveform analysis per COVID protocol.    COMPARISON:  None    FINDINGS:  The peak systolic velocities on the right are as follows, in centimeters/second:    Common femoral artery: 71    Superficial femoral artery, proximal: 60    Superficial femoral artery, mid portion: 50    Superficial femoral artery, distal: 55    Proximal popliteal artery: 34    Distal popliteal artery: 22    Anterior tibial artery: 28    Posterior tibial artery: 39    The peak systolic velocities on the left are as follows, in centimeters/second:    Common femoral artery: 49    Superficial femoral artery, proximal: 62    Superficial femoral artery, mid portion: 52    Superficial femoral artery, distal: 35    Proximal popliteal  "artery: 55    Distal popliteal artery: 56    Anterior tibial artery: 32, biphasic waveform    Posterior tibial artery: 62    Normal arterial waveforms are demonstrated except where noted above.                               X-Ray Chest AP Portable (Final result)  Result time 02/03/22 22:17:28    Final result by Himanshu Abreu MD (02/03/22 22:17:28)                 Impression:      Patchy bilateral airspace opacities suggestive of infectious/inflammatory process or pulmonary edema in the appropriate clinical setting.      Electronically signed by: Himanshu Abreu MD  Date:    02/03/2022  Time:    22:17             Narrative:    EXAMINATION:  XR CHEST AP PORTABLE    CLINICAL HISTORY:  Provided history is "  Altered mental status, unspecified".    TECHNIQUE:  One view of the chest.    COMPARISON:  04/27/2011.    FINDINGS:  Cardiac wires overlie the chest.  Cardiomediastinal silhouette is at the upper limits of normal in size.  Atherosclerotic calcifications overlie the aortic arch.  There are severe patchy bilateral airspace opacities, right side slightly worse than left.  No large pleural effusion.  No pneumothorax.                               CT Head Without Contrast (Final result)  Result time 02/03/22 21:42:35    Final result by Rodney Garland MD (02/03/22 21:42:35)                 Impression:      No acute intraparenchymal hemorrhage or major vascular distribution infarction.  Additional evaluation, as clinically warranted.    Generalized cerebral volume loss and chronic ischemic microvascular changes.    Electronically signed by resident: Gustavo Romano MD  Date:    02/03/2022  Time:    21:25    Electronically signed by: Rodney Garland MD  Date:    02/03/2022  Time:    21:42             Narrative:    EXAMINATION:  CT HEAD WITHOUT CONTRAST    CLINICAL HISTORY:  Mental status change, unknown cause;    TECHNIQUE:  Low dose axial CT images obtained throughout the head without intravenous contrast. Sagittal and " coronal reconstructions were performed.    COMPARISON:  None.    FINDINGS:  Intracranial compartment:    Generalized cerebral volume loss with compensatory enlargement of the ventricles and sulci.  No evidence of hydrocephalus.  No extra-axial blood or fluid collections.    Patchy periventricular white matter hypoattenuation which is nonspecific, however likely related to chronic ischemic microvascular changes.  No acute intraparenchymal hemorrhage or major vascular distribution infarction.    Skull/extracranial contents (limited evaluation): No fracture. Mastoid air cells and paranasal sinuses are essentially clear.  Patchy mucosal thickening of the paranasal sinuses and bilateral mastoid air cells.                                 I have reviewed all pertinent imaging results/findings within the past 24 hours.

## 2022-02-05 NOTE — PLAN OF CARE
[facility] ([place] [address])  Initial Discharge Assessment       Primary Care Provider: SHARRON Padilla MD    Admission Diagnosis: SOB (shortness of breath) [R06.02]  Chest pain [R07.9]  Multifocal pneumonia [J18.9]  AMS (altered mental status) [R41.82]  Sepsis, due to unspecified organism, unspecified whether acute organ dysfunction present [A41.9]  COVID-19 virus infection [U07.1]  Pneumonia due to COVID-19 virus [U07.1, J12.82]    Admission Date: 2/3/2022  Expected Discharge Date: 2/8/2022    Discharge Barriers Identified: None    Payor: MEDICARE / Plan: MEDICARE PART A & B / Product Type: Government /     Extended Emergency Contact Information  Primary Emergency Contact: Jamilah Samuel  Address: [address]           [place] [address] [facility]  Home Phone: [phone]  Mobile Phone: [phone]  Relation: Spouse    Discharge Plan A: Home  Discharge Plan B: Home with family,Home Health      [id number] - [place] - [address]  [address]  [place] [address]  Phone: [phone] Fax: [phone]    CM spoke with Nakita Garcia (daughter) [phone] via phone for Discharge Planning Assessment. Patient was unable to answer questions due to being confused. Per daughter Nakita, patient lives with Tiffanie Abbasi (daughter) [phone], Jamilah Chitrago (spouse) [phone] in a 3rd floor apartment with 30 steps to front door and 1 step to enter. This is a temporary location with jacob Moreno while patient is ill. Per jacob Mace, patient was independent with ADLs and used no DME for ambulation. Per Nakita, patient is not on dialysis and does not take Coumadin. Patient will have assistance from Tiffanie Abbasi (daughter) [phone], Jamilah Samuel (spouse) [phone], Nakita Yakima (daughter) [phone] and two other daughters upon discharge. Discharge Planning discussed with Nakita Garcia (daughter) [phone] via phone. All questions  addressed. CM to follow for needs.    Initial Assessment (most recent)     Adult Discharge Assessment - 02/04/22 1927        Discharge Assessment    Assessment Type Discharge Planning Assessment     Confirmed/corrected address, phone number and insurance Yes     Confirmed Demographics Correct on Facesheet     Source of Information family     When was your last doctors appointment? 04/29/21     Communicated TAMIA with patient/caregiver Date not available/Unable to determine     Reason For Admission Acute hypoxemic respiratory failure     Lives With spouse     Facility Arrived From: Home     Do you expect to return to your current living situation? Yes     Do you have help at home or someone to help you manage your care at home? Yes     Who are your caregiver(s) and their phone number(s)? Tiffanie Abbasi (daughter) [phone], Jamilah Samuel (spouse) [phone]     Prior to hospitilization cognitive status: Not Oriented to Place;Not Oriented to Person;Not Oriented to Time     Current cognitive status: Unable to Assess     Walking or Climbing Stairs Difficulty none     Dressing/Bathing Difficulty none     Equipment Currently Used at Home none     Readmission within 30 days? No     Patient currently being followed by outpatient case management? No     Do you currently have service(s) that help you manage your care at home? No     Do you take prescription medications? Yes     Do you have prescription coverage? Yes     Coverage Medicare - Medicare Part A & B     Do you have any problems affording any of your prescribed medications? No     Is the patient taking medications as prescribed? yes     Who is going to help you get home at discharge? Tiffanie Abbasi (daughter) [phone], Nakita Jose (daughter) [phone]     How do you get to doctors appointments? car, drives self     Are you on dialysis? No     Do you take coumadin? No     Discharge Plan A Home     Discharge Plan B Home with family;Home Health     DME  Needed Upon Discharge  other (see comments)   TBD    Discharge Plan discussed with: Adult children     Discharge Barriers Identified None        Relationship/Environment    Name(s) of Who Lives With Patient Tiffanie Abbasi (daughter) [phone], Jamilah Samuel (spouse) [phone]                        PCP:  SHARRON Padilla MD  [phone]        Pharmacy:    [id number] - [place] - [address]  [address]  [place] [address]  Phone: [phone] Fax: [phone]        Emergency Contacts:  Extended Emergency Contact Information  Primary Emergency Contact: Jamilah Samuel  Address: [address]           [place] [address] [facility]  Home Phone: [phone]  Mobile Phone: [phone]  Relation: Spouse      Insurance:    Payor: MEDICARE / Plan: MEDICARE PART A & B / Product Type: Government /     Terra Colindres RN     [phone]      02/04/2022  7:45 PM

## 2022-02-05 NOTE — ASSESSMENT & PLAN NOTE
--new onset; suspect secondary to acute illness  --continue full dose anticoagulation  --low threshold for initiation of BB if in RVR    TTE 2/4/22:  · The left ventricle is normal in size with severely decreased systolic function. The estimated ejection fraction is 20%.  · Normal right ventricular size with moderately to severely reduced right ventricular systolic function.  · Left ventricular diastolic dysfunction.  · Biatrial enlargement.  · The ascending aorta is mildly dilated.  · Mild mitral regurgitation.  · Mild tricuspid regurgitation.  · The estimated PA systolic pressure is 51 mmHg.  · Intermediate central venous pressure (8 mmHg).

## 2022-02-05 NOTE — ASSESSMENT & PLAN NOTE
--Covid 19 positive on 1/26. Patient is not vaccinated.   --continue dexamethasone, barictinib and remdesivir  --started on full dose anticoagulation; given mild to moderate level of respiratory support, concern for limb ischemia and new onset a fib, will continue for now   --currently requiring 11L HFNC; wean as tolerated, however anticipate that his FiO2 requirement will likely worsen given typical disease trajectory

## 2022-02-05 NOTE — ASSESSMENT & PLAN NOTE
-- ? Secondary to Covid?  --CT head neg for acute intracranial abnormality  --LP unremarkable  --TSH WNL  --MRI Brain pending  --Precedex gtt for acute agitation  --will eval for other causes of delirium

## 2022-02-05 NOTE — HOSPITAL COURSE
Admitted to MICU for AMS and acute hypoxic respiratory failure 2/2 COVID. CTH unremarkable. LP with no signs of infection. Procal negative. Placed on COVID PNA protocol. With Remdesivir, Dexamethasone, and Baricitinib. CT Chest with diffuse patchy ground glass and consolidation. On broad spectrum Vanc and Zosyn. MRI Brain for AMS pending.

## 2022-02-05 NOTE — PROGRESS NOTES
"[facility] ([place] [address])  Critical Care Medicine  Progress Note    Patient Name: Lalo Samuel  MRN: [id number]  Admission Date: 2/3/2022  Hospital Length of Stay: 2 days  Code Status: Full Code  Attending Provider: Clarissa Sparks MD  Primary Care Provider: SHARRON Padilla MD   Principal Problem: Acute hypoxemic respiratory failure    Subjective:     HPI:  Mr. Samuel is a 89 yo M w/ a history of HTN and HLD who presents today for SOB. History was obtained from patient's daughter and ER records due to patient being acutely encephalopathic. Daughter states patient tested COVID-19 (+) 1/26. At time of positive test, he endorsed cough, congestion, post sinus drip. Symptoms improved until approximately 3 days prior when he began having increased SOB and fatigue. Family also reports symptoms consistent with hypoactive delirium (non-verbal, "blank" stare), as well as decreased oral intake and a few episodes of diarrhea. Patient's family called EMS twice but patient refused. Today, patient became acutely confused and agitated with labored breathing which prompted family to call EMS again. Of note patient is not vaccinated against COVID-19. He lives with his wife (who is vaccinated) and daughter who is not vaccinated. At baseline, he carries out ADLs independently.      In the ED, patient tachycardic, tachypneic but afebrile. Initially hypoxic w/ improved SpO2 94% on 5L NC. On examination, patient ill appearing, extremely agitated, and w/ increased work of breathing - using accessory muscles and intermittent pursed lip breathing. Labs notable for increased inflammatory markers - ferritin 877, , . Lactate 3.9. No leukocytosis, procal wnl. COVID-19 (+). CXR w/ significant patchy b/l airspace opacities concerning for post viral PNA vs pulmonary edema. CTH (-). Trop 0.075. EKG showing Afib w/ RVR. Received 1 x IV metoprolol 5mg w/ improvement of HR to ~110. Patient received 1.5L IVFs in " the ED, broad spectrum abx w/ vanc and zosyn and 1 x IV dexamethasone 6mg. He was admitted to hospital medicine for further management; however, MICU consulted for uptrending lactate and acute hypoxemic respiratory failure.    MICU Consulted for Acute Hypoxemic Respiratory Failure in the setting of COVID-19        Hospital/ICU Course:  Admitted to MICU for AMS and acute hypoxic respiratory failure 2/2 COVID. CTH unremarkable. LP with no signs of infection. Procal negative. Placed on COVID PNA protocol. With Remdesivir, Dexamethasone, and Baricitinib. CT Chest with diffuse patchy ground glass and consolidation. On broad spectrum Vanc and Zosyn. MRI Brain for AMS pending.    Interval History/Significant Events: NAEO. AF VSS. On 11L HFNC, stable oxygenation. Pending MRI Brain for AMS. Will trial precedex gtt for acute agitation while obtaining MRI. Undergoing COVID treatment protocol.    Review of Systems   Unable to perform ROS: Mental status change   Constitutional: Positive for appetite change. Negative for chills, fatigue and fever.   HENT: Negative for congestion, rhinorrhea and sore throat.    Respiratory: Positive for shortness of breath. Negative for chest tightness.    Cardiovascular: Negative for chest pain.   Gastrointestinal: Negative for abdominal pain, constipation, diarrhea, nausea and vomiting.   Genitourinary: Negative for dysuria, frequency and urgency.   Musculoskeletal: Negative for arthralgias and neck pain.   Neurological: Negative for dizziness, weakness, numbness and headaches.   Psychiatric/Behavioral: Positive for confusion and dysphoric mood.     Objective:     Vital Signs (Most Recent):  Temp: 97 °F (36.1 °C) (02/05/22 0300)  Pulse: (!) 119 (02/05/22 0823)  Resp: 20 (02/05/22 0831)  BP: 131/86 (02/05/22 0630)  SpO2: 96 % (02/05/22 0823) Vital Signs (24h Range):  Temp:  [97 °F (36.1 °C)-97.5 °F (36.4 °C)] 97 °F (36.1 °C)  Pulse:  [] 119  Resp:  [20-30] 20  SpO2:  [79 %-100 %] 96 %  BP:  (108-181)/() 131/86   Weight: 69.9 kg (154 lb)  Body mass index is 26.43 kg/m².      Intake/Output Summary (Last 24 hours) at 2/5/2022 0926  Last data filed at 2/5/2022 0600  Gross per 24 hour   Intake --   Output 1290 ml   Net -1290 ml       Physical Exam  Vitals and nursing note reviewed.   Constitutional:       General: He is not in acute distress.     Appearance: Normal appearance. He is normal weight. He is ill-appearing. He is not toxic-appearing.   HENT:      Head: Normocephalic and atraumatic.      Right Ear: External ear normal.      Left Ear: External ear normal.      Nose: Nose normal. No congestion or rhinorrhea.      Mouth/Throat:      Mouth: Mucous membranes are moist.      Pharynx: Oropharynx is clear. No oropharyngeal exudate or posterior oropharyngeal erythema.   Eyes:      Extraocular Movements: Extraocular movements intact.      Conjunctiva/sclera: Conjunctivae normal.   Cardiovascular:      Rate and Rhythm: Normal rate. Rhythm irregular.      Pulses:           Dorsalis pedis pulses are detected w/ Doppler on the left side.      Heart sounds: Normal heart sounds. No murmur heard.  No friction rub. No gallop.    Pulmonary:      Effort: Pulmonary effort is normal. No respiratory distress.      Breath sounds: Wheezing and rales present. No rhonchi.      Comments: Intermittent pursed lip breathing  Abdominal:      General: Abdomen is flat. Bowel sounds are normal. There is no distension.      Palpations: Abdomen is soft.      Tenderness: There is no abdominal tenderness. There is no guarding.   Musculoskeletal:         General: No swelling, tenderness or deformity. Normal range of motion.      Cervical back: Normal range of motion and neck supple.      Right lower leg: No edema.      Left lower leg: No edema.   Skin:     General: Skin is warm and dry.      Capillary Refill: Capillary refill takes less than 2 seconds.   Neurological:      General: No focal deficit present.      Mental Status: He  is alert and oriented to person, place, and time. Mental status is at baseline.      GCS: GCS eye subscore is 4. GCS verbal subscore is 3. GCS motor subscore is 5.      Comments: Moves all extremities. Mental status precludes comprehensive neuro exam.    Psychiatric:         Speech: He is noncommunicative.         Behavior: Behavior is agitated.         Cognition and Memory: Cognition is impaired.         Vents:     Lines/Drains/Airways     Drain                 Urethral Catheter 02/04/22 0504 Straight-tip 18 Fr. 1 day          Peripheral Intravenous Line                 Peripheral IV - Single Lumen 02/03/22 2121 20 G Right Antecubital 1 day         Peripheral IV - Single Lumen 02/03/22 2145 20 G Left Hand 1 day              Significant Labs:    CBC/Anemia Profile:  Recent Labs   Lab 02/03/22 2053 02/03/22 2122 02/05/22 0347   WBC 11.71  --  12.90*   HGB 14.0  --  12.3*   HCT 45.5  --  38.8*     --  153   MCV 81*  --  79*   RDW 16.6*  --  16.2*   FERRITIN  --  877*  --         Chemistries:  Recent Labs   Lab 02/03/22 2053 02/05/22 0347    145   K 4.2 3.6    114*   CO2 23 23   BUN 39* 52*   CREATININE 1.0 1.2   CALCIUM 9.2 8.2*   ALBUMIN 2.6* 2.2*   PROT 6.1 4.8*   BILITOT 1.9* 1.7*   ALKPHOS 92 90   ALT 21 24   AST 45* 69*   MG  --  2.2   PHOS  --  4.3       All pertinent labs within the past 24 hours have been reviewed.    Significant Imaging:  Imaging Results          US Lower Extremity Arteries Bilateral (Final result)  Result time 02/04/22 10:44:44    Final result by William Dempsey MD (02/04/22 10:44:44)                 Impression:      Patent lower extremity arterial system.  Sampled velocities within normal limits.    Electronically signed by resident: Magdiel Arora  Date:    02/04/2022  Time:    10:31    Electronically signed by: William Dempsey MD  Date:    02/04/2022  Time:    10:44             Narrative:    EXAMINATION:  US LOWER EXTREMITY ARTERIES BILATERAL    CLINICAL HISTORY:  Leg  "ischemia concern;    TECHNIQUE:  Bilateral lower extremity arterial duplex ultrasound examination performed. Multiple gray scale and color doppler images were obtained in addition to waveform analysis per COVID protocol.    COMPARISON:  None    FINDINGS:  The peak systolic velocities on the right are as follows, in centimeters/second:    Common femoral artery: 71    Superficial femoral artery, proximal: 60    Superficial femoral artery, mid portion: 50    Superficial femoral artery, distal: 55    Proximal popliteal artery: 34    Distal popliteal artery: 22    Anterior tibial artery: 28    Posterior tibial artery: 39    The peak systolic velocities on the left are as follows, in centimeters/second:    Common femoral artery: 49    Superficial femoral artery, proximal: 62    Superficial femoral artery, mid portion: 52    Superficial femoral artery, distal: 35    Proximal popliteal artery: 55    Distal popliteal artery: 56    Anterior tibial artery: 32, biphasic waveform    Posterior tibial artery: 62    Normal arterial waveforms are demonstrated except where noted above.                               X-Ray Chest AP Portable (Final result)  Result time 02/03/22 22:17:28    Final result by Himanshu Abreu MD (02/03/22 22:17:28)                 Impression:      Patchy bilateral airspace opacities suggestive of infectious/inflammatory process or pulmonary edema in the appropriate clinical setting.      Electronically signed by: Himanshu Abreu MD  Date:    02/03/2022  Time:    22:17             Narrative:    EXAMINATION:  XR CHEST AP PORTABLE    CLINICAL HISTORY:  Provided history is "  Altered mental status, unspecified".    TECHNIQUE:  One view of the chest.    COMPARISON:  04/27/2011.    FINDINGS:  Cardiac wires overlie the chest.  Cardiomediastinal silhouette is at the upper limits of normal in size.  Atherosclerotic calcifications overlie the aortic arch.  There are severe patchy bilateral airspace opacities, right " side slightly worse than left.  No large pleural effusion.  No pneumothorax.                               CT Head Without Contrast (Final result)  Result time 02/03/22 21:42:35    Final result by Rodney Garland MD (02/03/22 21:42:35)                 Impression:      No acute intraparenchymal hemorrhage or major vascular distribution infarction.  Additional evaluation, as clinically warranted.    Generalized cerebral volume loss and chronic ischemic microvascular changes.    Electronically signed by resident: Gustavo Romano MD  Date:    02/03/2022  Time:    21:25    Electronically signed by: Rodney Garland MD  Date:    02/03/2022  Time:    21:42             Narrative:    EXAMINATION:  CT HEAD WITHOUT CONTRAST    CLINICAL HISTORY:  Mental status change, unknown cause;    TECHNIQUE:  Low dose axial CT images obtained throughout the head without intravenous contrast. Sagittal and coronal reconstructions were performed.    COMPARISON:  None.    FINDINGS:  Intracranial compartment:    Generalized cerebral volume loss with compensatory enlargement of the ventricles and sulci.  No evidence of hydrocephalus.  No extra-axial blood or fluid collections.    Patchy periventricular white matter hypoattenuation which is nonspecific, however likely related to chronic ischemic microvascular changes.  No acute intraparenchymal hemorrhage or major vascular distribution infarction.    Skull/extracranial contents (limited evaluation): No fracture. Mastoid air cells and paranasal sinuses are essentially clear.  Patchy mucosal thickening of the paranasal sinuses and bilateral mastoid air cells.                                I have reviewed all pertinent imaging results/findings within the past 24 hours.      [facility]  Recent Labs   Lab 02/04/22  2136   PH 7.399   PO2 60*   PCO2 37.1   HCO3 23.0*   BE -2     Assessment/Plan:     Neuro  Acute metabolic encephalopathy  -- ? Secondary to Covid?  --CT head neg for acute intracranial abnormality  --LP  unremarkable  --TSH WNL  --MRI Brain pending  --Precedex gtt for acute agitation  --will eval for other causes of delirium    Pulmonary  * Acute hypoxemic respiratory failure  Patient with Hypoxic Respiratory failure which is Acute.  he is not on home oxygen. Supplemental oxygen was provided and noted-  .   Signs/symptoms of respiratory failure include- tachypnea and increased work of breathing. Contributing diagnoses includes - CHF, Pneumonia and Covid-19 Labs and images were reviewed. Patient Has recent ABG, which has been reviewed. Will treat underlying causes and adjust management of respiratory failure as follows-     --Likely multifactorial in setting of Covid-19 infection and suspected HF (elevated BNP with reduced LVEF on bedside echo)  --Continue tx for Covid (see above); Add Baricitinib (2/5)  --x1 dose lasix on 2/4 with good UOP  --lower suspicion for superimposed bacterial infection (no leukocytosis and neg procal), however continue broad spectrum antibiotics for now & de-escalate as clinically appropriate  --f/u blood and sputum cx  --currently requiring 11L HFNC; wean as tolerated    TTE 2/4/22:  · The left ventricle is normal in size with severely decreased systolic function. The estimated ejection fraction is 20%.  · Normal right ventricular size with moderately to severely reduced right ventricular systolic function.  · Left ventricular diastolic dysfunction.  · Biatrial enlargement.  · The ascending aorta is mildly dilated.  · Mild mitral regurgitation.  · Mild tricuspid regurgitation.  · The estimated PA systolic pressure is 51 mmHg.  · Intermediate central venous pressure (8 mmHg).        Cardiac/Vascular  A-fib  --new onset; suspect secondary to acute illness  --continue full dose anticoagulation  --low threshold for initiation of BB if in RVR    TTE 2/4/22:  · The left ventricle is normal in size with severely decreased systolic function. The estimated ejection fraction is 20%.  · Normal right  ventricular size with moderately to severely reduced right ventricular systolic function.  · Left ventricular diastolic dysfunction.  · Biatrial enlargement.  · The ascending aorta is mildly dilated.  · Mild mitral regurgitation.  · Mild tricuspid regurgitation.  · The estimated PA systolic pressure is 51 mmHg.  · Intermediate central venous pressure (8 mmHg).        HTN (hypertension), benign  --Home amlodipine currently on hold  --Consider starting BB for rate control if pt develops RVR given new onset a fib; would also benefit from GDMT given reduced EF  --given elevated BNP and cxr concerning for pulm edema, given lasix x1 dose with good UOP    Endocrine  Hypothyroidism  --TSH 3.057 (2/4)  --continue home synthroid    Other  Elevated lactic acid level  --no obvious source of infection; no leukocytosis and neg procal  --will continue broad spectrum antibiotics for now  --follow up blood, CSF and sputum cx  --concern for possible RLE ischemia (extremity cool to touch and unable to doppler pulse) -> LE arterial US negative  --Lactate downtrending    Pneumonia due to COVID-19 virus  --Covid 19 positive on 1/26. Patient is not vaccinated.   --continue dexamethasone, barictinib and remdesivir  --started on full dose anticoagulation; given mild to moderate level of respiratory support, concern for limb ischemia and new onset a fib, will continue for now   --currently requiring 11L HFNC; wean as tolerated, however anticipate that his FiO2 requirement will likely worsen given typical disease trajectory         Critical Care Daily Checklist:    A: Awake: RASS Goal/Actual Goal:    Actual: Martin Agitation Sedation Scale (RASS): Drowsy   B: Spontaneous Breathing Trial Performed?     C: SAT & SBT Coordinated?  N/a                      D: Delirium: CAM-ICU Overall CAM-ICU: Positive   E: Early Mobility Performed? No   F: Feeding Goal:    Status:     Current Diet Order   Procedures    Diet NPO      AS: Analgesia/Sedation  Precedex gtt   T: Thromboembolic Prophylaxis Lovenox   H: HOB > 300 Yes   U: Stress Ulcer Prophylaxis (if needed) N/a   G: Glucose Control N/a   B: Bowel Function     I: Indwelling Catheter (Lines & Valenzuela) Necessity Valenzuela   D: De-escalation of Antimicrobials/Pharmacotherapies Gopal Daily; will likely deescalate in coming days    Plan for the day/ETD MRI Brain, start Baricitinib, wean O2 as tolerated    Code Status:  Family/Goals of Care: Full Code  Full COVID treatment protocol       Critical secondary to Patient has a condition that poses threat to life and bodily function: Severe Respiratory Distress and Encephalopathy      Critical care was time spent personally by me on the following activities: development of treatment plan with patient or surrogate and bedside caregivers, discussions with consultants, evaluation of patient's response to treatment, examination of patient, ordering and performing treatments and interventions, ordering and review of laboratory studies, ordering and review of radiographic studies, pulse oximetry, re-evaluation of patient's condition. This critical care time did not overlap with that of any other provider or involve time for any procedures.     James Gregg MD  Critical Care Medicine  [facility] - Intensive Care ([place] [address])

## 2022-02-05 NOTE — ASSESSMENT & PLAN NOTE
--TSH 3.057 (2/4)  --continue home synthroid   Dr Rudolph see message listed below. I placed UA order in chart please sign off.   If additional orders is needed per patient message listed below. Please add on. Thank you!      Spoke with Simon  selina Reza at home. Simon is requesting our office to place Urinalysis/ Culture order. She will have Nurse collect urine sample in urine specimen cup at patient home. Then have ACL Lab will  urine specimen from patient home then drop off urine sample at one of our ACL lab.

## 2022-02-05 NOTE — ASSESSMENT & PLAN NOTE
Patient with Hypoxic Respiratory failure which is Acute.  he is not on home oxygen. Supplemental oxygen was provided and noted-  .   Signs/symptoms of respiratory failure include- tachypnea and increased work of breathing. Contributing diagnoses includes - CHF, Pneumonia and Covid-19 Labs and images were reviewed. Patient Has recent ABG, which has been reviewed. Will treat underlying causes and adjust management of respiratory failure as follows-     --Likely multifactorial in setting of Covid-19 infection and suspected HF (elevated BNP with reduced LVEF on bedside echo)  --Continue tx for Covid (see above); Add Baricitinib (2/5)  --x1 dose lasix on 2/4 with good UOP  --lower suspicion for superimposed bacterial infection (no leukocytosis and neg procal), however continue broad spectrum antibiotics for now & de-escalate as clinically appropriate  --f/u blood and sputum cx  --currently requiring 11L HFNC; wean as tolerated    TTE 2/4/22:  · The left ventricle is normal in size with severely decreased systolic function. The estimated ejection fraction is 20%.  · Normal right ventricular size with moderately to severely reduced right ventricular systolic function.  · Left ventricular diastolic dysfunction.  · Biatrial enlargement.  · The ascending aorta is mildly dilated.  · Mild mitral regurgitation.  · Mild tricuspid regurgitation.  · The estimated PA systolic pressure is 51 mmHg.  · Intermediate central venous pressure (8 mmHg).

## 2022-02-05 NOTE — PLAN OF CARE
Problem: Infection  Goal: Absence of Infection Signs and Symptoms  Outcome: Ongoing, Progressing     Problem: Adult Inpatient Plan of Care  Goal: Plan of Care Review  Outcome: Ongoing, Progressing  Goal: Patient-Specific Goal (Individualized)  Outcome: Ongoing, Progressing  Goal: Absence of Hospital-Acquired Illness or Injury  Outcome: Ongoing, Progressing  Goal: Optimal Comfort and Wellbeing  Outcome: Ongoing, Progressing  Goal: Readiness for Transition of Care  Outcome: Ongoing, Progressing     Problem: Fall Injury Risk  Goal: Absence of Fall and Fall-Related Injury  Outcome: Ongoing, Progressing     Problem: Restraint, Nonbehavioral (Nonviolent)  Goal: Absence of Harm or Injury  Outcome: Ongoing, Progressing     Problem: Skin Injury Risk Increased  Goal: Skin Health and Integrity  Outcome: Ongoing, Progressing     CMICU DAILY GOALS       A: Awake    RASS: Goal -    Actual -     Restraint necessity: Clinical Justification: Removing medical devices,Treatment Interference  B: Breathe   SBT: Not intubated   C: Coordinate A & B, analgesics/sedatives   Pain: managed    SAT: Not intubated  D: Delirium   CAM-ICU: Overall CAM-ICU: Positive  E: Early(intubated/ Progressive (non-intubated) Mobility   MOVE Screen: Fail   Activity: Activity Management: Rolling - L1  FAS: Feeding/Nutrition   Diet order: Diet/Nutrition Received: NPO,    T: Thrombus   DVT prophylaxis: VTE Required Core Measure: Pharmacological prophylaxis initiated/maintained  H: HOB Elevation   Head of Bed (HOB) Positioning: HOB at 30-45 degrees  U: Ulcer Prophylaxis   GI: yes  G: Glucose control   managed    S: Skin      Device Skin Pressure Protection: absorbent pad utilized/changed,pressure points protected  Pressure Reduction Devices: foam padding utilized,specialty bed utilized  Pressure Reduction Techniques: frequent weight shift encouraged,weight shift assistance provided  Skin Protection: drying agents applied,hydrocolloids used,incontinence pads  utilized,tubing/devices free from skin contact  B: Bowel Function   no issues   I: Indwelling Catheters   Valenzuela necessity:      Urethral Catheter 02/04/22 0504 Straight-tip 18 Fr.-Reason for Continuing Urinary Catheterization: Critically ill in ICU and requiring hourly monitoring of intake/output   CVC necessity: No  D: De-escalation Antibiotics   Yes    Family/Goals of care/Code Status   Code Status: Full Code    24H Vital Sign Range  Temp:  [97.2 °F (36.2 °C)-98 °F (36.7 °C)]   Pulse:  []   Resp:  [20-34]   BP: ()/()   SpO2:  [88 %-100 %]      Shift Events   Patient had lumbar puncture procedure. Samples were sent off. Patient also went for full body CT scan. MRI is ordered, but MRI did not call with any availability. Patient oxygen has been titrated as tolerated throughout the day. Patient not on any drips.    VS and assessment per flow sheet, patient progressing towards goals as tolerated, plan of care reviewed with [unfilled] and family, all concerns addressed, will continue to monitor.    Shoshana Kapadia

## 2022-02-05 NOTE — ASSESSMENT & PLAN NOTE
--no obvious source of infection; no leukocytosis and neg procal  --will continue broad spectrum antibiotics for now  --follow up blood, CSF and sputum cx  --concern for possible RLE ischemia (extremity cool to touch and unable to doppler pulse) -> LE arterial US negative  --Lactate downtrending

## 2022-02-06 PROBLEM — N17.9 AKI (ACUTE KIDNEY INJURY): Status: ACTIVE | Noted: 2022-01-01

## 2022-02-06 PROBLEM — N17.9 AKI (ACUTE KIDNEY INJURY): Status: RESOLVED | Noted: 2022-01-01 | Resolved: 2022-01-01

## 2022-02-06 NOTE — PROGRESS NOTES
"[facility] ([place] [id number])  Critical Care Medicine  Progress Note    Patient Name: Lalo Samuel  MRN: [id number]  Admission Date: 2/3/2022  Hospital Length of Stay: 3 days  Code Status: Full Code  Attending Provider: Clarissa Sparks MD  Primary Care Provider: SHARRON Padilla MD   Principal Problem: Acute hypoxemic respiratory failure    Subjective:     HPI:  Mr. Samuel is a 87 yo M w/ a history of HTN and HLD who presents today for SOB. History was obtained from patient's daughter and ER records due to patient being acutely encephalopathic. Daughter states patient tested COVID-19 (+) 1/26. At time of positive test, he endorsed cough, congestion, post sinus drip. Symptoms improved until approximately 3 days prior when he began having increased SOB and fatigue. Family also reports symptoms consistent with hypoactive delirium (non-verbal, "blank" stare), as well as decreased oral intake and a few episodes of diarrhea. Patient's family called EMS twice but patient refused. Today, patient became acutely confused and agitated with labored breathing which prompted family to call EMS again. Of note patient is not vaccinated against COVID-19. He lives with his wife (who is vaccinated) and daughter who is not vaccinated. At baseline, he carries out ADLs independently.      In the ED, patient tachycardic, tachypneic but afebrile. Initially hypoxic w/ improved SpO2 94% on 5L NC. On examination, patient ill appearing, extremely agitated, and w/ increased work of breathing - using accessory muscles and intermittent pursed lip breathing. Labs notable for increased inflammatory markers - ferritin 877, , . Lactate 3.9. No leukocytosis, procal wnl. COVID-19 (+). CXR w/ significant patchy b/l airspace opacities concerning for post viral PNA vs pulmonary edema. CTH (-). Trop 0.075. EKG showing Afib w/ RVR. Received 1 x IV metoprolol 5mg w/ improvement of HR to ~110. Patient received 1.5L IVFs in " the ED, broad spectrum abx w/ vanc and zosyn and 1 x IV dexamethasone 6mg. He was admitted to hospital medicine for further management; however, MICU consulted for uptrending lactate and acute hypoxemic respiratory failure.    MICU Consulted for Acute Hypoxemic Respiratory Failure in the setting of COVID-19          Hospital/ICU Course:  Admitted to MICU for AMS and acute hypoxic respiratory failure 2/2 COVID. CTH unremarkable. LP with no signs of infection. Procal negative. Placed on COVID PNA protocol. With Remdesivir, Dexamethasone, and Baricitinib. CT Chest with diffuse patchy ground glass and consolidation. On broad spectrum Vanc and Zosyn. MRI Brain showing multiple acute embolic infarcts, vascular neuro consulted recommending CTA head and neck, however, patient has OSWALDO (likely 2/2 to contrast) and increasing oxygen requirements so holding off for now. Plan for palliative consult, echo w/bubble.       Interval History/Significant Events: Patient has had increasing oxygen requirements, now on 15 L high flow. Continues to not respond to questioning. MRI 2/5 showing embolic infarcts. Continues to be afebrile, however will have episodes of hypertension as well as tachycardia.     Review of Systems   Unable to perform ROS: Mental status change   Respiratory: Positive for shortness of breath.    Psychiatric/Behavioral: Positive for confusion.     Objective:     Vital Signs (Most Recent):  Temp: 98.2 °F (36.8 °C) (02/06/22 0800)  Pulse: (!) 121 (02/06/22 0821)  Resp: (!) 29 (02/06/22 0821)  BP: (!) 168/105 (02/06/22 0800)  SpO2: (!) 92 % (02/06/22 0821) Vital Signs (24h Range):  Temp:  [96.7 °F (35.9 °C)-98.5 °F (36.9 °C)] 98.2 °F (36.8 °C)  Pulse:  [] 121  Resp:  [14-44] 29  SpO2:  [88 %-98 %] 92 %  BP: ()/() 168/105   Weight: 69.9 kg (154 lb)  Body mass index is 26.43 kg/m².      Intake/Output Summary (Last 24 hours) at 2/6/2022 1020  Last data filed at 2/6/2022 0600  Gross per 24 hour   Intake  1127.58 ml   Output 650 ml   Net 477.58 ml       Physical Exam  Vitals and nursing note reviewed.   Constitutional:       Appearance: He is normal weight. He is ill-appearing. He is not toxic-appearing.   HENT:      Head: Normocephalic and atraumatic.      Nose: Nose normal.   Eyes:      General: No scleral icterus.     Conjunctiva/sclera: Conjunctivae normal.   Cardiovascular:      Rate and Rhythm: Tachycardia present. Rhythm irregular.   Pulmonary:      Comments: Intermittent pursed lip breathing  Abdominal:      General: Abdomen is flat. There is no distension.      Palpations: Abdomen is soft.      Tenderness: There is no abdominal tenderness. There is no guarding.   Musculoskeletal:         General: No swelling, tenderness or deformity. Normal range of motion.      Cervical back: Normal range of motion.      Right lower leg: No edema.      Left lower leg: No edema.   Skin:     General: Skin is warm and dry.      Capillary Refill: Capillary refill takes less than 2 seconds.   Neurological:      General: No focal deficit present.      Mental Status: He is alert and oriented to person, place, and time. Mental status is at baseline.      GCS: GCS eye subscore is 4. GCS verbal subscore is 3. GCS motor subscore is 5.      Comments: Moves all extremities. Mental status precludes comprehensive neuro exam.    Psychiatric:         Speech: He is noncommunicative.         Behavior: Behavior is agitated.         Cognition and Memory: Cognition is impaired.         Vents:     Lines/Drains/Airways     Drain                 Urethral Catheter 02/04/22 0504 Straight-tip 18 Fr. 2 days          Peripheral Intravenous Line                 Peripheral IV - Single Lumen 02/03/22 2121 20 G Right Antecubital 2 days         Peripheral IV - Single Lumen 02/05/22 2230 20 G Left Antecubital <1 day              Significant Labs:    CBC/Anemia Profile:  Recent Labs   Lab 02/05/22  0347 02/06/22  0400   WBC 12.90* 17.81*   HGB 12.3* 13.8*    HCT 38.8* 44.7    163   MCV 79* 80*   RDW 16.2* 17.5*        Chemistries:  Recent Labs   Lab 02/05/22  0347 02/05/22  2138 02/06/22  0400    149* 150*   K 3.6 3.9 3.9   * 113* 115*   CO2 23 21* 21*   BUN 52* 60* 69*   CREATININE 1.2 1.4 1.6*   CALCIUM 8.2* 8.6* 8.6*   ALBUMIN 2.2*  --  2.4*   PROT 4.8*  --  5.9*   BILITOT 1.7*  --  1.9*   ALKPHOS 90  --  150*   ALT 24  --  28   AST 69*  --  81*   MG 2.2 2.4 2.4   PHOS 4.3  --  4.1     Significant Imaging:  I have reviewed all pertinent imaging results/findings within the past 24 hours.      ABG  Recent Labs   Lab 02/04/22 2136   PH 7.399   PO2 60*   PCO2 37.1   HCO3 23.0*   BE -2     Assessment/Plan:     Neuro  Acute arterial ischemic stroke, multifocal, multiple vascular territories  Patient with new onset a-fib, covid positive, acutely encephalopathic. MRI 2/5 revealed findings suggestive of multifocal acute embolic infarcts, as well as a small acute infarction in the right inferior cerebellum. Vascular neuro consulted, recommended CTA head and neck. However, patient has had increasing oxygen requirements as well as developed an OSWALDO. Spoke with vascular neuro and will hold on CTA for now.     -BP <220  - continue full AC, switched from lovenox to heparin gtt with worsening kidney function       Acute metabolic encephalopathy    --CT head neg for acute intracranial abnormality  --LP unremarkable  --TSH WNL  --MRI showing acute embolic infarcts, vascular neuro consulted, recommended CTA however oxygen requirement increasig and OSWALDO, so holding for now. Spoke with vascular neuro.   --Precedex gtt for acute agitation (max of 0.4)  --will eval for other causes of delirium    Pulmonary  * Acute hypoxemic respiratory failure  Patient with Hypoxic Respiratory failure which is Acute.  he is not on home oxygen. Supplemental oxygen was provided and noted-  .   Signs/symptoms of respiratory failure include- tachypnea and increased work of breathing.  Contributing diagnoses includes - CHF, Pneumonia and Covid-19 Labs and images were reviewed. Patient Has recent ABG, which has been reviewed. Will treat underlying causes and adjust management of respiratory failure as follows-     --Likely multifactorial in setting of Covid-19 infection and suspected HF (elevated BNP with reduced LVEF on bedside echo)  --Continue tx for Covid (see above); Add Baricitinib (2/5)  --x1 dose lasix on 2/4 with good UOP  --lower suspicion for superimposed bacterial infection (no leukocytosis and neg procal), however was on broad spectrum antibiotics vanc and zosyn. Given worsening kidney infection and low suspicion of bacterial infection without positive BC will DC vanc.   --f/u blood and sputum cx  --currently requiring 15L HFNC; wean as tolerated    TTE 2/4/22:  · The left ventricle is normal in size with severely decreased systolic function. The estimated ejection fraction is 20%.  · Normal right ventricular size with moderately to severely reduced right ventricular systolic function.  · Left ventricular diastolic dysfunction.  · Biatrial enlargement.  · The ascending aorta is mildly dilated.  · Mild mitral regurgitation.  · Mild tricuspid regurgitation.  · The estimated PA systolic pressure is 51 mmHg.  · Intermediate central venous pressure (8 mmHg).        Cardiac/Vascular  A-fib  --new onset; suspect secondary to acute illness  --continue full dose anticoagulation  --low threshold for initiation of BB if in RVR    TTE 2/4/22:  · The left ventricle is normal in size with severely decreased systolic function. The estimated ejection fraction is 20%.  · Normal right ventricular size with moderately to severely reduced right ventricular systolic function.  · Left ventricular diastolic dysfunction.  · Biatrial enlargement.  · The ascending aorta is mildly dilated.  · Mild mitral regurgitation.  · Mild tricuspid regurgitation.  · The estimated PA systolic pressure is 51  mmHg.  · Intermediate central venous pressure (8 mmHg).        HTN (hypertension), benign  --Home amlodipine currently on hold  --Consider starting BB for rate control if pt develops RVR given new onset a fib; would also benefit from GDMT given reduced EF  --given elevated BNP and cxr concerning for pulm edema, recieved lasix x1 dose    Renal/  OSWALDO (acute kidney injury)  Patient Cr increasing, from 1/0 to 1.6 now. Likely contrast induced.    - avoid nephrotoxic agents  -renally dose medications  - Holding CTA head and neck  - DC vanc  - switched therapeutic lovenox to heparin gtt given worsening kidney function    Endocrine  Hypothyroidism  --TSH 3.057 (2/4)  --Holding home synthroid as is NPO    Other  Elevated lactic acid level  --no obvious source of infection; no leukocytosis and neg procal  --DC vanc, continuing zosyn for now  --follow up blood (NGTD), CSF (NGTD) and sputum cx  --concern for possible RLE ischemia (extremity cool to touch and unable to doppler pulse) -> LE arterial US negative  --Lactate downtrending    Pneumonia due to COVID-19 virus  --Covid 19 positive on 1/26. Patient is not vaccinated.   --continue dexamethasone, barictinib and remdesivir  --started on full dose anticoagulation; given mild to moderate level of respiratory support, concern for limb ischemia and new onset a fib, will continue for now. Given OSWALDO switched from therapeutic lovenox to Heparin  --currently requiring 15L HFNC; wean as tolerated, however anticipate that his FiO2 requirement will likely worsen given typical disease trajectory         Critical Care Daily Checklist:    A: Awake: RASS Goal/Actual Goal:    Actual: Martin Agitation Sedation Scale (RASS): Agitated   B: Spontaneous Breathing Trial Performed?     C: SAT & SBT Coordinated?  n/a                      D: Delirium: CAM-ICU Overall CAM-ICU: Positive   E: Early Mobility Performed? No   F: Feeding Goal:    Status:     Current Diet Order   Procedures    Diet NPO       AS: Analgesia/Sedation precedex   T: Thromboembolic Prophylaxis Heparin gtt   H: HOB > 300 Yes   U: Stress Ulcer Prophylaxis (if needed) n/a   G: Glucose Control no   B: Bowel Function     I: Indwelling Catheter (Lines & Valenzuela) Necessity Valenzuela, PIV   D: De-escalation of Antimicrobials/Pharmacotherapies DC vanc    Plan for the day/ETD Wean O2 as tolerated, family discussion    Code Status:  Family/Goals of Care: Full Code         Critical secondary to Patient has a condition that poses threat to life and bodily function: Severe Respiratory Distress and embolic strokes.       Critical care was time spent personally by me on the following activities: development of treatment plan with patient or surrogate and bedside caregivers, discussions with consultants, evaluation of patient's response to treatment, examination of patient, ordering and performing treatments and interventions, ordering and review of laboratory studies, ordering and review of radiographic studies, pulse oximetry, re-evaluation of patient's condition. This critical care time did not overlap with that of any other provider or involve time for any procedures.     Kumar Marks, DO  Critical Care Medicine  [facility] ([place] [address])

## 2022-02-06 NOTE — HOSPITAL COURSE
2/6/2022-R hemiparesis, moving left side spontaneously.   2/8/22: Patient on Precedex and actively moaning while in restraints. Moves L side spontaneously, no movement on R. Recommending vessel imaging.

## 2022-02-06 NOTE — HPI
Patient is an 89 yo M with PMHx of HTN, Afib, COVID and HLD who was admitted for respiratory failure. History on admission obtained per chart review and family as patient encephalopathic. On day of admission, patient with ferritin 877, , ,lactate 3.9. As part of encephalopathic workup, CTH was obtained which was negative. TSH wnl and LP performed was unremarkable. This morning, patient noted to be lethargic with change in mental status so MRI Brain was obtained. MRI with acute infarcts bilaterally. Vascular Neurology consulted for imaging findings.

## 2022-02-06 NOTE — ASSESSMENT & PLAN NOTE
--CT head neg for acute intracranial abnormality  --LP unremarkable  --TSH WNL  --MRI showing acute embolic infarcts, vascular neuro consulted, recommended CTA however oxygen requirement increasig and OSWALDO, so holding for now. Spoke with vascular neuro.   --Precedex gtt for acute agitation (max of 0.4)  --will eval for other causes of delirium

## 2022-02-06 NOTE — ASSESSMENT & PLAN NOTE
Patient with new onset a-fib, covid positive, acutely encephalopathic. MRI 2/5 revealed findings suggestive of multifocal acute embolic infarcts, as well as a small acute infarction in the right inferior cerebellum. Vascular neuro consulted, recommended CTA head and neck. However, patient has had increasing oxygen requirements as well as developed an OSWALDO. Spoke with vascular neuro and will hold on CTA for now.     -BP <220  - continue full AC, switched from lovenox to heparin gtt with worsening kidney function

## 2022-02-06 NOTE — HOSPITAL COURSE
Admitted to MICU for AMS and acute hypoxic respiratory failure 2/2 COVID. CTH unremarkable. LP with no signs of infection. Procal negative. Placed on COVID PNA protocol. With Remdesivir, Dexamethasone, and Baricitinib. CT Chest with diffuse patchy ground glass and consolidation. On broad spectrum Vanc and Zosyn. MRI Brain showing multiple acute embolic infarcts, vascular neuro consulted recommending CTA head and neck, however, patient has OSWALDO (likely 2/2 to contrast or pre renal) and increasing oxygen requirements, held off. Patient had increased oxygen requirements resulting in requiring non re-breather. Seroquel added for agitation. Palliative met with family, patient now DNR. Family has decided to transition to comfort measures, and will be coming in.

## 2022-02-06 NOTE — ASSESSMENT & PLAN NOTE
Patient Cr increasing, from 1/0 to 1.6 now. Likely contrast induced.    - avoid nephrotoxic agents  -renally dose medications  - Holding CTA head and neck  - DC vanc  - switched therapeutic lovenox to heparin gtt given worsening kidney function

## 2022-02-06 NOTE — CARE UPDATE
Spoke with daughter Nakita over the phone. Updated her regarding the results of his MRI as well as discussed his increased oxygen demands. Informed her that if his oxygen requirements continue to increase and he reaches the point of intubation that with his co-morbidities and recent strokes that he would likely not do well on the ventilator. She is going to relay information to the rest of her family. Additionally, informed her that the palliative care team will be speaking with them at some point during the week. Patient's daughter Nakita verbalized understanding.     Kumar Marks DO  PGY-1  [facility]

## 2022-02-06 NOTE — PROGRESS NOTES
Therapy with vanc complete and/or consult discontinued by provider.  Pharmacy will sign off, please re-consult as needed.      Thanks for the consult  Nga AraizaD, [facility]  Clinical Pharmacist

## 2022-02-06 NOTE — PROGRESS NOTES
Pharmacokinetic Assessment: IV Vancomycin    Vancomycin serum concentration assessment(s):    - Random level therapeutic at 12.7 mcg/mL overnight  - Goal trough 10-20 mcg/mL  - OSWALDO progressively worsening each day  - Stop scheduled regimen, transition to pulse dosing  - Collect random ~24 hr level on 2/7 w/ AM labs    Drug levels (last 3 results):  Recent Labs   Lab Result Units 02/05/22 2138   Vancomycin-Trough ug/mL 12.7       Pharmacy will continue to follow and monitor vancomycin.    Please contact pharmacy at extension [phone] for questions regarding this assessment.      Thank you for the consult,     Char Grant, PharmD, [facility]  Neurocritical Care Clinical Pharmacist  Ext. [phone]         Patient brief summary:  Lalo Samuel is a 88 y.o. male initiated on antimicrobial therapy with IV Vancomycin for treatment of sepsis      Drug Allergies:   Review of patient's allergies indicates:  No Known Allergies      Renal Function:   Estimated Creatinine Clearance: 26.7 mL/min (A) (based on SCr of 1.6 mg/dL (H)).,     Dialysis Method (if applicable):  N/A    CBC (last 72 hours):  Recent Labs   Lab Result Units 02/03/22 2053 02/05/22 0347 02/06/22  0400   WBC K/uL 11.71 12.90* 17.81*   Hemoglobin g/dL 14.0 12.3* 13.8*   Hemoglobin A1C %  --   --  6.0*   Hematocrit % 45.5 38.8* 44.7   Platelets K/uL 316 153 163   Gran % % 93.1*  --   --    Lymph % % 2.1*  --   --    Mono % % 4.2  --   --    Eosinophil % % 0.0  --   --    Basophil % % 0.1  --   --    Differential Method  Automated  --   --        Metabolic Panel (last 72 hours):  Recent Labs   Lab Result Units 02/03/22 2053 02/03/22 2146 02/04/22 1434 02/05/22 0347 02/05/22 2138 02/06/22  0400   Sodium mmol/L 143  --   --  145 149* 150*   Potassium mmol/L 4.2  --   --  3.6 3.9 3.9   Chloride mmol/L 110  --   --  114* 113* 115*   CO2 mmol/L 23  --   --  23 21* 21*   Glucose mg/dL 111*  --   --  106 98 125*   Glucose, CSF mg/dL  --   --  68  --   --   --     Glucose, UA   --  Negative  --   --   --   --    BUN mg/dL 39*  --   --  52* 60* 69*   Creatinine mg/dL 1.0  --   --  1.2 1.4 1.6*   Creatinine, Urine mg/dL  --  127.0  --   --   --   --    Albumin g/dL 2.6*  --   --  2.2*  --  2.4*   Total Bilirubin mg/dL 1.9*  --   --  1.7*  --  1.9*   Alkaline Phosphatase U/L 92  --   --  90  --  150*   AST U/L 45*  --   --  69*  --  81*   ALT U/L 21  --   --  24  --  28   Magnesium mg/dL  --   --   --  2.2 2.4 2.4   Phosphorus mg/dL  --   --   --  4.3  --  4.1       Vancomycin Administrations:  vancomycin given in the last 96 hours                   vancomycin 1.25 g in dextrose 5% 250 mL IVPB (ready to mix) (mg) 1,250 mg New Bag 02/06/22 0158     1,250 mg New Bag 02/04/22 2147    vancomycin 1.75 g in 5 % dextrose 500 mL IVPB (mg) 1,750 mg New Bag 02/03/22 2248                Microbiologic Results:  Microbiology Results (last 7 days)     Procedure Component Value Units Date/Time    CSF culture [[id number]] Collected: 02/04/22 1434    Order Status: Completed Specimen: CSF (Spinal Fluid) Updated: 02/06/22 0728     CSF CULTURE No Growth to date     Gram Stain Result Rare WBC's      No organisms seen    Narrative:      add on test CXCSF per Dr Ela Vela MD 02/04/2022  19:18 [id number]    Blood Culture #1 **CANNOT BE ORDERED STAT** [[id number]] Collected: 02/03/22 2058    Order Status: Completed Specimen: Blood from Peripheral, Wrist, Left Updated: 02/05/22 2222     Blood Culture, Routine No Growth to date      No Growth to date      No Growth to date    Blood Culture #2 **CANNOT BE ORDERED STAT** [[id number]] Collected: 02/03/22 2058    Order Status: Completed Specimen: Blood from Peripheral, Antecubital, Right Updated: 02/05/22 2222     Blood Culture, Routine No Growth to date      No Growth to date      No Growth to date    CSF culture [[id number]]     Order Status: Completed Specimen: CSF (Spinal Fluid)     CSF culture [[id number]]     Order Status: Canceled Specimen: CSF (Spinal  Fluid)     Culture, Respiratory with Gram Stain [[id number]]     Order Status: No result Specimen: Respiratory

## 2022-02-06 NOTE — ASSESSMENT & PLAN NOTE
Patient is an 89 yo M with PMHx of HTN, Afib, COVID and HLD who was admitted for respiratory failure. Patient encephalopathic on admission with ferritin 877, , ,lactate 3.9 and COVID+. As part of encephalopathic workup, CTH(2/3) was obtained which was negative. TSH wnl and LP performed was unremarkable. This morning, patient noted to be lethargic with change in mental status so MRI Brain W WO was obtained. MRI with acute infarcts bilaterally(R temporal, L frontal, bilat occipital, R inferior cerebellum). Vascular Neurology consulted for imaging findings. No tPA OOW. No IR as symptoms not concerning for LVO at this time.  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 Green.  -Recommend CTA Head and Neck or MRA to assess vasculature. Continue AC.    Antithrombotics for secondary stroke prevention: On heparin gtt    Statins for secondary stroke prevention and hyperlipidemia, if present:   Statins: Atorvastatin- 40 mg daily    Aggressive risk factor modification: HTN, HLD, A-Fib, EF 20%     Rehab efforts: The patient has been evaluated by a stroke team provider and the therapy needs have been fully considered based off the presenting complaints and exam findings. The following therapy evaluations are needed: PT evaluate and treat, OT evaluate and treat, SLP evaluate and treat    Diagnostics ordered/pending: CTA Head to assess vasculature , CTA Neck/Arch to assess vasculature, MRA head to assess vasculature, MRA neck/arch to assess vasculature    VTE prophylaxis: Mechanical prophylaxis: Place SCDs  None: Reason for No Pharmacological VTE Prophylaxis: Currently on anticoagulation    BP parameters: Infarct: No intervention, SBP <220

## 2022-02-06 NOTE — ASSESSMENT & PLAN NOTE
--no obvious source of infection; no leukocytosis and neg procal  --DC vanc, continuing zosyn for now  --follow up blood (NGTD), CSF (NGTD) and sputum cx  --concern for possible RLE ischemia (extremity cool to touch and unable to doppler pulse) -> LE arterial US negative  --Lactate downtrending

## 2022-02-06 NOTE — ASSESSMENT & PLAN NOTE
--Covid 19 positive on 1/26. Patient is not vaccinated.   --continue dexamethasone, barictinib and remdesivir  --started on full dose anticoagulation; given mild to moderate level of respiratory support, concern for limb ischemia and new onset a fib, will continue for now. Given OSWALDO switched from therapeutic lovenox to Heparin  --currently requiring 15L HFNC; wean as tolerated, however anticipate that his FiO2 requirement will likely worsen given typical disease trajectory

## 2022-02-06 NOTE — PROGRESS NOTES
Pharmacokinetic Assessment Follow Up: IV Vancomycin    Vancomycin Regimen Assessment & Plan:  - Vancomycin trough level (24-hour level) resulted at 12.7 mcg/mL, which is considered therapeutic (goal: 10-20 mcg/mL)  - OSWALDO; SCr increased from 1 to 1.4 mg/dL in 48 hours   - Blood culture (2/3) NGTD  - Continue vancomycin 1250 mg IV every 24 hours  - Next vancomycin level scheduled on 2/7 at 21:00 or sooner if further change in renal function      Drug levels (last 3 results):  Recent Labs   Lab Result Units 02/05/22 2138   Vancomycin-Trough ug/mL 12.7       Pharmacy will continue to follow and monitor vancomycin.    Please contact pharmacy at extension [phone] for questions regarding this assessment.    Thank you for the consult,   Bronwyn Duarte       Patient brief summary:  Lalo Samuel is a 88 y.o. male initiated on antimicrobial therapy with IV Vancomycin for treatment of lower respiratory infection      Drug Allergies:   Review of patient's allergies indicates:  No Known Allergies    Actual Body Weight:   69.9 kg    Renal Function:   Estimated Creatinine Clearance: 30.5 mL/min (based on SCr of 1.4 mg/dL).     Dialysis Method (if applicable):  N/A    CBC (last 72 hours):  Recent Labs   Lab Result Units 02/03/22 2053 02/05/22 0347   WBC K/uL 11.71 12.90*   Hemoglobin g/dL 14.0 12.3*   Hematocrit % 45.5 38.8*   Platelets K/uL 316 153   Gran % % 93.1*  --    Lymph % % 2.1*  --    Mono % % 4.2  --    Eosinophil % % 0.0  --    Basophil % % 0.1  --    Differential Method  Automated  --        Metabolic Panel (last 72 hours):  Recent Labs   Lab Result Units 02/03/22 2053 02/03/22  2146 02/04/22  1434 02/05/22  0347 02/05/22 2138   Sodium mmol/L 143  --   --  145 149*   Potassium mmol/L 4.2  --   --  3.6 3.9   Chloride mmol/L 110  --   --  114* 113*   CO2 mmol/L 23  --   --  23 21*   Glucose mg/dL 111*  --   --  106 98   Glucose, CSF mg/dL  --   --  68  --   --    Glucose, UA   --  Negative  --   --   --    BUN mg/dL 39*   --   --  52* 60*   Creatinine mg/dL 1.0  --   --  1.2 1.4   Creatinine, Urine mg/dL  --  127.0  --   --   --    Albumin g/dL 2.6*  --   --  2.2*  --    Total Bilirubin mg/dL 1.9*  --   --  1.7*  --    Alkaline Phosphatase U/L 92  --   --  90  --    AST U/L 45*  --   --  69*  --    ALT U/L 21  --   --  24  --    Magnesium mg/dL  --   --   --  2.2 2.4   Phosphorus mg/dL  --   --   --  4.3  --        Vancomycin Administrations:  vancomycin given in the last 96 hours                   vancomycin 1.25 g in dextrose 5% 250 mL IVPB (ready to mix) (mg) 1,250 mg New Bag 02/04/22 2147    vancomycin 1.75 g in 5 % dextrose 500 mL IVPB (mg) 1,750 mg New Bag 02/03/22 2248                Microbiologic Results:  Microbiology Results (last 7 days)     Procedure Component Value Units Date/Time    Blood Culture #1 **CANNOT BE ORDERED STAT** [[id number]] Collected: 02/03/22 2058    Order Status: Completed Specimen: Blood from Peripheral, Wrist, Left Updated: 02/05/22 2222     Blood Culture, Routine No Growth to date      No Growth to date      No Growth to date    Blood Culture #2 **CANNOT BE ORDERED STAT** [[id number]] Collected: 02/03/22 2058    Order Status: Completed Specimen: Blood from Peripheral, Antecubital, Right Updated: 02/05/22 2222     Blood Culture, Routine No Growth to date      No Growth to date      No Growth to date    CSF culture [[id number]] Collected: 02/04/22 1434    Order Status: Completed Specimen: CSF (Spinal Fluid) Updated: 02/05/22 0725     CSF CULTURE No Growth to date     Gram Stain Result Rare WBC's      No organisms seen    Narrative:      add on test CXCSF per Dr Ela Vela MD 02/04/2022  19:18 [id number]    CSF culture [[id number]]     Order Status: Completed Specimen: CSF (Spinal Fluid)     CSF culture [[id number]]     Order Status: Canceled Specimen: CSF (Spinal Fluid)     Culture, Respiratory with Gram Stain [[id number]]     Order Status: No result Specimen: Respiratory

## 2022-02-06 NOTE — PLAN OF CARE
[facility] DAILY GOALS       A: Awake    RASS: Goal -    Actual - RASS (Martin Agitation-Sedation Scale): 2-->agitated   Restraint necessity: Clinical Justification: Removing medical devices,Climbing out of bed,Treatment Interference  B: Breathe   SBT: Not intubated   C: Coordinate A & B, analgesics/sedatives   Pain: managed    SAT: Not intubated  D: Delirium   CAM-ICU: Overall CAM-ICU: Positive  E: Early(intubated/ Progressive (non-intubated) Mobility   MOVE Screen: Pass   Activity: Activity Management: Arm raise - L1,Leg kicks - L2,Ankle pumps - L1  FAS: Feeding/Nutrition   Diet order: Diet/Nutrition Received: NPO,    T: Thrombus   DVT prophylaxis: VTE Required Core Measure: Pharmacological prophylaxis initiated/maintained  H: HOB Elevation   Head of Bed (HOB) Positioning: HOB elevated,HOB at 20-30 degrees  U: Ulcer Prophylaxis   GI: yes  G: Glucose control   managed    S: Skin   Bathing/Skin Care: back care,dressed/undressed,electrode patches/site rotation,incontinence care,linen changed  Device Skin Pressure Protection: positioning supports utilized,pressure points protected,skin-to-device areas padded  Pressure Reduction Devices: foam padding utilized,heel offloading device utilized,positioning supports utilized,pressure-redistributing mattress utilized,specialty bed utilized  Pressure Reduction Techniques: frequent weight shift encouraged,sit time limited to 2 hours,weight shift assistance provided,positioned off wounds,pressure points protected,heels elevated off bed  Skin Protection: incontinence pads utilized,pulse oximeter probe site changed  B: Bowel Function   constipation   I: Indwelling Catheters   Valenzuela necessity:      Urethral Catheter 02/04/22 0504 Straight-tip 18 Fr.-Reason for Continuing Urinary Catheterization: Critically ill in ICU and requiring hourly monitoring of intake/output   CVC necessity: Yes  D: De-escalation Antibiotics   Yes    Family/Goals of care/Code Status   Code Status: Full  Code    24H Vital Sign Range  Temp:  [96.7 °F (35.9 °C)-98.1 °F (36.7 °C)]   Pulse:  []   Resp:  [20-32]   BP: ()/()   SpO2:  [89 %-100 %]      Shift Events   Pt screamed and moaned on and off through out the night, with out relief of PRN medications, and MD Ladarius Sosa and DO Nestor Back aware of situation. Both are aware of pt R sided absent of movement. DO Nestor Back at pt bedside,with pt moaning and screaming. DO aware of dusky, cyanotic toes. DO notified of Pt heart in A-fib RVR labs ordered per DO request. New Iv placed.     VS and assessment per flow sheet, plan of care reviewed with patient, concerns addressed, will continue to monitor.    Giuliano Abdi

## 2022-02-06 NOTE — PLAN OF CARE
Problem: Infection  Goal: Absence of Infection Signs and Symptoms  Outcome: Ongoing, Progressing     Problem: Adult Inpatient Plan of Care  Goal: Plan of Care Review  Outcome: Ongoing, Progressing  Goal: Patient-Specific Goal (Individualized)  Outcome: Ongoing, Progressing  Goal: Absence of Hospital-Acquired Illness or Injury  Outcome: Ongoing, Progressing  Goal: Optimal Comfort and Wellbeing  Outcome: Ongoing, Progressing  Goal: Readiness for Transition of Care  Outcome: Ongoing, Progressing     Problem: Fall Injury Risk  Goal: Absence of Fall and Fall-Related Injury  Outcome: Ongoing, Progressing     Problem: Restraint, Nonbehavioral (Nonviolent)  Goal: Absence of Harm or Injury  Outcome: Ongoing, Progressing     Problem: Skin Injury Risk Increased  Goal: Skin Health and Integrity  Outcome: Ongoing, Progressing     [facility] DAILY GOALS       A: Awake    RASS: Goal -    Actual - RASS (Martin Agitation-Sedation Scale): -3-->moderate sedation   Restraint necessity: Clinical Justification: Removing medical devices,Treatment Interference  B: Breathe   SBT: Not intubated   C: Coordinate A & B, analgesics/sedatives   Pain: managed    SAT: Not intubated  D: Delirium   CAM-ICU: Overall CAM-ICU: Positive  E: Early(intubated/ Progressive (non-intubated) Mobility   MOVE Screen: Fail   Activity: Activity Management: Rolling - L1  FAS: Feeding/Nutrition   Diet order: Diet/Nutrition Received: NPO,    T: Thrombus   DVT prophylaxis: VTE Required Core Measure: Pharmacological prophylaxis initiated/maintained  H: HOB Elevation   Head of Bed (HOB) Positioning: HOB at 30-45 degrees  U: Ulcer Prophylaxis   GI: yes  G: Glucose control   managed    S: Skin   Bathing/Skin Care: back care,bath, complete,linen changed,incontinence care  Device Skin Pressure Protection: absorbent pad utilized/changed,pressure points protected  Pressure Reduction Devices: foam padding utilized,specialty bed utilized  Pressure Reduction Techniques: frequent  weight shift encouraged,weight shift assistance provided  Skin Protection: drying agents applied,incontinence pads utilized,hydrocolloids used,tubing/devices free from skin contact  B: Bowel Function   no issues   I: Indwelling Catheters   Valenzuela necessity:      Urethral Catheter 02/04/22 0504 Straight-tip 18 Fr.-Reason for Continuing Urinary Catheterization: Critically ill in ICU and requiring hourly monitoring of intake/output   CVC necessity: No  D: De-escalation Antibiotics   Yes    Family/Goals of care/Code Status   Code Status: Full Code    24H Vital Sign Range  Temp:  [96.7 °F (35.9 °C)-98.1 °F (36.7 °C)]   Pulse:  []   Resp:  [20-30]   BP: ()/()   SpO2:  [89 %-100 %]      Shift Events   Patient taken down for MRI. Patient started on levo for short period of time due to low BP after being started on precedex.  Pressure rebounded after precedex stopped and patient now off of levo.  Patient given meds for agitation and pain throughout the shift. O2 requirements have slightly increased to maintain saturation goal.    VS and assessment per flow sheet, patient progressing towards goals as tolerated, plan of care reviewed with [unfilled] and family, all concerns addressed, will continue to monitor.    Shoshana Kapadia

## 2022-02-06 NOTE — ASSESSMENT & PLAN NOTE
Patient is an 87 yo M with PMHx of HTN, Afib, COVID and HLD who was admitted for respiratory failure. Patient encephalopathic on admission with ferritin 877, , ,lactate 3.9 and COVID+. As part of encephalopathic workup, CTH(2/3) was obtained which was negative. TSH wnl and LP performed was unremarkable. This morning, patient noted to be lethargic with change in mental status so MRI Brain W WO was obtained. MRI with acute infarcts bilaterally(R temporal, L frontal, bilat occipital, R inferior cerebellum). Vascular Neurology consulted for imaging findings. No tPA OOW. No IR as symptoms not concerning for LVO at this time.  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 Green.  -Recommend CTA Head and Neck to assess vasculature, a1c, lipid panel.  -Recommend continuation of full AC given patient with afib and embolic infarcts on MRI as well as EF of 20%.    Antithrombotics for secondary stroke prevention: On lovenox 70 Q12H, continue AC    Statins for secondary stroke prevention and hyperlipidemia, if present:   Statins: Atorvastatin- 40 mg daily    Aggressive risk factor modification: HTN, HLD, A-Fib, EF 20%     Rehab efforts: The patient has been evaluated by a stroke team provider and the therapy needs have been fully considered based off the presenting complaints and exam findings. The following therapy evaluations are needed: PT evaluate and treat, OT evaluate and treat, SLP evaluate and treat    Diagnostics ordered/pending: CTA Head to assess vasculature , CTA Neck/Arch to assess vasculature, HgbA1C to assess blood glucose levels, Lipid Profile to assess cholesterol levels    VTE prophylaxis: Mechanical prophylaxis: Place SCDs  None: Reason for No Pharmacological VTE Prophylaxis: Currently on anticoagulation    BP parameters: Infarct: No intervention, SBP <220

## 2022-02-06 NOTE — ASSESSMENT & PLAN NOTE
Patient with Hypoxic Respiratory failure which is Acute.  he is not on home oxygen. Supplemental oxygen was provided and noted-  .   Signs/symptoms of respiratory failure include- tachypnea and increased work of breathing. Contributing diagnoses includes - CHF, Pneumonia and Covid-19 Labs and images were reviewed. Patient Has recent ABG, which has been reviewed. Will treat underlying causes and adjust management of respiratory failure as follows-     --Likely multifactorial in setting of Covid-19 infection and suspected HF (elevated BNP with reduced LVEF on bedside echo)  --Continue tx for Covid (see above); Add Baricitinib (2/5)  --x1 dose lasix on 2/4 with good UOP  --lower suspicion for superimposed bacterial infection (no leukocytosis and neg procal), however was on broad spectrum antibiotics vanc and zosyn. Given worsening kidney infection and low suspicion of bacterial infection without positive BC will DC vanc.   --f/u blood and sputum cx  --currently requiring 15L HFNC; wean as tolerated    TTE 2/4/22:  · The left ventricle is normal in size with severely decreased systolic function. The estimated ejection fraction is 20%.  · Normal right ventricular size with moderately to severely reduced right ventricular systolic function.  · Left ventricular diastolic dysfunction.  · Biatrial enlargement.  · The ascending aorta is mildly dilated.  · Mild mitral regurgitation.  · Mild tricuspid regurgitation.  · The estimated PA systolic pressure is 51 mmHg.  · Intermediate central venous pressure (8 mmHg).

## 2022-02-06 NOTE — SUBJECTIVE & OBJECTIVE
Neurologic Chief Complaint: R hemiparesis    Subjective:     Interval History: Patient is seen for follow-up neurological assessment and treatment recommendations: R hemiparesis. MRI with embolic infarcts. On heparin gtt.     HPI, Past Medical, Family, and Social History remains the same as documented in the initial encounter.     Review of Systems   Unable to perform ROS: Patient nonverbal     Scheduled Meds:   albuterol  2 puff Inhalation Q8H    baricitinib  2 mg Oral Daily    dexamethasone  6 mg Intravenous Daily    levothyroxine  50 mcg Oral Daily    multivitamin  1 tablet Oral Daily    mupirocin   Nasal BID    piperacillin-tazobactam (ZOSYN) IVPB  4.5 g Intravenous Q8H    remdesivir infusion  100 mg Intravenous Daily    sodium chloride 0.9%  250 mL Intravenous Once     Continuous Infusions:   dexmedetomidine (PRECEDEX) infusion 0.2 mcg/kg/hr (02/06/22 1049)    heparin (porcine) in D5W 12 Units/kg/hr (02/06/22 1207)    NORepinephrine bitartrate-D5W 0 mcg/kg/min (02/05/22 1800)     PRN Meds:acetaminophen, benzonatate, dextrose 10%, dextrose 10%, glucagon (human recombinant), glucose, glucose, midazolam, morphine, naloxone, sodium chloride 0.9%    Objective:     Vital Signs (Most Recent):  Temp: 98 °F (36.7 °C) (02/06/22 1200)  Pulse: 98 (02/06/22 1200)  Resp: 18 (02/06/22 1200)  BP: (!) 149/74 (02/06/22 1200)  SpO2: 96 % (02/06/22 1423)  BP Location: Right arm    Vital Signs Range (Last 24H):  Temp:  [96.7 °F (35.9 °C)-98.5 °F (36.9 °C)]   Pulse:  []   Resp:  [14-44]   BP: ()/()   SpO2:  [88 %-97 %]   BP Location: Right arm    Physical Exam  Vitals and nursing note reviewed.   Constitutional:       Appearance: He is ill-appearing.   HENT:      Head: Normocephalic and atraumatic.      Nose: Nose normal.   Eyes:      Conjunctiva/sclera: Conjunctivae normal.      Comments: R gaze   Cardiovascular:      Rate and Rhythm: Normal rate.   Pulmonary:      Effort: Respiratory distress present.    Musculoskeletal:      Right lower leg: No edema.      Left lower leg: No edema.   Skin:     General: Skin is warm and dry.   Neurological:      Mental Status: He is lethargic.      Comments: R facial droop  Moaning, unable to follow commands  R hemiparesis  Moving L side spontaneuosly         Neurological Exam:   *Exam limited due to patient's AMS  LOC: lethargic  Attention Span: poor  Language: Mute  Articulation: Mute/Anarthric  Orientation: unable to assess as patient nonverbal  R gaze preference  Motor: R hemiparesis, moving LUE/LLE spontaneously      Laboratory:  CMP:   Recent Labs   Lab 02/06/22  0400   CALCIUM 8.6*   ALBUMIN 2.4*   PROT 5.9*   *   K 3.9   CO2 21*   *   BUN 69*   CREATININE 1.6*   ALKPHOS 150*   ALT 28   AST 81*   BILITOT 1.9*     CBC:   Recent Labs   Lab 02/06/22  1100   WBC 18.36*   RBC 5.31   HGB 13.2*   HCT 42.3      MCV 80*   MCH 24.9*   MCHC 31.2*     Lipid Panel:   Recent Labs   Lab 02/06/22  0400   CHOL 97*   LDLCALC 58.6*   HDL 25*   TRIG 67     Coagulation:   Recent Labs   Lab 02/06/22  1100   INR 1.6*   APTT 30.6     Platelet Aggregation Study: No results for input(s): PLTAGG, PLTAGINTERP, PLTAGREGLACO, ADPPLTAGGREG in the last 168 hours.  Hgb A1C:   Recent Labs   Lab 02/06/22  0400   HGBA1C 6.0*     TSH:   Recent Labs   Lab 02/03/22  2122   TSH 3.057       Diagnostic Results        Brain/Vessel imaging:  MRI Brain W WO 2/5/2022  Numerous regions of diffusion restriction in the supratentorial parenchyma suggestive of multifocal acute embolic infarcts.     Small acute infarction in the right inferior cerebellum.     Generalized volume loss and microvascular ischemic change.     CTH WO 2/3/2022  No acute intraparenchymal hemorrhage or major vascular distribution infarction.  Additional evaluation, as clinically warranted.     Generalized cerebral volume loss and chronic ischemic microvascular changes.     Cardiac Evaluation:   TTE 2/4/2022  · The left ventricle is  normal in size with severely decreased systolic function. The estimated ejection fraction is 20%.  · Normal right ventricular size with moderately to severely reduced right ventricular systolic function.  · Left ventricular diastolic dysfunction.  · Biatrial enlargement.  · The ascending aorta is mildly dilated.  · Mild mitral regurgitation.  · Mild tricuspid regurgitation.  · The estimated PA systolic pressure is 51 mmHg.  · Intermediate central venous pressure (8 mmHg).

## 2022-02-06 NOTE — CONSULTS
[facility] - Intensive Care ([facility]-)  Vascular Neurology  Comprehensive Stroke Center  Consult Note    Inpatient consult to Vascular (Stroke) Neurology  Consult performed by: Kaity Castrejon PA-C  Consult ordered by: James Gregg MD        Assessment/Plan:     Patient is a 88 y.o. year old male with:    Acute arterial ischemic stroke, multifocal, multiple vascular territories  Patient is an 89 yo M with PMHx of HTN, Afib, COVID and HLD who was admitted for respiratory failure. Patient encephalopathic on admission with ferritin 877, , ,lactate 3.9 and COVID+. As part of encephalopathic workup, CTH(2/3) was obtained which was negative. TSH wnl and LP performed was unremarkable. This morning, patient noted to be lethargic with change in mental status so MRI Brain W WO was obtained. MRI with acute infarcts bilaterally(R temporal, L frontal, bilat occipital, R inferior cerebellum). Vascular Neurology consulted for imaging findings. No tPA OOW. No IR as symptoms not concerning for LVO at this time.  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 Green.  -Recommend CTA Head and Neck to assess vasculature, a1c, lipid panel.  -Recommend continuation of full AC given patient with afib and embolic infarcts on MRI as well as EF of 20%.    Antithrombotics for secondary stroke prevention: On lovenox 70 Q12H, continue AC    Statins for secondary stroke prevention and hyperlipidemia, if present:   Statins: Atorvastatin- 40 mg daily    Aggressive risk factor modification: HTN, HLD, A-Fib, EF 20%     Rehab efforts: The patient has been evaluated by a stroke team provider and the therapy needs have been fully considered based off the presenting complaints and exam findings. The following therapy evaluations are needed: PT evaluate and treat, OT evaluate and treat, SLP evaluate and treat    Diagnostics ordered/pending: CTA Head to assess  vasculature , CTA Neck/Arch to assess vasculature, HgbA1C to assess blood glucose levels, Lipid Profile to assess cholesterol levels    VTE prophylaxis: Mechanical prophylaxis: Place SCDs  None: Reason for No Pharmacological VTE Prophylaxis: Currently on anticoagulation    BP parameters: Infarct: No intervention, SBP <220        Mixed hyperlipidemia  Stroke RF  Recommend lipid panel and atorvastatin 40  Goal LDL<70    A-fib  Stroke RF  On lovenox 70 BID  Recommend continuation of AC     Pneumonia due to COVID-19 virus  Stroke RF  Hypercoagulable state  On high flow nasal cannula  On baricitinib, remdesivir and dexamethasone        HTN (hypertension), benign  Stroke RF  Acute infarct on imaging, recommend SBP<220      STROKE DOCUMENTATION          NIH Scale:  1a. Level of Consciousness: 2-->Not alert, requires repeated stimulation to attend, or is obtunded and requires strong or painful stimulation to make movements (not stereotyped)  1b. LOC Questions: 2-->Answers neither question correctly  1c. LOC Commands: 2-->Performs neither task correctly  2. Best Gaze: 0-->Normal  3. Visual: 0-->No visual loss  4. Facial Palsy: 0-->Normal symmetrical movements  5a. Motor Arm, Left: 2-->Some effort against gravity, limb cannot get to or maintain (if cued) 90 (or 45) degrees, drifts down to bed, but has some effort against gravity  5b. Motor Arm, Right: 3-->No effort against gravity, limb falls  6a. Motor Leg, Left: 2-->Some effort against gravity, leg falls to bed by 5 secs, but has some effort against gravity  6b. Motor Leg, Right: 4-->No movement  7. Limb Ataxia: 0-->Absent  8. Sensory: 1-->Mild-to-moderate sensory loss, patient feels pinprick is less sharp or is dull on the affected side, or there is a loss of superficial pain with pinprick, but patient is aware of being touched  9. Best Language: 3-->Mute, global aphasia, no usable speech or auditory comprehension  10. Dysarthria: 2-->Severe dysarthria, patients speech is  so slurred as to be unintelligible in the absence of or out of proportion to any dysphasia, or is mute/anarthric  11. Extinction and Inattention (formerly Neglect): 0-->No abnormality  Total (NIH Stroke Scale): 23    Modified [place] Score: 0  Andi Coma Scale:    ABCD2 Score:    [id number] Score:   HAS -BLED Score:   ICH Score:   Hunt & Ledezma Classification:       Thrombolysis Candidate? No, Out of window     Delays to Thrombolysis?  Not Applicable    Interventional Revascularization Candidate?   Is the patient eligible for mechanical endovascular reperfusion (ANA LILIA)?  No; at this time symptoms not suggestive of large vessel occlusion    Delays to Thrombectomy? Not Applicable    Hemorrhagic change of an Ischemic Stroke: Does this patient have an ischemic stroke with hemorrhagic changes? No     Subjective:     History of Present Illness:  Patient is an 89 yo M with PMHx of HTN, Afib, COVID and HLD who was admitted for respiratory failure. History on admission obtained per chart review and family as patient encephalopathic. On day of admission, patient with ferritin 877, , ,lactate 3.9. As part of encephalopathic workup, CTH was obtained which was negative. TSH wnl and LP performed was unremarkable. This morning, patient noted to be lethargic with change in mental status so MRI Brain was obtained. MRI with acute infarcts bilaterally. Vascular Neurology consulted for imaging findings.     Case discussed with vascular neurology fellow/staff.        Past Medical History:   Diagnosis Date    Basal cell carcinoma     right lat forehead, right lat eye, right chin, left nose     Dyslipidemia     Elevated PSA     Hypertension     Hypothyroid     Osteoarthritis of both knees      Past Surgical History:   Procedure Laterality Date    CATARACT EXTRACTION      PC IOL OU     Family History   Problem Relation Age of Onset    Hypertension Mother     Hypertension Father     Hypertension Sister      Hypertension Brother     Amblyopia Neg Hx     Blindness Neg Hx     Cancer Neg Hx     Cataracts Neg Hx     Diabetes Neg Hx     Glaucoma Neg Hx     Macular degeneration Neg Hx     Retinal detachment Neg Hx     Strabismus Neg Hx     Stroke Neg Hx     Thyroid disease Neg Hx     Melanoma Neg Hx      Social History     Tobacco Use    Smoking status: Never Smoker    Smokeless tobacco: Never Used   Substance Use Topics    Alcohol use: No    Drug use: No     Review of patient's allergies indicates:  No Known Allergies    Medications: I have reviewed the current medication administration record.    Medications Prior to Admission   Medication Sig Dispense Refill Last Dose    finasteride (PROSCAR) 5 mg tablet Take 1 tablet (5 mg total) by mouth once daily. 90 tablet 3     levothyroxine (SYNTHROID) 50 MCG tablet Take 1 tablet (50 mcg total) by mouth once daily. 90 tablet 3     amLODIPine (NORVASC) 5 MG tablet Take 1 tablet (5 mg total) by mouth once daily. 90 tablet 3        Review of Systems   Unable to perform ROS: Mental status change     Objective:     Vital Signs (Most Recent):  Temp: 98 °F (36.7 °C) (02/05/22 1501)  Pulse: 89 (02/05/22 1801)  Resp: (!) 27 (02/05/22 1745)  BP: (!) 144/85 (02/05/22 1745)  SpO2: 97 % (02/05/22 1801)    Vital Signs Range (Last 24H):  Temp:  [96.7 °F (35.9 °C)-98.1 °F (36.7 °C)]   Pulse:  []   Resp:  [20-30]   BP: ()/()   SpO2:  [89 %-100 %]     Physical Exam  Vitals and nursing note reviewed.   Constitutional:       General: He is in acute distress.      Appearance: He is ill-appearing. He is not diaphoretic.      Interventions: Nasal cannula in place.   HENT:      Head: Normocephalic and atraumatic.      Right Ear: External ear normal.      Left Ear: External ear normal.      Nose: Nose normal.   Eyes:      General: No scleral icterus.        Right eye: No discharge.         Left eye: No discharge.      Conjunctiva/sclera: Conjunctivae normal.    Cardiovascular:      Rate and Rhythm: Normal rate.   Pulmonary:      Effort: Respiratory distress present.      Comments: Increased work of breathing noted    Abdominal:      General: Abdomen is flat. There is no distension.   Musculoskeletal:      Right lower leg: No edema.      Left lower leg: No edema.   Skin:     General: Skin is warm and dry.   Neurological:      Mental Status: He is lethargic.      Motor: Weakness present.      Comments: Moaning, unable to follow commands  Minimal WD of bilat LE to pain  No WD of RUE to pain  WD of LUE to pain   Psychiatric:      Comments: Unable to assess mood or thought content due to patient's change in mental status         Neurological Exam:   *Exam limited due to patient's AMS  LOC: lethargic  Attention Span: poor  Language: Mute  Articulation: Mute/Anarthric  Orientation: unable to assess as patient nonverbal  Visual Fields: Blinks to threat BL  Pupils (CN II, III): PERRL  Sensation: Minimal WD to pain of bilat LE, no WD to pain in RUE, WD of LUE to pain      Laboratory:  CMP:   Recent Labs   Lab 02/05/22  0347   CALCIUM 8.2*   ALBUMIN 2.2*   PROT 4.8*      K 3.6   CO2 23   *   BUN 52*   CREATININE 1.2   ALKPHOS 90   ALT 24   AST 69*   BILITOT 1.7*     CBC:   Recent Labs   Lab 02/05/22  0347   WBC 12.90*   RBC 4.91   HGB 12.3*   HCT 38.8*      MCV 79*   MCH 25.1*   MCHC 31.7*     Lipid Panel: No results for input(s): CHOL, LDLCALC, HDL, TRIG in the last 168 hours.  Coagulation: No results for input(s): PT, INR, APTT in the last 168 hours.  Hgb A1C: No results for input(s): HGBA1C in the last 168 hours.  TSH:   Recent Labs   Lab 02/03/22 2122   TSH 3.057       Diagnostic Results:      Brain/Vessel imaging:  MRI Brain W WO 2/5/2022  Numerous regions of diffusion restriction in the supratentorial parenchyma suggestive of multifocal acute embolic infarcts.     Small acute infarction in the right inferior cerebellum.     Generalized volume loss and  microvascular ischemic change.    CT WO 2/3/2022  No acute intraparenchymal hemorrhage or major vascular distribution infarction.  Additional evaluation, as clinically warranted.     Generalized cerebral volume loss and chronic ischemic microvascular changes.    Cardiac Evaluation:   TTE 2/4/2022  · The left ventricle is normal in size with severely decreased systolic function. The estimated ejection fraction is 20%.  · Normal right ventricular size with moderately to severely reduced right ventricular systolic function.  · Left ventricular diastolic dysfunction.  · Biatrial enlargement.  · The ascending aorta is mildly dilated.  · Mild mitral regurgitation.  · Mild tricuspid regurgitation.  · The estimated PA systolic pressure is 51 mmHg.  · Intermediate central venous pressure (8 mmHg).          Kaity Castrejon PA-C  Comprehensive Stroke Center  Department of Vascular Neurology   [facility] - Intensive Care ([place] [id number])

## 2022-02-06 NOTE — ASSESSMENT & PLAN NOTE
--Home amlodipine currently on hold  --Consider starting BB for rate control if pt develops RVR given new onset a fib; would also benefit from GDMT given reduced EF  --given elevated BNP and cxr concerning for pulm edema, recieved lasix x1 dose

## 2022-02-06 NOTE — SUBJECTIVE & OBJECTIVE
Interval History/Significant Events: Patient has had increasing oxygen requirements, now on 15 L high flow. Continues to not respond to questioning. MRI 2/5 showing embolic infarcts. Continues to be afebrile, however will have episodes of hypertension as well as tachycardia.     Review of Systems   Unable to perform ROS: Mental status change   Respiratory: Positive for shortness of breath.    Psychiatric/Behavioral: Positive for confusion.     Objective:     Vital Signs (Most Recent):  Temp: 98.2 °F (36.8 °C) (02/06/22 0800)  Pulse: (!) 121 (02/06/22 0821)  Resp: (!) 29 (02/06/22 0821)  BP: (!) 168/105 (02/06/22 0800)  SpO2: (!) 92 % (02/06/22 0821) Vital Signs (24h Range):  Temp:  [96.7 °F (35.9 °C)-98.5 °F (36.9 °C)] 98.2 °F (36.8 °C)  Pulse:  [] 121  Resp:  [14-44] 29  SpO2:  [88 %-98 %] 92 %  BP: ()/() 168/105   Weight: 69.9 kg (154 lb)  Body mass index is 26.43 kg/m².      Intake/Output Summary (Last 24 hours) at 2/6/2022 1020  Last data filed at 2/6/2022 0600  Gross per 24 hour   Intake 1127.58 ml   Output 650 ml   Net 477.58 ml       Physical Exam  Vitals and nursing note reviewed.   Constitutional:       Appearance: He is normal weight. He is ill-appearing. He is not toxic-appearing.   HENT:      Head: Normocephalic and atraumatic.      Nose: Nose normal.   Eyes:      General: No scleral icterus.     Conjunctiva/sclera: Conjunctivae normal.   Cardiovascular:      Rate and Rhythm: Tachycardia present. Rhythm irregular.   Pulmonary:      Comments: Intermittent pursed lip breathing  Abdominal:      General: Abdomen is flat. There is no distension.      Palpations: Abdomen is soft.      Tenderness: There is no abdominal tenderness. There is no guarding.   Musculoskeletal:         General: No swelling, tenderness or deformity. Normal range of motion.      Cervical back: Normal range of motion.      Right lower leg: No edema.      Left lower leg: No edema.   Skin:     General: Skin is warm and  dry.      Capillary Refill: Capillary refill takes less than 2 seconds.   Neurological:      General: No focal deficit present.      Mental Status: He is alert and oriented to person, place, and time. Mental status is at baseline.      GCS: GCS eye subscore is 4. GCS verbal subscore is 3. GCS motor subscore is 5.      Comments: Moves all extremities. Mental status precludes comprehensive neuro exam.    Psychiatric:         Speech: He is noncommunicative.         Behavior: Behavior is agitated.         Cognition and Memory: Cognition is impaired.         Vents:     Lines/Drains/Airways     Drain                 Urethral Catheter 02/04/22 0504 Straight-tip 18 Fr. 2 days          Peripheral Intravenous Line                 Peripheral IV - Single Lumen 02/03/22 2121 20 G Right Antecubital 2 days         Peripheral IV - Single Lumen 02/05/22 2230 20 G Left Antecubital <1 day              Significant Labs:    CBC/Anemia Profile:  Recent Labs   Lab 02/05/22  0347 02/06/22  0400   WBC 12.90* 17.81*   HGB 12.3* 13.8*   HCT 38.8* 44.7    163   MCV 79* 80*   RDW 16.2* 17.5*        Chemistries:  Recent Labs   Lab 02/05/22  0347 02/05/22  2138 02/06/22  0400    149* 150*   K 3.6 3.9 3.9   * 113* 115*   CO2 23 21* 21*   BUN 52* 60* 69*   CREATININE 1.2 1.4 1.6*   CALCIUM 8.2* 8.6* 8.6*   ALBUMIN 2.2*  --  2.4*   PROT 4.8*  --  5.9*   BILITOT 1.7*  --  1.9*   ALKPHOS 90  --  150*   ALT 24  --  28   AST 69*  --  81*   MG 2.2 2.4 2.4   PHOS 4.3  --  4.1     Significant Imaging:  I have reviewed all pertinent imaging results/findings within the past 24 hours.

## 2022-02-06 NOTE — ASSESSMENT & PLAN NOTE
Stroke RF  Hypercoagulable state  On high flow nasal cannula  On baricitinib, remdesivir and dexamethasone

## 2022-02-06 NOTE — SUBJECTIVE & OBJECTIVE
Past Medical History:   Diagnosis Date    Basal cell carcinoma     right lat forehead, right lat eye, right chin, left nose     Dyslipidemia     Elevated PSA     Hypertension     Hypothyroid     Osteoarthritis of both knees      Past Surgical History:   Procedure Laterality Date    CATARACT EXTRACTION      PC IOL OU     Family History   Problem Relation Age of Onset    Hypertension Mother     Hypertension Father     Hypertension Sister     Hypertension Brother     Amblyopia Neg Hx     Blindness Neg Hx     Cancer Neg Hx     Cataracts Neg Hx     Diabetes Neg Hx     Glaucoma Neg Hx     Macular degeneration Neg Hx     Retinal detachment Neg Hx     Strabismus Neg Hx     Stroke Neg Hx     Thyroid disease Neg Hx     Melanoma Neg Hx      Social History     Tobacco Use    Smoking status: Never Smoker    Smokeless tobacco: Never Used   Substance Use Topics    Alcohol use: No    Drug use: No     Review of patient's allergies indicates:  No Known Allergies    Medications: I have reviewed the current medication administration record.    Medications Prior to Admission   Medication Sig Dispense Refill Last Dose    finasteride (PROSCAR) 5 mg tablet Take 1 tablet (5 mg total) by mouth once daily. 90 tablet 3     levothyroxine (SYNTHROID) 50 MCG tablet Take 1 tablet (50 mcg total) by mouth once daily. 90 tablet 3     amLODIPine (NORVASC) 5 MG tablet Take 1 tablet (5 mg total) by mouth once daily. 90 tablet 3        Review of Systems   Unable to perform ROS: Mental status change     Objective:     Vital Signs (Most Recent):  Temp: 98 °F (36.7 °C) (02/05/22 1501)  Pulse: 89 (02/05/22 1801)  Resp: (!) 27 (02/05/22 1745)  BP: (!) 144/85 (02/05/22 1745)  SpO2: 97 % (02/05/22 1801)    Vital Signs Range (Last 24H):  Temp:  [96.7 °F (35.9 °C)-98.1 °F (36.7 °C)]   Pulse:  []   Resp:  [20-30]   BP: ()/()   SpO2:  [89 %-100 %]     Physical Exam  Vitals and nursing note reviewed.    Constitutional:       General: He is in acute distress.      Appearance: He is ill-appearing. He is not diaphoretic.      Interventions: Nasal cannula in place.   HENT:      Head: Normocephalic and atraumatic.      Right Ear: External ear normal.      Left Ear: External ear normal.      Nose: Nose normal.   Eyes:      General: No scleral icterus.        Right eye: No discharge.         Left eye: No discharge.      Conjunctiva/sclera: Conjunctivae normal.   Cardiovascular:      Rate and Rhythm: Normal rate.   Pulmonary:      Effort: Respiratory distress present.      Comments: Increased work of breathing noted    Abdominal:      General: Abdomen is flat. There is no distension.   Musculoskeletal:      Right lower leg: No edema.      Left lower leg: No edema.   Skin:     General: Skin is warm and dry.   Neurological:      Mental Status: He is lethargic.      Motor: Weakness present.      Comments: Moaning, unable to follow commands  Minimal WD of bilat LE to pain  No WD of RUE to pain  WD of LUE to pain   Psychiatric:      Comments: Unable to assess mood or thought content due to patient's change in mental status         Neurological Exam:   *Exam limited due to patient's AMS  LOC: lethargic  Attention Span: poor  Language: Mute  Articulation: Mute/Anarthric  Orientation: unable to assess as patient nonverbal  Visual Fields: Blinks to threat BL  Pupils (CN II, III): PERRL  Sensation: Minimal WD to pain of bilat LE, no WD to pain in RUE, WD of LUE to pain      Laboratory:  CMP:   Recent Labs   Lab 02/05/22  0347   CALCIUM 8.2*   ALBUMIN 2.2*   PROT 4.8*      K 3.6   CO2 23   *   BUN 52*   CREATININE 1.2   ALKPHOS 90   ALT 24   AST 69*   BILITOT 1.7*     CBC:   Recent Labs   Lab 02/05/22  0347   WBC 12.90*   RBC 4.91   HGB 12.3*   HCT 38.8*      MCV 79*   MCH 25.1*   MCHC 31.7*     Lipid Panel: No results for input(s): CHOL, LDLCALC, HDL, TRIG in the last 168 hours.  Coagulation: No results for  input(s): PT, INR, APTT in the last 168 hours.  Hgb A1C: No results for input(s): HGBA1C in the last 168 hours.  TSH:   Recent Labs   Lab 02/03/22 2122   TSH 3.057       Diagnostic Results:      Brain/Vessel imaging:  MRI Brain W WO 2/5/2022  Numerous regions of diffusion restriction in the supratentorial parenchyma suggestive of multifocal acute embolic infarcts.     Small acute infarction in the right inferior cerebellum.     Generalized volume loss and microvascular ischemic change.    CTH WO 2/3/2022  No acute intraparenchymal hemorrhage or major vascular distribution infarction.  Additional evaluation, as clinically warranted.     Generalized cerebral volume loss and chronic ischemic microvascular changes.    Cardiac Evaluation:   TTE 2/4/2022  · The left ventricle is normal in size with severely decreased systolic function. The estimated ejection fraction is 20%.  · Normal right ventricular size with moderately to severely reduced right ventricular systolic function.  · Left ventricular diastolic dysfunction.  · Biatrial enlargement.  · The ascending aorta is mildly dilated.  · Mild mitral regurgitation.  · Mild tricuspid regurgitation.  · The estimated PA systolic pressure is 51 mmHg.  · Intermediate central venous pressure (8 mmHg).

## 2022-02-06 NOTE — PROGRESS NOTES
[facility] - Intensive Care ([facility]  Vascular Neurology  Comprehensive Stroke Center  Progress Note    Assessment/Plan:     Acute arterial ischemic stroke, multifocal, multiple vascular territories  Patient is an 87 yo M with PMHx of HTN, Afib, COVID and HLD who was admitted for respiratory failure. Patient encephalopathic on admission with ferritin 877, , ,lactate 3.9 and COVID+. As part of encephalopathic workup, CTH(2/3) was obtained which was negative. TSH wnl and LP performed was unremarkable. This morning, patient noted to be lethargic with change in mental status so MRI Brain W WO was obtained. MRI with acute infarcts bilaterally(R temporal, L frontal, bilat occipital, R inferior cerebellum). Vascular Neurology consulted for imaging findings. No tPA OOW. No IR as symptoms not concerning for LVO at this time.  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 Green.  -Recommend CTA Head and Neck or MRA to assess vasculature. Continue AC.    Antithrombotics for secondary stroke prevention: On heparin gtt    Statins for secondary stroke prevention and hyperlipidemia, if present:   Statins: Atorvastatin- 40 mg daily    Aggressive risk factor modification: HTN, HLD, A-Fib, EF 20%     Rehab efforts: The patient has been evaluated by a stroke team provider and the therapy needs have been fully considered based off the presenting complaints and exam findings. The following therapy evaluations are needed: PT evaluate and treat, OT evaluate and treat, SLP evaluate and treat    Diagnostics ordered/pending: CTA Head to assess vasculature , CTA Neck/Arch to assess vasculature, MRA head to assess vasculature, MRA neck/arch to assess vasculature    VTE prophylaxis: Mechanical prophylaxis: Place SCDs  None: Reason for No Pharmacological VTE Prophylaxis: Currently on anticoagulation    BP parameters: Infarct: No intervention, SBP  <220        Mixed hyperlipidemia  Stroke RF  Recommend lipid panel and atorvastatin 40  Goal LDL<70    A-fib  Stroke RF  On heparin gtt    Pneumonia due to COVID-19 virus  Stroke RF  Hypercoagulable state  On high flow nasal cannula  On baricitinib, remdesivir and dexamethasone        HTN (hypertension), benign  Stroke RF  Acute infarct on imaging, recommend SBP<220         2/6/2022-R hemiparesis, moving left side spontaneously.       STROKE DOCUMENTATION        NIH Scale:  1a. Level of Consciousness: 2-->Not alert, requires repeated stimulation to attend, or is obtunded and requires strong or painful stimulation to make movements (not stereotyped)  1b. LOC Questions: 2-->Answers neither question correctly  1c. LOC Commands: 2-->Performs neither task correctly  2. Best Gaze: 1-->Partial gaze palsy, gaze is abnormal in one or both eyes, but forced deviation or total gaze paresis is not present  3. Visual: 0-->No visual loss  4. Facial Palsy: 1-->Minor paralysis (flattened nasolabial fold, asymmetry on smiling)  5a. Motor Arm, Left: 0-->No drift, limb holds 90 (or 45) degrees for full 10 secs  5b. Motor Arm, Right: 4-->No movement  6a. Motor Leg, Left: 0-->No drift, leg holds 30 degree position for full 5 secs  6b. Motor Leg, Right: 4-->No movement  7. Limb Ataxia: 0-->Absent  8. Sensory: 1-->Mild-to-moderate sensory loss, patient feels pinprick is less sharp or is dull on the affected side, or there is a loss of superficial pain with pinprick, but patient is aware of being touched  9. Best Language: 3-->Mute, global aphasia, no usable speech or auditory comprehension  10. Dysarthria: 2-->Severe dysarthria, patients speech is so slurred as to be unintelligible in the absence of or out of proportion to any dysphasia, or is mute/anarthric  11. Extinction and Inattention (formerly Neglect): 0-->No abnormality  Total (NIH Stroke Scale): 22       Modified [place] Score: 0  Andi Coma Scale:    ABCD2 Score:    [id number]  Score:   HAS -BLED Score:   ICH Score:   Hunt & Ledezma Classification:      Hemorrhagic change of an Ischemic Stroke: Does this patient have an ischemic stroke with hemorrhagic changes? No     Neurologic Chief Complaint: R hemiparesis    Subjective:     Interval History: Patient is seen for follow-up neurological assessment and treatment recommendations: R hemiparesis. MRI with embolic infarcts. On heparin gtt.     HPI, Past Medical, Family, and Social History remains the same as documented in the initial encounter.     Review of Systems   Unable to perform ROS: Patient nonverbal     Scheduled Meds:   albuterol  2 puff Inhalation Q8H    baricitinib  2 mg Oral Daily    dexamethasone  6 mg Intravenous Daily    levothyroxine  50 mcg Oral Daily    multivitamin  1 tablet Oral Daily    mupirocin   Nasal BID    piperacillin-tazobactam (ZOSYN) IVPB  4.5 g Intravenous Q8H    remdesivir infusion  100 mg Intravenous Daily    sodium chloride 0.9%  250 mL Intravenous Once     Continuous Infusions:   dexmedetomidine (PRECEDEX) infusion 0.2 mcg/kg/hr (02/06/22 1049)    heparin (porcine) in D5W 12 Units/kg/hr (02/06/22 1207)    NORepinephrine bitartrate-D5W 0 mcg/kg/min (02/05/22 1800)     PRN Meds:acetaminophen, benzonatate, dextrose 10%, dextrose 10%, glucagon (human recombinant), glucose, glucose, midazolam, morphine, naloxone, sodium chloride 0.9%    Objective:     Vital Signs (Most Recent):  Temp: 98 °F (36.7 °C) (02/06/22 1200)  Pulse: 98 (02/06/22 1200)  Resp: 18 (02/06/22 1200)  BP: (!) 149/74 (02/06/22 1200)  SpO2: 96 % (02/06/22 1423)  BP Location: Right arm    Vital Signs Range (Last 24H):  Temp:  [96.7 °F (35.9 °C)-98.5 °F (36.9 °C)]   Pulse:  []   Resp:  [14-44]   BP: ()/()   SpO2:  [88 %-97 %]   BP Location: Right arm    Physical Exam  Vitals and nursing note reviewed.   Constitutional:       Appearance: He is ill-appearing.   HENT:      Head: Normocephalic and atraumatic.      Nose: Nose  normal.   Eyes:      Conjunctiva/sclera: Conjunctivae normal.      Comments: R gaze   Cardiovascular:      Rate and Rhythm: Normal rate.   Pulmonary:      Effort: Respiratory distress present.   Musculoskeletal:      Right lower leg: No edema.      Left lower leg: No edema.   Skin:     General: Skin is warm and dry.   Neurological:      Mental Status: He is lethargic.      Comments: R facial droop  Moaning, unable to follow commands  R hemiparesis  Moving L side spontaneuosly         Neurological Exam:   *Exam limited due to patient's AMS  LOC: lethargic  Attention Span: poor  Language: Mute  Articulation: Mute/Anarthric  Orientation: unable to assess as patient nonverbal  R gaze preference  Motor: R hemiparesis, moving LUE/LLE spontaneously      Laboratory:  CMP:   Recent Labs   Lab 02/06/22  0400   CALCIUM 8.6*   ALBUMIN 2.4*   PROT 5.9*   *   K 3.9   CO2 21*   *   BUN 69*   CREATININE 1.6*   ALKPHOS 150*   ALT 28   AST 81*   BILITOT 1.9*     CBC:   Recent Labs   Lab 02/06/22  1100   WBC 18.36*   RBC 5.31   HGB 13.2*   HCT 42.3      MCV 80*   MCH 24.9*   MCHC 31.2*     Lipid Panel:   Recent Labs   Lab 02/06/22  0400   CHOL 97*   LDLCALC 58.6*   HDL 25*   TRIG 67     Coagulation:   Recent Labs   Lab 02/06/22  1100   INR 1.6*   APTT 30.6     Platelet Aggregation Study: No results for input(s): PLTAGG, PLTAGINTERP, PLTAGREGLACO, ADPPLTAGGREG in the last 168 hours.  Hgb A1C:   Recent Labs   Lab 02/06/22  0400   HGBA1C 6.0*     TSH:   Recent Labs   Lab 02/03/22  2122   TSH 3.057       Diagnostic Results        Brain/Vessel imaging:  MRI Brain W WO 2/5/2022  Numerous regions of diffusion restriction in the supratentorial parenchyma suggestive of multifocal acute embolic infarcts.     Small acute infarction in the right inferior cerebellum.     Generalized volume loss and microvascular ischemic change.     CTH WO 2/3/2022  No acute intraparenchymal hemorrhage or major vascular distribution infarction.   Additional evaluation, as clinically warranted.     Generalized cerebral volume loss and chronic ischemic microvascular changes.     Cardiac Evaluation:   TTE 2/4/2022  · The left ventricle is normal in size with severely decreased systolic function. The estimated ejection fraction is 20%.  · Normal right ventricular size with moderately to severely reduced right ventricular systolic function.  · Left ventricular diastolic dysfunction.  · Biatrial enlargement.  · The ascending aorta is mildly dilated.  · Mild mitral regurgitation.  · Mild tricuspid regurgitation.  · The estimated PA systolic pressure is 51 mmHg.  · Intermediate central venous pressure (8 mmHg).        Kaity Castrejon PA-C  Comprehensive Stroke Center  Department of Vascular Neurology   [facility] - Intensive Care ([place] [id number])

## 2022-02-07 NOTE — ASSESSMENT & PLAN NOTE
--CT head neg for acute intracranial abnormality  --LP unremarkable  --TSH WNL  --MRI showing acute embolic infarcts, vascular neuro consulted, recommended CTA or MRA however oxygen requirement increasig and OSWALDO, so holding for now.   --Precedex gtt for acute agitation

## 2022-02-07 NOTE — CONSULTS
Palliative Care consult received.  Chart reviewed and discussed with primary team.     Advanced Care Planning  - No advanced care documents received.  - Legal next of kin for decision making is mert's wife Jamilah Samuel.    - Mr. Samuel is not decisional at this time.  - As per conversation with daughterRima [phone], wife makes decisions with assistance from her daughters.    - Rima is a RN and had been designated as the family spokes person.  - No previous discussions regarding advanced care planning or goals of care have been held.  - Patient remains full code and current plan of care to be continued.     Recommendations  - Palliative medicine has requested to have family conference.  DaughterRima will coordinate with family for best time.  Will contact pal med.  Pal med contact information provided.    - Patient's wife will require Bulgarian .     Pal med will continue to follow.       20 mins spent in advanced care planning   -

## 2022-02-07 NOTE — CARE UPDATE
Team spoke with patient's daughter, Rima, at bedside. Discussed current condition, status, plan and concern for overall prognosis. She verbalized understanding. Daughter stated that palliative had stopped by, and that they will be working to arrange a family meeting.     Kumar Marks, DO  PGY-1  [facility]

## 2022-02-07 NOTE — ASSESSMENT & PLAN NOTE
--Covid 19 positive on 1/26. Patient is not vaccinated.   --continue dexamethasone, remdesivir, received tocilizumab  --On full dose anticoagulation Given OSWALDO switched from therapeutic lovenox to Heparin  --currently requiring 15L HFNC; wean as tolerated, however anticipate that his FiO2 requirement will likely worsen given typical disease trajectory

## 2022-02-07 NOTE — ASSESSMENT & PLAN NOTE
Patient Cr increasing, from 1/0 to 1.6 now. Likely contrast induced or pre renal.    - avoid nephrotoxic agents  -renally dose medications  - Holding CTA head and neck  - DC vanc  - switched therapeutic lovenox to heparin gtt given worsening kidney function  - free water

## 2022-02-07 NOTE — SUBJECTIVE & OBJECTIVE
Interval History/Significant Events: Patient had NAEON, continues to be non-communicative with intermittent moaning. Afebrile, VSS on 15 L High flow NC. NG was placed successfully.     Review of Systems   Unable to perform ROS: Mental status change   Respiratory: Positive for shortness of breath.    Psychiatric/Behavioral: Positive for confusion.     Objective:     Vital Signs (Most Recent):  Temp: 100.1 °F (37.8 °C) (02/07/22 0701)  Pulse: 78 (02/07/22 0900)  Resp: 18 (02/07/22 0900)  BP: 126/69 (02/07/22 0900)  SpO2: 97 % (02/07/22 0900) Vital Signs (24h Range):  Temp:  [95.9 °F (35.5 °C)-100.1 °F (37.8 °C)] 100.1 °F (37.8 °C)  Pulse:  [72-98] 78  Resp:  [10-32] 18  SpO2:  [90 %-98 %] 97 %  BP: ()/(54-74) 126/69   Weight: 69.9 kg (154 lb)  Body mass index is 26.43 kg/m².      Intake/Output Summary (Last 24 hours) at 2/7/2022 0938  Last data filed at 2/7/2022 0800  Gross per 24 hour   Intake 1048.67 ml   Output 800 ml   Net 248.67 ml       Physical Exam  Vitals and nursing note reviewed.   Constitutional:       Appearance: He is normal weight. He is ill-appearing. He is not toxic-appearing.   HENT:      Head: Normocephalic and atraumatic.      Nose: Nose normal.      Mouth/Throat:      Mouth: Mucous membranes are dry.   Eyes:      General: No scleral icterus.     Conjunctiva/sclera: Conjunctivae normal.   Cardiovascular:      Rate and Rhythm: Normal rate. Rhythm irregular.   Pulmonary:      Comments: Intermittent pursed lip breathing  Abdominal:      General: Abdomen is flat. There is no distension.      Palpations: Abdomen is soft.      Tenderness: There is no abdominal tenderness. There is no guarding.   Musculoskeletal:         General: No swelling, tenderness or deformity. Normal range of motion.      Cervical back: Normal range of motion.      Right lower leg: No edema.      Left lower leg: No edema.   Skin:     General: Skin is warm and dry.   Neurological:      Mental Status: He is alert. He is  disoriented.      GCS: GCS eye subscore is 4. GCS verbal subscore is 3. GCS motor subscore is 5.      Comments: Moves all extremities. Mental status precludes comprehensive neuro exam.    Psychiatric:         Speech: He is noncommunicative.         Behavior: Behavior is agitated.         Cognition and Memory: Cognition is impaired.         Vents:     Lines/Drains/Airways     Drain                 Urethral Catheter 02/04/22 0504 Straight-tip 18 Fr. 3 days          Peripheral Intravenous Line                 Peripheral IV - Single Lumen 02/05/22 2230 20 G Left Antecubital 1 day         Peripheral IV - Single Lumen 02/07/22 0138 18 G Anterior;Left Wrist <1 day              Significant Labs:    CBC/Anemia Profile:  Recent Labs   Lab 02/06/22  0400 02/06/22  1100 02/07/22  0444   WBC 17.81* 18.36* 10.77   HGB 13.8* 13.2* 12.2*   HCT 44.7 42.3 40.3    178 101*   MCV 80* 80* 82   RDW 17.5* 17.3* 17.0*        Chemistries:  Recent Labs   Lab 02/05/22  2138 02/06/22  0400 02/07/22  0444   * 150* 154*   K 3.9 3.9 4.1   * 115* 121*   CO2 21* 21* 17*   BUN 60* 69* 88*   CREATININE 1.4 1.6* 1.9*   CALCIUM 8.6* 8.6* 8.0*   ALBUMIN  --  2.4* 2.0*   PROT  --  5.9* 5.0*   BILITOT  --  1.9* 1.8*   ALKPHOS  --  150* 91   ALT  --  28 25   AST  --  81* 69*   MG 2.4 2.4 2.5   PHOS  --  4.1 4.1       All pertinent labs within the past 24 hours have been reviewed.    Significant Imaging:  I have reviewed all pertinent imaging results/findings within the past 24 hours.

## 2022-02-07 NOTE — ASSESSMENT & PLAN NOTE
Patient with Hypoxic Respiratory failure which is Acute.  he is not on home oxygen. Supplemental oxygen was provided and noted-  .   Signs/symptoms of respiratory failure include- tachypnea and increased work of breathing. Contributing diagnoses includes - CHF, Pneumonia and Covid-19 Labs and images were reviewed. Patient Has recent ABG, which has been reviewed. Will treat underlying causes and adjust management of respiratory failure as follows-     --Likely multifactorial in setting of Covid-19 infection and suspected HF (elevated BNP with reduced LVEF on bedside echo)  --Continue tx for Covid (see above); Added Baricitinib (2/5), but was NPO without NG so received tocilzumab  --x1 dose lasix on 2/4 with good UOP  --lower suspicion for superimposed bacterial infection (no leukocytosis and neg procal), however was on broad spectrum antibiotics vanc and zosyn. Given worsening kidney infection and low suspicion of bacterial infection without positive BC vanc was dc'd.   --f/u blood and sputum cx  --currently requiring 15L HFNC; wean as tolerated    TTE 2/4/22:  · The left ventricle is normal in size with severely decreased systolic function. The estimated ejection fraction is 20%.  · Normal right ventricular size with moderately to severely reduced right ventricular systolic function.  · Left ventricular diastolic dysfunction.  · Biatrial enlargement.  · The ascending aorta is mildly dilated.  · Mild mitral regurgitation.  · Mild tricuspid regurgitation.  · The estimated PA systolic pressure is 51 mmHg.  · Intermediate central venous pressure (8 mmHg).

## 2022-02-07 NOTE — PLAN OF CARE
CMICU DAILY GOALS       A: Awake    RASS: Goal - RASS Goal: -1-->drowsy  Actual - RASS (Martin Agitation-Sedation Scale): 1-->restless   Restraint necessity: Clinical Justification: Removing medical devices,Climbing out of bed,Treatment Interference  B: Breathe   SBT: Not intubated   C: Coordinate A & B, analgesics/sedatives   Pain: managed    SAT: Not intubated  D: Delirium   CAM-ICU: Overall CAM-ICU: Positive  E: Early(intubated/ Progressive (non-intubated) Mobility   MOVE Screen: Fail   Activity: Activity Management: Patient unable to perform activities  FAS: Feeding/Nutrition   Diet order: Diet/Nutrition Received: NPO,    T: Thrombus   DVT prophylaxis: VTE Required Core Measure: (SCDs) Sequential compression device initiated/maintained,Pharmacological prophylaxis initiated/maintained  H: HOB Elevation   Head of Bed (HOB) Positioning: HOB elevated  U: Ulcer Prophylaxis   GI: yes  G: Glucose control   managed    S: Skin   Bathing/Skin Care: back care,dressed/undressed,electrode patches/site rotation,incontinence care,linen changed  Device Skin Pressure Protection: pressure points protected,absorbent pad utilized/changed  Pressure Reduction Devices: other (see comments)  Pressure Reduction Techniques: other (see comments)  Skin Protection: other (see comments)  B: Bowel Function   constipation   I: Indwelling Catheters   Valenzuela necessity:      Urethral Catheter 02/04/22 0504 Straight-tip 18 Fr.-Reason for Continuing Urinary Catheterization: Critically ill in ICU and requiring hourly monitoring of intake/output   CVC necessity: Yes  D: De-escalation Antibiotics   Yes    Family/Goals of care/Code Status   Code Status: Full Code    24H Vital Sign Range  Temp:  [95.9 °F (35.5 °C)-98.2 °F (36.8 °C)]   Pulse:  []   Resp:  [10-32]   BP: ()/()   SpO2:  [90 %-98 %]      Shift Events        Pt screamed and moaned on and off through out the night, with mild to little relief with mediations used. Md and   notified of pt situation.VS and assessment per flow sheet, concerns addressed, will continue to monitor.    Giuliano Abdi

## 2022-02-07 NOTE — PROGRESS NOTES
"[facility] ([place] [address])  Critical Care Medicine  Progress Note    Patient Name: Lalo Samuel  MRN: [id number]  Admission Date: 2/3/2022  Hospital Length of Stay: 4 days  Code Status: Full Code  Attending Provider: Celena Cai MD  Primary Care Provider: SHARRON Padilla MD   Principal Problem: Acute hypoxemic respiratory failure    Subjective:     HPI:  Mr. Samuel is a 87 yo M w/ a history of HTN and HLD who presents today for SOB. History was obtained from patient's daughter and ER records due to patient being acutely encephalopathic. Daughter states patient tested COVID-19 (+) 1/26. At time of positive test, he endorsed cough, congestion, post sinus drip. Symptoms improved until approximately 3 days prior when he began having increased SOB and fatigue. Family also reports symptoms consistent with hypoactive delirium (non-verbal, "blank" stare), as well as decreased oral intake and a few episodes of diarrhea. Patient's family called EMS twice but patient refused. Today, patient became acutely confused and agitated with labored breathing which prompted family to call EMS again. Of note patient is not vaccinated against COVID-19. He lives with his wife (who is vaccinated) and daughter who is not vaccinated. At baseline, he carries out ADLs independently.      In the ED, patient tachycardic, tachypneic but afebrile. Initially hypoxic w/ improved SpO2 94% on 5L NC. On examination, patient ill appearing, extremely agitated, and w/ increased work of breathing - using accessory muscles and intermittent pursed lip breathing. Labs notable for increased inflammatory markers - ferritin 877, , . Lactate 3.9. No leukocytosis, procal wnl. COVID-19 (+). CXR w/ significant patchy b/l airspace opacities concerning for post viral PNA vs pulmonary edema. CTH (-). Trop 0.075. EKG showing Afib w/ RVR. Received 1 x IV metoprolol 5mg w/ improvement of HR to ~110. Patient received 1.5L IVFs in the " ED, broad spectrum abx w/ vanc and zosyn and 1 x IV dexamethasone 6mg. He was admitted to hospital medicine for further management; however, MICU consulted for uptrending lactate and acute hypoxemic respiratory failure.    MICU Consulted for Acute Hypoxemic Respiratory Failure in the setting of COVID-19          Hospital/ICU Course:  Admitted to MICU for AMS and acute hypoxic respiratory failure 2/2 COVID. CTH unremarkable. LP with no signs of infection. Procal negative. Placed on COVID PNA protocol. With Remdesivir, Dexamethasone, and Baricitinib. CT Chest with diffuse patchy ground glass and consolidation. On broad spectrum Vanc and Zosyn. MRI Brain showing multiple acute embolic infarcts, vascular neuro consulted recommending CTA head and neck, however, patient has OSWALDO (likely 2/2 to contrast or pre renal) and increasing oxygen requirements, holding off for now. Plan for palliative consult, echo w/bubble.       Interval History/Significant Events: Patient had NAEON, continues to be non-communicative with intermittent moaning. Afebrile, VSS on 15 L High flow NC. NG was placed successfully.     Review of Systems   Unable to perform ROS: Mental status change   Respiratory: Positive for shortness of breath.    Psychiatric/Behavioral: Positive for confusion.     Objective:     Vital Signs (Most Recent):  Temp: 100.1 °F (37.8 °C) (02/07/22 0701)  Pulse: 78 (02/07/22 0900)  Resp: 18 (02/07/22 0900)  BP: 126/69 (02/07/22 0900)  SpO2: 97 % (02/07/22 0900) Vital Signs (24h Range):  Temp:  [95.9 °F (35.5 °C)-100.1 °F (37.8 °C)] 100.1 °F (37.8 °C)  Pulse:  [72-98] 78  Resp:  [10-32] 18  SpO2:  [90 %-98 %] 97 %  BP: ()/(54-74) 126/69   Weight: 69.9 kg (154 lb)  Body mass index is 26.43 kg/m².      Intake/Output Summary (Last 24 hours) at 2/7/2022 0988  Last data filed at 2/7/2022 0800  Gross per 24 hour   Intake 1048.67 ml   Output 800 ml   Net 248.67 ml       Physical Exam  Vitals and nursing note reviewed.    Constitutional:       Appearance: He is normal weight. He is ill-appearing. He is not toxic-appearing.   HENT:      Head: Normocephalic and atraumatic.      Nose: Nose normal.      Mouth/Throat:      Mouth: Mucous membranes are dry.   Eyes:      General: No scleral icterus.     Conjunctiva/sclera: Conjunctivae normal.   Cardiovascular:      Rate and Rhythm: Normal rate. Rhythm irregular.   Pulmonary:      Comments: Intermittent pursed lip breathing  Abdominal:      General: Abdomen is flat. There is no distension.      Palpations: Abdomen is soft.      Tenderness: There is no abdominal tenderness. There is no guarding.   Musculoskeletal:         General: No swelling, tenderness or deformity. Normal range of motion.      Cervical back: Normal range of motion.      Right lower leg: No edema.      Left lower leg: No edema.   Skin:     General: Skin is warm and dry.   Neurological:      Mental Status: He is alert. He is disoriented.      GCS: GCS eye subscore is 4. GCS verbal subscore is 3. GCS motor subscore is 5.      Comments: Moves all extremities. Mental status precludes comprehensive neuro exam.    Psychiatric:         Speech: He is noncommunicative.         Behavior: Behavior is agitated.         Cognition and Memory: Cognition is impaired.         Vents:     Lines/Drains/Airways     Drain                 Urethral Catheter 02/04/22 0504 Straight-tip 18 Fr. 3 days          Peripheral Intravenous Line                 Peripheral IV - Single Lumen 02/05/22 2230 20 G Left Antecubital 1 day         Peripheral IV - Single Lumen 02/07/22 0138 18 G Anterior;Left Wrist <1 day              Significant Labs:    CBC/Anemia Profile:  Recent Labs   Lab 02/06/22  0400 02/06/22  1100 02/07/22  0444   WBC 17.81* 18.36* 10.77   HGB 13.8* 13.2* 12.2*   HCT 44.7 42.3 40.3    178 101*   MCV 80* 80* 82   RDW 17.5* 17.3* 17.0*        Chemistries:  Recent Labs   Lab 02/05/22  2138 02/06/22  0400 02/07/22  0444   * 150*  154*   K 3.9 3.9 4.1   * 115* 121*   CO2 21* 21* 17*   BUN 60* 69* 88*   CREATININE 1.4 1.6* 1.9*   CALCIUM 8.6* 8.6* 8.0*   ALBUMIN  --  2.4* 2.0*   PROT  --  5.9* 5.0*   BILITOT  --  1.9* 1.8*   ALKPHOS  --  150* 91   ALT  --  28 25   AST  --  81* 69*   MG 2.4 2.4 2.5   PHOS  --  4.1 4.1       All pertinent labs within the past 24 hours have been reviewed.    Significant Imaging:  I have reviewed all pertinent imaging results/findings within the past 24 hours.      ABG  Recent Labs   Lab 02/04/22  2136   PH 7.399   PO2 60*   PCO2 37.1   HCO3 23.0*   BE -2     Assessment/Plan:     Neuro  Acute arterial ischemic stroke, multifocal, multiple vascular territories  Patient with new onset a-fib, covid positive, acutely encephalopathic. MRI 2/5 revealed findings suggestive of multifocal acute embolic infarcts, as well as a small acute infarction in the right inferior cerebellum. Vascular neuro consulted, recommended CTA head and neck. However, patient has had increasing oxygen requirements as well as developed an OSWALDO. Spoke with vascular neuro and will hold on CTA for now.     -BP <220  - continue full AC, switched from lovenox to heparin gtt with worsening kidney function       Acute metabolic encephalopathy    --CT head neg for acute intracranial abnormality  --LP unremarkable  --TSH WNL  --MRI showing acute embolic infarcts, vascular neuro consulted, recommended CTA or MRA however oxygen requirement increasig and OSWALDO, so holding for now.   --Precedex gtt for acute agitation       Pulmonary  * Acute hypoxemic respiratory failure  Patient with Hypoxic Respiratory failure which is Acute.  he is not on home oxygen. Supplemental oxygen was provided and noted-  .   Signs/symptoms of respiratory failure include- tachypnea and increased work of breathing. Contributing diagnoses includes - CHF, Pneumonia and Covid-19 Labs and images were reviewed. Patient Has recent ABG, which has been reviewed. Will treat underlying  causes and adjust management of respiratory failure as follows-     --Likely multifactorial in setting of Covid-19 infection and suspected HF (elevated BNP with reduced LVEF on bedside echo)  --Continue tx for Covid (see above); Added Baricitinib (2/5), but was NPO without NG so received tocilzumab  --x1 dose lasix on 2/4 with good UOP  --lower suspicion for superimposed bacterial infection (no leukocytosis and neg procal), however was on broad spectrum antibiotics vanc and zosyn. Given worsening kidney infection and low suspicion of bacterial infection without positive BC vanc was dc'd.   --f/u blood and sputum cx  --currently requiring 15L HFNC; wean as tolerated    TTE 2/4/22:  · The left ventricle is normal in size with severely decreased systolic function. The estimated ejection fraction is 20%.  · Normal right ventricular size with moderately to severely reduced right ventricular systolic function.  · Left ventricular diastolic dysfunction.  · Biatrial enlargement.  · The ascending aorta is mildly dilated.  · Mild mitral regurgitation.  · Mild tricuspid regurgitation.  · The estimated PA systolic pressure is 51 mmHg.  · Intermediate central venous pressure (8 mmHg).        Cardiac/Vascular  A-fib  --new onset; suspect secondary to acute illness  --continue full dose anticoagulation  --low threshold for initiation of BB if in RVR    TTE 2/4/22:  · The left ventricle is normal in size with severely decreased systolic function. The estimated ejection fraction is 20%.  · Normal right ventricular size with moderately to severely reduced right ventricular systolic function.  · Left ventricular diastolic dysfunction.  · Biatrial enlargement.  · The ascending aorta is mildly dilated.  · Mild mitral regurgitation.  · Mild tricuspid regurgitation.  · The estimated PA systolic pressure is 51 mmHg.  · Intermediate central venous pressure (8 mmHg).        HTN (hypertension), benign  --Home amlodipine currently on  hold  --Consider starting BB for rate control if pt develops RVR given new onset a fib; would also benefit from GDMT given reduced EF  --given elevated BNP and cxr concerning for pulm edema, recieved lasix x1 dose    Renal/  OSWALDO (acute kidney injury)  Patient Cr increasing, from 1/0 to 1.6 now. Likely contrast induced or pre renal.    - avoid nephrotoxic agents  -renally dose medications  - Holding CTA head and neck  - DC vanc  - switched therapeutic lovenox to heparin gtt given worsening kidney function  - free water    Endocrine  Hypothyroidism  --TSH 3.057 (2/4)  --Continue home levothyroxine    Other  Elevated lactic acid level  --no obvious source of infection; no leukocytosis and neg procal  --DC vanc, continuing zosyn for now  --follow up blood (NGTD), CSF (NGTD) and sputum cx  --concern for possible RLE ischemia (extremity cool to touch and unable to doppler pulse) -> LE arterial US negative  --Lactate downtrending    Pneumonia due to COVID-19 virus  --Covid 19 positive on 1/26. Patient is not vaccinated.   --continue dexamethasone, remdesivir, received tocilizumab  --On full dose anticoagulation Given OSWALDO switched from therapeutic lovenox to Heparin  --currently requiring 15L HFNC; wean as tolerated, however anticipate that his FiO2 requirement will likely worsen given typical disease trajectory         Critical Care Daily Checklist:    A: Awake: RASS Goal/Actual Goal: RASS Goal: -1-->drowsy  Actual: Martin Agitation Sedation Scale (RASS): Restless   B: Spontaneous Breathing Trial Performed?     C: SAT & SBT Coordinated?                        D: Delirium: CAM-ICU Overall CAM-ICU: Positive   E: Early Mobility Performed? No   F: Feeding Goal:    Status:     Current Diet Order   Procedures    Diet NPO      AS: Analgesia/Sedation precedex   T: Thromboembolic Prophylaxis Heparin gtt   H: HOB > 300 Yes   U: Stress Ulcer Prophylaxis (if needed)    G: Glucose Control none   B: Bowel Function Stool Occurrence:  0   I: Indwelling Catheter (Lines & Valenzuela) Necessity Cath, PIV   D: De-escalation of Antimicrobials/Pharmacotherapies zosyn    Plan for the day/ETD Goals of care, wean O2 if tolerated    Code Status:  Family/Goals of Care: Full Code         Critical secondary to Patient has a condition that poses threat to life and bodily function: Severe Respiratory Distress and embolic stroke      Critical care was time spent personally by me on the following activities: development of treatment plan with patient or surrogate and bedside caregivers, discussions with consultants, evaluation of patient's response to treatment, examination of patient, ordering and performing treatments and interventions, ordering and review of laboratory studies, ordering and review of radiographic studies, pulse oximetry, re-evaluation of patient's condition. This critical care time did not overlap with that of any other provider or involve time for any procedures.     Kumar Marks, DO  Critical Care Medicine  [facility] ([facility])

## 2022-02-08 PROBLEM — E44.0 MODERATE MALNUTRITION: Status: ACTIVE | Noted: 2022-01-01

## 2022-02-08 PROBLEM — E87.0 HYPERNATREMIA: Status: ACTIVE | Noted: 2022-01-01

## 2022-02-08 NOTE — ASSESSMENT & PLAN NOTE
Patient Cr increasing, from 1.0 to 2.1 now. Likely contrast induced or pre renal.    - avoid nephrotoxic agents  -renally dose medications  - Holding CTA head and neck  - DC vanc  - switched therapeutic lovenox to heparin gtt given worsening kidney function  - free water 300 changed from qid to q4h 2/8/22

## 2022-02-08 NOTE — SUBJECTIVE & OBJECTIVE
Interval History:     Past Medical History:   Diagnosis Date    Basal cell carcinoma     right lat forehead, right lat eye, right chin, left nose     Dyslipidemia     Elevated PSA     Hypertension     Hypothyroid     Osteoarthritis of both knees        Past Surgical History:   Procedure Laterality Date    CATARACT EXTRACTION      PC IOL OU       Review of patient's allergies indicates:  No Known Allergies    Medications:  Continuous Infusions:   dexmedetomidine (PRECEDEX) infusion 1.4 mcg/kg/hr (02/08/22 0701)    heparin (porcine) in D5W 13 Units/kg/hr (02/08/22 0701)     Scheduled Meds:   albuterol  2 puff Inhalation Q8H    ampicillin-sulbactim (UNASYN) IVPB  3 g Intravenous Q12H    dexamethasone  6 mg Intravenous Daily    levothyroxine  50 mcg Oral Daily    multivitamin  1 tablet Oral Daily    mupirocin   Nasal BID    polyethylene glycol  17 g Oral Daily    remdesivir infusion  100 mg Intravenous Daily     PRN Meds:acetaminophen, benzonatate, dextrose 10%, dextrose 10%, glucagon (human recombinant), glucose, glucose, midazolam, morphine, naloxone, sodium chloride 0.9%    Family History     Problem Relation (Age of Onset)    Hypertension Mother, Father, Sister, Brother        Tobacco Use    Smoking status: Never Smoker    Smokeless tobacco: Never Used   Substance and Sexual Activity    Alcohol use: No    Drug use: No    Sexual activity: Not on file       Review of Systems   Unable to perform ROS: Acuity of condition     Objective:     Vital Signs (Most Recent):  Temp: 98.5 °F (36.9 °C) (02/08/22 0300)  Pulse: 68 (02/08/22 0701)  Resp: 19 (02/08/22 0701)  BP: 109/65 (02/08/22 0701)  SpO2: 100 % (02/08/22 0701) Vital Signs (24h Range):  Temp:  [96.7 °F (35.9 °C)-99 °F (37.2 °C)] 98.5 °F (36.9 °C)  Pulse:  [67-79] 68  Resp:  [14-34] 19  SpO2:  [87 %-100 %] 100 %  BP: (101-136)/(65-82) 109/65     Weight: 69.9 kg (154 lb)  Body mass index is 26.43 kg/m².    Physical Exam  Vitals and nursing note  reviewed.   Constitutional:       General: He is not in acute distress.     Appearance: He is ill-appearing.      Comments: encephalopathic   HENT:      Head: Normocephalic.      Nose:      Comments: Ng tube right nares  Cardiovascular:      Rate and Rhythm: Normal rate and regular rhythm.   Pulmonary:      Comments: High flow oxygen   Abdominal:      General: Bowel sounds are normal. There is no distension.   Genitourinary:     Comments: Indwelling urinary catheter  Musculoskeletal:      Cervical back: Normal range of motion.   Skin:     General: Skin is warm and dry.   Neurological:      Mental Status: He is disoriented.   Psychiatric:      Comments: Unable to assess         Review of Symptoms    Symptom Assessment (ESAS 0-10 Scale)  Pain:  0  Dyspnea:  0  Anxiety:  0  Nausea:  0  Depression:  0  Anorexia:  0  Fatigue:  0  Insomnia:  0  Restlessness:  0  Agitation:  0  Unable to complete assessment due to Acuity of condition         Pain Assessment in Advanced Demential Scale (PAINAD)   Breathing - Independent of vocalization:  1  Negative vocalization:  0  Facial expression:  0  Body language:  0  Consolability:  0  Total:  1    Modified Mitch Scale:  4    Performance Status:  40    Living Arrangements:  Lives with spouse    Psychosocial/Cultural: [place] from [place],  60 + years, 5 daughters, retired lives with wife and daughter    Spiritual:  F - Nikki and Belief:  [employer]   A - Address in Care:   following         Advance Care Planning   Advance Directives:   Living Will: No        Oral Declaration: No    LaPOST: No    Do Not Resuscitate Status: No    Medical Power of : No        Oral Declaration: No      Decision Making:  Family answered questions         Significant Labs: All pertinent labs within the past 24 hours have been reviewed.  CBC:   Recent Labs   Lab 02/08/22  0402   WBC 10.61   HGB 12.4*   HCT 41.0   MCV 80*   *     BMP:  Recent Labs   Lab 02/08/22  0402   *    *   K 3.7   *   CO2 19*   BUN 96*   CREATININE 2.1*   CALCIUM 7.6*   MG 2.7*     LFT:  Lab Results   Component Value Date    AST 57 (H) 02/08/2022    ALKPHOS 99 02/08/2022    BILITOT 1.5 (H) 02/08/2022     Albumin:   Albumin   Date Value Ref Range Status   02/08/2022 1.9 (L) 3.5 - 5.2 g/dL Final     Protein:   Total Protein   Date Value Ref Range Status   02/08/2022 4.9 (L) 6.0 - 8.4 g/dL Final     Lactic acid:   Lab Results   Component Value Date    LACTATE 1.8 02/05/2022    LACTATE 2.2 02/04/2022       Significant Imaging: I have reviewed all pertinent imaging results/findings within the past 24 hours.

## 2022-02-08 NOTE — PLAN OF CARE
Patient is disoriented X 4. Patient now on non rebreather, previously high flow nasal cannula due to oxygenation in high 80s. During provider rounds patient displayed signs of agitation. Provider prescribed quetiapine 25 mg tablet administered via NG tube, agitation resolved.  VS and assessment per flow sheet, patient progressing towards goals as tolerated, plan of care reviewed with patient, all concerns addressed, will continue plan of care per provider orders.     Problem: Adult Inpatient Plan of Care  Goal: Plan of Care Review  Outcome: Ongoing, Progressing  Flowsheets (Taken 2/8/2022 0451)  Plan of Care Reviewed With: patient  Goal: Patient-Specific Goal (Individualized)  Outcome: Ongoing, Progressing  Flowsheets (Taken 2/8/2022 0451)  Anxieties, Fears or Concerns: KEVIN  Individualized Care Needs: promote comofort  Patient-Specific Goals (Include Timeframe): wean oxygen, manage agitation with oral tablet of quetiapine  Goal: Optimal Comfort and Wellbeing  Outcome: Ongoing, Progressing     Problem: Restraint, Nonbehavioral (Nonviolent)  Goal: Absence of Harm or Injury  Outcome: Ongoing, Progressing    CMICU DAILY GOALS       A: Awake    RASS: Goal - RASS Goal: -2-->light sedation  Actual - RASS (Martin Agitation-Sedation Scale): -3-->moderate sedation   Restraint necessity: Clinical Justification: Removing medical devices  B: Breathe   SBT: Not intubated   C: Coordinate A & B, analgesics/sedatives   Pain:  Managed     SAT: Not intubated  D: Delirium   CAM-ICU: Overall CAM-ICU: Positive  E: Early(intubated/ Progressive (non-intubated) Mobility   MOVE Screen: Fail   Activity: Activity Management: Patient unable to perform activities  FAS: Feeding/Nutrition   Diet order: Diet/Nutrition Received: tube feeding,    T: Thrombus   DVT prophylaxis: VTE Required Core Measure: Pharmacological prophylaxis initiated/maintained  H: HOB Elevation   Head of Bed (HOB) Positioning: HOB at 30-45 degrees  U: Ulcer  Prophylaxis   GI: no  G: Glucose control   No     S: Skin   Bathing/Skin Care: bath, complete,dressed/undressed,electrode patches/site rotation,incontinence care,linen changed,back care  Device Skin Pressure Protection: absorbent pad utilized/changed,adhesive use limited,skin-to-skin areas padded  Pressure Reduction Devices: foam padding utilized,heel offloading device utilized,pressure-redistributing mattress utilized,specialty bed utilized  Pressure Reduction Techniques: weight shift assistance provided  Skin Protection: adhesive use limited,incontinence pads utilized,skin-to-skin areas padded,transparent dressing maintained,tubing/devices free from skin contact  B: Bowel Function   constipation   I: Indwelling Catheters   Valenzuela necessity:      Urethral Catheter 02/04/22 0504 Straight-tip 18 Fr.-Reason for Continuing Urinary Catheterization: Critically ill in ICU and requiring hourly monitoring of intake/output   CVC necessity: Yes  D: De-escalation Antibiotics   Yes    Family/Goals of care/Code Status   Code Status: Full Code    24H Vital Sign Range  Temp:  [96.7 °F (35.9 °C)-100.1 °F (37.8 °C)]   Pulse:  [68-81]   Resp:  [14-34]   BP: ()/(55-82)   SpO2:  [87 %-100 %]

## 2022-02-08 NOTE — CONSULTS
"[facility] - [facility] ([facility])  Adult Nutrition  Consult Note    SUMMARY     Recommendations    1. As tolerated, increase TF rate (of Impact Peptide) to 45 mL/hr - 1620 calories, 101 g of protein, 832 mL fluid.  2. Monitor renal function & change TF regimen to Novasource @ 35 mL/hr if necessary - 1680 calories & 76 g of protein.   3. RD to monitor & follow-up.    Goals: Meet % EEN, EPN by RD f/u date  Nutrition Goal Status: new  Communication of RD Recs: reviewed with RN    Assessment and Plan    Moderate malnutrition    Nutrition Problem:  Moderate Protein-Calorie Malnutrition  Malnutrition in the context of Acute Illness/Injury    Related to (etiology):  Inability to consume sufficient energy    Signs and Symptoms (as evidenced by):  Energy Intake: <75% of estimated energy requirement for > 7 days   Weight Loss: 5% x 1 month     Interventions(treatment strategy):  Collaboration of nutrition care w/ other providers  Enteral nutrition     Nutrition Diagnosis Status:  New     Malnutrition Assessment    Weight Loss (Malnutrition): 5% in 1 month  Energy Intake (Malnutrition): less than 75% for greater than 7 days     Reason for Assessment    Reason For Assessment: consult,new tube feeding  Diagnosis: other (see comments) (Resp. fx)  Relevant Medical History: HTN, HLD  Interdisciplinary Rounds: did not attend    General Information Comments: +COVID-19. NPO x 5 days; TFs initiated yesterday via NGT & pt tolerating thus far at trickle. Per H & P, pt w/ decreased appetite PTA. UBW: 163#, per chart review. RD unable to complete NFPE, however based on 5% weight loss & decreased energy intake PTA, RD feeels pt meets criteria for moderate malnutrition. Please see PES statement for details.  Nutrition Discharge Planning: Unable to determine    Nutrition/Diet History    Factors Affecting Nutritional Intake: NPO    Anthropometrics    Temp: 96 °F (35.6 °C)  Height: 5' 4" (162.6 cm)  Height (inches): 64 " in  Weight Method: Bed Scale  Weight: 69.9 kg (154 lb 1.6 oz)  Weight (lb): 154.1 lb  Ideal Body Weight (IBW), Male: 130 lb  % Ideal Body Weight, Male (lb): 118.54 %  BMI (Calculated): 26.4  BMI Grade: 25 - 29.9 - overweight  Usual Body Weight (UBW), k kg  % Usual Body Weight: 94.66  % Weight Change From Usual Weight: -5.54 %    Lab/Procedures/Meds    Pertinent Labs Reviewed: reviewed  Pertinent Labs Comments: Na 151, BUN 96, Creat 2.1, GFR 27.3, P 4.9, Bili 1.5  Pertinent Medications Reviewed: reviewed  Pertinent Medications Comments: Precedex, Heparin    Estimated/Assessed Needs    Weight Used For Calorie Calculations: 70 kg (154 lb 5.2 oz)     Energy Calorie Requirements (kcal): 1665 kcal/d  Energy Need Method: [place]-St Jeor (1.3 PAL)     Protein Requirements: 84-98 g/d (1.2-1.4 g/kg)  Weight Used For Protein Calculations: 70 kg (154 lb 5.2 oz)     Estimated Fluid Requirement Method: other (see comments) (Per MD or 1mL/kcal)  RDA Method (mL): 1665    Nutrition Prescription Ordered    Current Diet Order: NPO  Current Nutrition Support Formula Ordered: Impact Peptide 1.5  Current Nutrition Support Rate Ordered: 10 mL/hr    Evaluation of Received Nutrient/Fluid Intake    Enteral Calories (kcal): 360  Enteral Protein (gm): 23  Enteral (Free Water) Fluid (mL): 185    I/O: +1.6L since admit    Energy Calories Required: not meeting needs  Protein Required: not meeting needs  Fluid Required: other (see comments) (Per MD)    Comments: LBM: PTA    Tolerance: tolerating    Nutrition Risk    Level of Risk/Frequency of Follow-up: (1x/week)     Monitor and Evaluation    Food and Nutrient Intake: energy intake,food and beverage intake,enteral nutrition intake  Food and Nutrient Adminstration: diet order,enteral and parenteral nutrition administration  Physical Activity and Function: nutrition-related ADLs and IADLs  Anthropometric Measurements: weight,weight change  Biochemical Data, Medical Tests and Procedures:  glucose/endocrine profile,inflammatory profile,lipid profile,electrolyte and renal panel,gastrointestinal profile  Nutrition-Focused Physical Findings: overall appearance     Nutrition Follow-Up    RD Follow-up?: Yes

## 2022-02-08 NOTE — SUBJECTIVE & OBJECTIVE
Interval History:     Past Medical History:   Diagnosis Date    Basal cell carcinoma     right lat forehead, right lat eye, right chin, left nose     Dyslipidemia     Elevated PSA     Hypertension     Hypothyroid     Osteoarthritis of both knees        Past Surgical History:   Procedure Laterality Date    CATARACT EXTRACTION      PC IOL OU       Review of patient's allergies indicates:  No Known Allergies    Medications:  Continuous Infusions:   dexmedetomidine (PRECEDEX) infusion 1.4 mcg/kg/hr (02/08/22 1400)    heparin (porcine) in D5W 13 Units/kg/hr (02/08/22 1400)     Scheduled Meds:   albuterol  2 puff Inhalation Q8H    ampicillin-sulbactim (UNASYN) IVPB  3 g Intravenous Q12H    dexamethasone  6 mg Intravenous Daily    glycopyrrolate  0.2 mg Intravenous QID    levothyroxine  50 mcg Oral Daily    multivitamin  1 tablet Oral Daily    mupirocin   Nasal BID    polyethylene glycol  17 g Oral Daily    QUEtiapine  25 mg Per OG tube BID     PRN Meds:acetaminophen, benzonatate, dextrose 10%, dextrose 10%, glucagon (human recombinant), glucose, glucose, midazolam, morphine, naloxone, sodium chloride 0.9%    Family History     Problem Relation (Age of Onset)    Hypertension Mother, Father, Sister, Brother        Tobacco Use    Smoking status: Never Smoker    Smokeless tobacco: Never Used   Substance and Sexual Activity    Alcohol use: No    Drug use: No    Sexual activity: Not on file       Review of Systems   Unable to perform ROS: Acuity of condition     Objective:     Vital Signs (Most Recent):  Temp: 98.6 °F (37 °C) (02/08/22 1100)  Pulse: 84 (02/08/22 1400)  Resp: (!) 30 (02/08/22 1453)  BP: 125/62 (02/08/22 1400)  SpO2: (!) 89 % (02/08/22 1400) Vital Signs (24h Range):  Temp:  [96 °F (35.6 °C)-98.6 °F (37 °C)] 98.6 °F (37 °C)  Pulse:  [66-84] 84  Resp:  [14-44] 30  SpO2:  [87 %-100 %] 89 %  BP: ()/(57-94) 125/62     Weight: 69.9 kg (154 lb 1.6 oz)  Body mass index is 26.45  kg/m².    Physical Exam  Vitals and nursing note reviewed.   Constitutional:       General: He is not in acute distress.     Appearance: He is ill-appearing.      Comments: encephalopathic   HENT:      Head: Normocephalic.      Nose:      Comments: Ng tube right nares  Cardiovascular:      Rate and Rhythm: Normal rate and regular rhythm.   Pulmonary:      Comments: High flow oxygen   Abdominal:      General: Bowel sounds are normal. There is no distension.   Genitourinary:     Comments: Indwelling urinary catheter  Musculoskeletal:      Cervical back: Normal range of motion.   Skin:     General: Skin is warm and dry.   Neurological:      Mental Status: He is disoriented.   Psychiatric:      Comments: Unable to assess         Review of Symptoms    Symptom Assessment (ESAS 0-10 Scale)  Pain:  0  Dyspnea:  0  Anxiety:  0  Nausea:  0  Depression:  0  Anorexia:  0  Fatigue:  0  Insomnia:  0  Restlessness:  0  Agitation:  0  Unable to complete assessment due to Acuity of condition         Pain Assessment in Advanced Demential Scale (PAINAD)   Breathing - Independent of vocalization:  1  Negative vocalization:  0  Facial expression:  0  Body language:  0  Consolability:  0  Total:  1    Modified Mitch Scale:  4    Performance Status:  40    Living Arrangements:  Lives with spouse    Psychosocial/Cultural: [place] from [place],  60 + years, 5 daughters, retired lives with wife and daughter    Spiritual:  F - Nikki and Belief:  Islam   A - Address in Care:   following         Advance Care Planning   Advance Directives:   Living Will: No        Oral Declaration: No    LaPOST: No    Do Not Resuscitate Status: No    Medical Power of : No        Oral Declaration: No      Decision Making:  Family answered questions         Significant Labs: All pertinent labs within the past 24 hours have been reviewed.  CBC:   Recent Labs   Lab 02/08/22  0402   WBC 10.61   HGB 12.4*   HCT 41.0   MCV 80*   *      BMP:  Recent Labs   Lab 02/08/22  0402   *   *   K 3.7   *   CO2 19*   BUN 96*   CREATININE 2.1*   CALCIUM 7.6*   MG 2.7*     LFT:  Lab Results   Component Value Date    AST 57 (H) 02/08/2022    ALKPHOS 99 02/08/2022    BILITOT 1.5 (H) 02/08/2022     Albumin:   Albumin   Date Value Ref Range Status   02/08/2022 1.9 (L) 3.5 - 5.2 g/dL Final     Protein:   Total Protein   Date Value Ref Range Status   02/08/2022 4.9 (L) 6.0 - 8.4 g/dL Final     Lactic acid:   Lab Results   Component Value Date    LACTATE 1.8 02/05/2022    LACTATE 2.2 02/04/2022       Significant Imaging: I have reviewed all pertinent imaging results/findings within the past 24 hours.

## 2022-02-08 NOTE — ASSESSMENT & PLAN NOTE
Nutrition Problem:  Moderate Protein-Calorie Malnutrition  Malnutrition in the context of Acute Illness/Injury    Related to (etiology):  Inability to consume sufficient energy    Signs and Symptoms (as evidenced by):  Energy Intake: <75% of estimated energy requirement for > 7 days   Weight Loss: 5% x 1 month     Interventions(treatment strategy):  Collaboration of nutrition care w/ other providers  Enteral nutrition     Nutrition Diagnosis Status:  New

## 2022-02-08 NOTE — HPI
HPI obtained from chart review:       Mr. Samuel is a 89 yo gentleman with PMH of:  HTN and HLD.  He presented to [facility] with c/o shortness of breath.  In ED   Patient was found to be encephalopathic.  Daughter provides history.  As per daughter  COVID-19 (+) 1/26. Symptoms included:   cough, congestion, post sinus drip.     Symptoms improved and after three days experienced  increased SOB and fatigue. Family activated EMS twice, patient refused   treatment. On day of admit, patient became acutely confused and agitated which prompted family to call EMS again.      In the ED, patient tachycardic, tachypneic but afebrile. Initially hypoxic w/ improved SpO2 94% on 5L NC. On examination, patient ill appearing, extremely agitated, and w/ increased work of breathing - using accessory muscles. Labs notable for increased inflammatory markers - ferritin 877, , . Lactate 3.9. No leukocytosis, procal wnl. COVID-19 (+). CXR w/ significant patchy b/l airspace opacities concerning for post viral PNA vs pulmonary edema. CTH (-). Trop 0.075. EKG showing Afib w/ RVR. Received 1 x IV metoprolol 5mg w/ improvement of HR to ~110. Patient received 1.5L IVFs in the ED, broad spectrum abx w/ vanc and zosyn and 1 x IV dexamethasone 6mg. He was admitted to hospital medicine for further management and transferred to critical care for management of uptrending lactate and   acute hypoxemic respiratory failure.    Palliative medicine consulted for goals of care and advanced care planning

## 2022-02-08 NOTE — ASSESSMENT & PLAN NOTE
Palliative medicine consulted for end of life goals of care and hospice disussion    Mr. Samuel is an 87 yo gentleman admitted with COVID pneumonia and acute hypoxic respiratory failure receiving  remdesivir and dexamethazone.  Requiring high flow oxygen at 15 liters with Sats 88 - 92%.  Encephalopathic and unable  To participate in conversation.       Advance Care Planning     - No advanced care documents received.  - Legal next of kin for decision making is mert's wife Jamilah Samuel.    - Mr. Samuel is not decisional at this time.  - As per conversation with daughterRima [phone], wife makes decisions with assistance from her daughters.    - Rima is a RN and had been designated as the family spokes person.  - No previous discussions regarding advanced care planning or goals of care have been held.  - Patient remains full code and current plan of care to be continued.      Recommendations  - Palliative medicine has requested to have family conference.  DaughterRima will coordinate with family for best time.  Will contact pal med.  Pal med contact information provided.    - Patient's wife will require Arabic .   - Continue current plan of care    Pal med will continue to follow.      Primary team resident notified of the above recommendations

## 2022-02-08 NOTE — CONSULTS
[facility] - Int[facility] Care ([place] [address])  Palliative Medicine  Consult Note    Patient Name: Lalo Samuel  MRN: [id number]  Admission Date: 2/3/2022  Hospital Length of Stay: 5 days  Code Status: Full Code   Attending Provider: Celena Cai MD  Consulting Provider: ALEXANDRU North  Primary Care Physician: SHARRON Padilla MD  Principal Problem:Acute hypoxemic respiratory failure    Patient information was obtained from relative(s), past medical records and primary team.      Consults  Assessment/Plan:     Palliative care encounter  Palliative medicine consulted for end of life goals of care and hospice disussion    Mr. Samuel is an 87 yo gentleman admitted with COVID pneumonia and acute hypoxic respiratory failure receiving  remdesivir and dexamethazone.  Requiring high flow oxygen at 15 liters with Sats 88 - 92%.  Encephalopathic and unable  To participate in conversation.       Advance Care Planning     - No advanced care documents received.  - Legal next of kin for decision making is mert's wife Jamilah Samuel.    - Mr. Samuel is not decisional at this time.  - As per conversation with daughterRima [phone], wife makes decisions with assistance from her daughters.    - Rima is a RN and had been designated as the family spokes person.  - No previous discussions regarding advanced care planning or goals of care have been held.  - Patient remains full code and current plan of care to be continued.      Recommendations  - Palliative medicine has requested to have family conference.  DaughterRima will coordinate with family for best time.  Will contact pal med.  Pal med contact information provided.    - Patient's wife will require Romansh .   - Continue current plan of care    Pal med will continue to follow.      Primary team resident notified of the above recommendations               Thank you for your consult. I will follow-up with patient.  Please contact us if you have any additional questions.    Subjective:     HPI:   HPI obtained from chart review:       Mr. Samuel is a 87 yo gentleman with PMH of:  HTN and HLD.  He presented to [facility] with c/o shortness of breath.  In ED   Patient was found to be encephalopathic.  Daughter provides history.  As per daughter  COVID-19 (+) 1/26. Symptoms included:   cough, congestion, post sinus drip.     Symptoms improved and after three days experienced  increased SOB and fatigue. Family activated EMS twice, patient refused   treatment. On day of admit, patient became acutely confused and agitated which prompted family to call EMS again.      In the ED, patient tachycardic, tachypneic but afebrile. Initially hypoxic w/ improved SpO2 94% on 5L NC. On examination, patient ill appearing, extremely agitated, and w/ increased work of breathing - using accessory muscles. Labs notable for increased inflammatory markers - ferritin 877, , . Lactate 3.9. No leukocytosis, procal wnl. COVID-19 (+). CXR w/ significant patchy b/l airspace opacities concerning for post viral PNA vs pulmonary edema. CTH (-). Trop 0.075. EKG showing Afib w/ RVR. Received 1 x IV metoprolol 5mg w/ improvement of HR to ~110. Patient received 1.5L IVFs in the ED, broad spectrum abx w/ vanc and zosyn and 1 x IV dexamethasone 6mg. He was admitted to hospital medicine for further management and transferred to critical care for management of uptrending lactate and   acute hypoxemic respiratory failure.    Palliative medicine consulted for goals of care and advanced care planning            Hospital Course:  No notes on file    Interval History:     Past Medical History:   Diagnosis Date    Basal cell carcinoma     right lat forehead, right lat eye, right chin, left nose     Dyslipidemia     Elevated PSA     Hypertension     Hypothyroid     Osteoarthritis of both knees        Past Surgical History:   Procedure Laterality Date     CATARACT EXTRACTION      PC IOL OU       Review of patient's allergies indicates:  No Known Allergies    Medications:  Continuous Infusions:   dexmedetomidine (PRECEDEX) infusion 1.4 mcg/kg/hr (02/08/22 0701)    heparin (porcine) in D5W 13 Units/kg/hr (02/08/22 0701)     Scheduled Meds:   albuterol  2 puff Inhalation Q8H    ampicillin-sulbactim (UNASYN) IVPB  3 g Intravenous Q12H    dexamethasone  6 mg Intravenous Daily    levothyroxine  50 mcg Oral Daily    multivitamin  1 tablet Oral Daily    mupirocin   Nasal BID    polyethylene glycol  17 g Oral Daily    remdesivir infusion  100 mg Intravenous Daily     PRN Meds:acetaminophen, benzonatate, dextrose 10%, dextrose 10%, glucagon (human recombinant), glucose, glucose, midazolam, morphine, naloxone, sodium chloride 0.9%    Family History     Problem Relation (Age of Onset)    Hypertension Mother, Father, Sister, Brother        Tobacco Use    Smoking status: Never Smoker    Smokeless tobacco: Never Used   Substance and Sexual Activity    Alcohol use: No    Drug use: No    Sexual activity: Not on file       Review of Systems   Unable to perform ROS: Acuity of condition     Objective:     Vital Signs (Most Recent):  Temp: 98.5 °F (36.9 °C) (02/08/22 0300)  Pulse: 68 (02/08/22 0701)  Resp: 19 (02/08/22 0701)  BP: 109/65 (02/08/22 0701)  SpO2: 100 % (02/08/22 0701) Vital Signs (24h Range):  Temp:  [96.7 °F (35.9 °C)-99 °F (37.2 °C)] 98.5 °F (36.9 °C)  Pulse:  [67-79] 68  Resp:  [14-34] 19  SpO2:  [87 %-100 %] 100 %  BP: (101-136)/(65-82) 109/65     Weight: 69.9 kg (154 lb)  Body mass index is 26.43 kg/m².    Physical Exam  Vitals and nursing note reviewed.   Constitutional:       General: He is not in acute distress.     Appearance: He is ill-appearing.      Comments: encephalopathic   HENT:      Head: Normocephalic.      Nose:      Comments: Ng tube right nares  Cardiovascular:      Rate and Rhythm: Normal rate and regular rhythm.   Pulmonary:       Comments: High flow oxygen   Abdominal:      General: Bowel sounds are normal. There is no distension.   Genitourinary:     Comments: Indwelling urinary catheter  Musculoskeletal:      Cervical back: Normal range of motion.   Skin:     General: Skin is warm and dry.   Neurological:      Mental Status: He is disoriented.   Psychiatric:      Comments: Unable to assess         Review of Symptoms    Symptom Assessment (ESAS 0-10 Scale)  Pain:  0  Dyspnea:  0  Anxiety:  0  Nausea:  0  Depression:  0  Anorexia:  0  Fatigue:  0  Insomnia:  0  Restlessness:  0  Agitation:  0  Unable to complete assessment due to Acuity of condition         Pain Assessment in Advanced Demential Scale (PAINAD)   Breathing - Independent of vocalization:  1  Negative vocalization:  0  Facial expression:  0  Body language:  0  Consolability:  0  Total:  1    Modified Mitch Scale:  4    Performance Status:  40    Living Arrangements:  Lives with spouse    Psychosocial/Cultural: [place] from [employer],  60 + years, 5 daughters, retired lives with wife and daughter    Spiritual:  F - Nikki and Belief:  Rastafarian   A - Address in Care:   following         Advance Care Planning   Advance Directives:   Living Will: No        Oral Declaration: No    LaPOST: No    Do Not Resuscitate Status: No    Medical Power of : No        Oral Declaration: No      Decision Making:  Family answered questions         Significant Labs: All pertinent labs within the past 24 hours have been reviewed.  CBC:   Recent Labs   Lab 02/08/22 0402   WBC 10.61   HGB 12.4*   HCT 41.0   MCV 80*   *     BMP:  Recent Labs   Lab 02/08/22 0402   *   *   K 3.7   *   CO2 19*   BUN 96*   CREATININE 2.1*   CALCIUM 7.6*   MG 2.7*     LFT:  Lab Results   Component Value Date    AST 57 (H) 02/08/2022    ALKPHOS 99 02/08/2022    BILITOT 1.5 (H) 02/08/2022     Albumin:   Albumin   Date Value Ref Range Status   02/08/2022 1.9 (L) 3.5 - 5.2 g/dL  Final     Protein:   Total Protein   Date Value Ref Range Status   02/08/2022 4.9 (L) 6.0 - 8.4 g/dL Final     Lactic acid:   Lab Results   Component Value Date    LACTATE 1.8 02/05/2022    LACTATE 2.2 02/04/2022       Significant Imaging: I have reviewed all pertinent imaging results/findings within the past 24 hours.      > 50% of 70  min visit spent in chart review, face to face discussion of goals of care,  symptom assessment, coordination of care and emotional support.    ANDREW Brownlee, APRN, ACNS-BC  Palliative Medicine  [address] - ext [phone]

## 2022-02-08 NOTE — PROGRESS NOTES
[facility] - Intensive Care ([facility])  Vascular Neurology  Comprehensive Stroke Center  Progress Note    Assessment/Plan:     * Acute hypoxemic respiratory failure  See COVID 19     Acute arterial ischemic stroke, multifocal, multiple vascular territories  Patient is an 87 yo M with PMHx of HTN, Afib, COVID and HLD who was admitted for respiratory failure. Patient encephalopathic on admission. As part of encephalopathic workup, CTH(2/3) was obtained which was negative. During his admission, patient noted to be lethargic with AMS. MRI Brain W WO ordered and revealed multiple acute infarcts bilaterally (R temporal, L frontal, bilat occipital, R inferior cerebellum). No tPA OOW. No IR as symptoms not concerning for LVO at this time.  Etiology of infarcts is a combination of hypoperfusion (given watershed distribution) and cardioembolic in the setting of Afib    Recommended vessel imaging. Patient will need AC in the setting of AF & reduced EF (20%). Currently on heparin gtt. Consider Eliquis when tolerating PO medications for secondary stroke risk reduction.      Antithrombotics for secondary stroke prevention: On heparin gtt    Statins for secondary stroke prevention and hyperlipidemia, if present:   Statins: Atorvastatin- 40 mg daily    Aggressive risk factor modification: HTN, HLD, A-Fib, EF 20%     Rehab efforts: The patient has been evaluated by a stroke team provider and the therapy needs have been fully considered based off the presenting complaints and exam findings. The following therapy evaluations are needed: PT evaluate and treat, OT evaluate and treat, SLP evaluate and treat    Diagnostics ordered/pending: None     VTE prophylaxis: Mechanical prophylaxis: Place SCDs  None: Reason for No Pharmacological VTE Prophylaxis: Currently on anticoagulation    BP parameters: Infarct: No intervention, SBP <220    Stroke team will sign off at this time as stroke work up is complete. Please follow up in clinic in  4-6 weeks with vascular neurology.     Mixed hyperlipidemia  Stroke RF  Recommend atorvastatin 40  Goal LDL<70    A-fib  Stroke RF  New onset   On heparin gtt with low intensity, will need AC with DOAC (Eliquis 5mg BID)     Pneumonia due to COVID-19 virus  Stroke RF  Hypercoagulable state  Use of Non Rebreather mask required (15L/min)   On remdesivir and dexamethasone  Continue contact, airborne, and droplet precautions         HTN (hypertension), benign  Stroke RF  Acute infarct on imaging, recommend SBP<180   SBP at goal        2/6/2022-R hemiparesis, moving left side spontaneously.   2/8/22: Patient on Precedex and actively moaning while in restraints. Moves L side spontaneously, no movement on R. Recommending vessel imaging.       STROKE DOCUMENTATION        NIH Scale:  1a. Level of Consciousness: 0-->Alert, keenly responsive  1b. LOC Questions: 2-->Answers neither question correctly  1c. LOC Commands: 2-->Performs neither task correctly  2. Best Gaze: 1-->Partial gaze palsy, gaze is abnormal in one or both eyes, but forced deviation or total gaze paresis is not present  3. Visual: 0-->No visual loss  4. Facial Palsy: 1-->Minor paralysis (flattened nasolabial fold, asymmetry on smiling)  5a. Motor Arm, Left: 0-->No drift, limb holds 90 (or 45) degrees for full 10 secs  5b. Motor Arm, Right: 4-->No movement  6a. Motor Leg, Left: 0-->No drift, leg holds 30 degree position for full 5 secs  6b. Motor Leg, Right: 4-->No movement  7. Limb Ataxia: 0-->Absent  8. Sensory: 1-->Mild-to-moderate sensory loss, patient feels pinprick is less sharp or is dull on the affected side, or there is a loss of superficial pain with pinprick, but patient is aware of being touched  9. Best Language: 3-->Mute, global aphasia, no usable speech or auditory comprehension  10. Dysarthria: 2-->Severe dysarthria, patients speech is so slurred as to be unintelligible in the absence of or out of proportion to any dysphasia, or is  mute/anarthric  11. Extinction and Inattention (formerly Neglect): 0-->No abnormality  Total (NIH Stroke Scale): 20       Modified Magaly Score: 0  Andi Coma Scale:    ABCD2 Score:    [id number] Score:   HAS -BLED Score:   ICH Score:   Hunt & Ledezma Classification:      Hemorrhagic change of an Ischemic Stroke: Does this patient have an ischemic stroke with hemorrhagic changes? No     Neurologic Chief Complaint: R hemiparesis    Subjective:     Interval History: Patient is seen for follow-up neurological assessment and treatment recommendations:   Patient on Precedex and actively moaning while in restraints. Moves L side spontaneously, no movement on R. Recommending vessel imaging.     HPI, Past Medical, Family, and Social History remains the same as documented in the initial encounter.     Review of Systems   Unable to perform ROS: Patient nonverbal     Scheduled Meds:   albuterol  2 puff Inhalation Q8H    ampicillin-sulbactim (UNASYN) IVPB  3 g Intravenous Q12H    dexamethasone  6 mg Intravenous Daily    levothyroxine  50 mcg Oral Daily    multivitamin  1 tablet Oral Daily    mupirocin   Nasal BID    polyethylene glycol  17 g Oral Daily     Continuous Infusions:   dexmedetomidine (PRECEDEX) infusion 1 mcg/kg/hr (02/08/22 1300)    heparin (porcine) in D5W 13 Units/kg/hr (02/08/22 1300)     PRN Meds:acetaminophen, benzonatate, dextrose 10%, dextrose 10%, glucagon (human recombinant), glucose, glucose, midazolam, morphine, naloxone, sodium chloride 0.9%    Objective:     Vital Signs (Most Recent):  Temp: 98.6 °F (37 °C) (02/08/22 1100)  Pulse: 70 (02/08/22 1300)  Resp: (!) 44 (02/08/22 1300)  BP: (!) 166/94 (02/08/22 1300)  SpO2: (!) 89 % (02/08/22 1300)  BP Location: Right arm    Vital Signs Range (Last 24H):  Temp:  [96 °F (35.6 °C)-98.6 °F (37 °C)]   Pulse:  [66-75]   Resp:  [14-44]   BP: ()/(57-94)   SpO2:  [87 %-100 %]   BP Location: Right arm    Physical Exam  Vitals and nursing note reviewed.    Constitutional:       Appearance: He is ill-appearing.   HENT:      Head: Normocephalic and atraumatic.      Nose: Nose normal.   Eyes:      Conjunctiva/sclera: Conjunctivae normal.      Comments: R gaze   Cardiovascular:      Rate and Rhythm: Normal rate.   Pulmonary:      Effort: Respiratory distress present.   Musculoskeletal:      Right lower leg: No edema.      Left lower leg: No edema.   Skin:     General: Skin is warm and dry.   Neurological:      Mental Status: He is lethargic.      Comments: R facial droop  Moaning, unable to follow commands  R hemiparesis  Moving L side spontaneuosly         Neurological Exam:   *Exam limited due to patient's AMS  LOC: lethargic  Attention Span: poor  Language: Mute  Articulation: Mute/Anarthric  Orientation: unable to assess as patient nonverbal  R gaze preference  Motor: R hemiparesis, moving LUE/LLE spontaneously      Laboratory:  CMP:   Recent Labs   Lab 02/08/22  0402   CALCIUM 7.6*   ALBUMIN 1.9*   PROT 4.9*   *   K 3.7   CO2 19*   *   BUN 96*   CREATININE 2.1*   ALKPHOS 99   ALT 26   AST 57*   BILITOT 1.5*     CBC:   Recent Labs   Lab 02/08/22  0402   WBC 10.61   RBC 5.11   HGB 12.4*   HCT 41.0   *   MCV 80*   MCH 24.3*   MCHC 30.2*     Lipid Panel:   Recent Labs   Lab 02/06/22  0400   CHOL 97*   LDLCALC 58.6*   HDL 25*   TRIG 67     Coagulation:   Recent Labs   Lab 02/06/22  1100 02/06/22  1800 02/08/22  1024   INR 1.6*  --   --    APTT 30.6   < > 44.2*    < > = values in this interval not displayed.     Platelet Aggregation Study: No results for input(s): PLTAGG, PLTAGINTERP, PLTAGREGLACO, ADPPLTAGGREG in the last 168 hours.  Hgb A1C:   Recent Labs   Lab 02/06/22  0400   HGBA1C 6.0*     TSH:   Recent Labs   Lab 02/03/22 2122   TSH 3.057       Diagnostic Results        Brain/Vessel imaging:  MRI Brain W WO 2/5/2022    Numerous regions of diffusion restriction in the supratentorial parenchyma suggestive of multifocal acute embolic infarcts.  Small acute infarction in the right inferior cerebellum. Generalized volume loss and microvascular ischemic change.     CTH WO 2/3/2022  No acute intraparenchymal hemorrhage or major vascular distribution infarction.  Additional evaluation, as clinically warranted. Generalized cerebral volume loss and chronic ischemic microvascular changes.     Cardiac Evaluation:   TTE 2/4/2022  · The left ventricle is normal in size with severely decreased systolic function. The estimated ejection fraction is 20%.  · Normal right ventricular size with moderately to severely reduced right ventricular systolic function.  · Left ventricular diastolic dysfunction.  · Biatrial enlargement.  · The ascending aorta is mildly dilated.  · Mild mitral regurgitation.  · Mild tricuspid regurgitation.  · The estimated PA systolic pressure is 51 mmHg.  · Intermediate central venous pressure (8 mmHg).        Alexsander Gilbert PA-C  Comprehensive Stroke Center  Department of Vascular Neurology   [facility] - Intensive Care ([place] [address])

## 2022-02-08 NOTE — PROGRESS NOTES
"[facility] - Intensive Care ([place] [address])  Critical Care Medicine  Progress Note    Patient Name: Lalo Samuel  MRN: [id number]  Admission Date: 2/3/2022  Hospital Length of Stay: 5 days  Code Status: DNR  Attending Provider: Celena Cai MD  Primary Care Provider: SHARRON Padilla MD   Principal Problem: Acute hypoxemic respiratory failure    Subjective:     HPI:  Mr. Samuel is a 87 yo M w/ a history of HTN and HLD who presents today for SOB. History was obtained from patient's daughter and ER records due to patient being acutely encephalopathic. Daughter states patient tested COVID-19 (+) 1/26. At time of positive test, he endorsed cough, congestion, post sinus drip. Symptoms improved until approximately 3 days prior when he began having increased SOB and fatigue. Family also reports symptoms consistent with hypoactive delirium (non-verbal, "blank" stare), as well as decreased oral intake and a few episodes of diarrhea. Patient's family called EMS twice but patient refused. Today, patient became acutely confused and agitated with labored breathing which prompted family to call EMS again. Of note patient is not vaccinated against COVID-19. He lives with his wife (who is vaccinated) and daughter who is not vaccinated. At baseline, he carries out ADLs independently.      In the ED, patient tachycardic, tachypneic but afebrile. Initially hypoxic w/ improved SpO2 94% on 5L NC. On examination, patient ill appearing, extremely agitated, and w/ increased work of breathing - using accessory muscles and intermittent pursed lip breathing. Labs notable for increased inflammatory markers - ferritin 877, , . Lactate 3.9. No leukocytosis, procal wnl. COVID-19 (+). CXR w/ significant patchy b/l airspace opacities concerning for post viral PNA vs pulmonary edema. CTH (-). Trop 0.075. EKG showing Afib w/ RVR. Received 1 x IV metoprolol 5mg w/ improvement of HR to ~110. Patient received 1.5L IVFs in the ED, " broad spectrum abx w/ vanc and zosyn and 1 x IV dexamethasone 6mg. He was admitted to hospital medicine for further management; however, MICU consulted for uptrending lactate and acute hypoxemic respiratory failure.    MICU Consulted for Acute Hypoxemic Respiratory Failure in the setting of COVID-19          Hospital/ICU Course:  Admitted to MICU for AMS and acute hypoxic respiratory failure 2/2 COVID. CTH unremarkable. LP with no signs of infection. Procal negative. Placed on COVID PNA protocol. With Remdesivir, Dexamethasone, and Baricitinib. CT Chest with diffuse patchy ground glass and consolidation. On broad spectrum Vanc and Zosyn. MRI Brain showing multiple acute embolic infarcts, vascular neuro consulted recommending CTA head and neck, however, patient has OSWADLO (likely 2/2 to contrast or pre renal) and increasing oxygen requirements, holding off for now. Plan for palliative consult, echo w/bubble.   2/8/22 - Increasing oxygen requirement overnight, placed on 15L non rebreather. Received seroquel for agitation overnight.       No new subjective & objective note has been filed under this hospital service since the last note was generated.      ABG  Recent Labs   Lab 02/04/22  2136   PH 7.399   PO2 60*   PCO2 37.1   HCO3 23.0*   BE -2     Assessment/Plan:     Neuro  Acute arterial ischemic stroke, multifocal, multiple vascular territories  Patient with new onset a-fib, covid positive, acutely encephalopathic. MRI 2/5 revealed findings suggestive of multifocal acute embolic infarcts, as well as a small acute infarction in the right inferior cerebellum. Vascular neuro consulted, recommended CTA head and neck. However, patient has had increasing oxygen requirements as well as developed an OSWALDO. Spoke with vascular neuro and will hold on CTA for now.     -BP <220  - continue full AC, switched from lovenox to heparin gtt with worsening kidney function       Acute metabolic encephalopathy    --CT head neg for acute  intracranial abnormality  --LP unremarkable  --TSH WNL  --MRI showing acute embolic infarcts, vascular neuro consulted, recommended CTA or MRA however oxygen requirement increasig and OSWALDO, so holding for now.   --Precedex gtt for acute agitation       Pulmonary  * Acute hypoxemic respiratory failure  Patient with Hypoxic Respiratory failure which is Acute.  he is not on home oxygen. Supplemental oxygen was provided and noted-  .   Signs/symptoms of respiratory failure include- tachypnea and increased work of breathing. Contributing diagnoses includes - CHF, Pneumonia and Covid-19 Labs and images were reviewed. Patient Has recent ABG, which has been reviewed. Will treat underlying causes and adjust management of respiratory failure as follows-     --Likely multifactorial in setting of Covid-19 infection and suspected HF (elevated BNP with reduced LVEF on bedside echo)  --Continue tx for Covid (see above); Added Baricitinib (2/5), but was NPO without NG so received tocilzumab  --x1 dose lasix on 2/4 with good UOP  --lower suspicion for superimposed bacterial infection (no leukocytosis and neg procal), however was on broad spectrum antibiotics vanc and zosyn. Given worsening kidney infection and low suspicion of bacterial infection without positive BC vanc was dc'd.   --f/u blood and sputum cx  --currently requiring 15L HFNC; wean as tolerated    TTE 2/4/22:  · The left ventricle is normal in size with severely decreased systolic function. The estimated ejection fraction is 20%.  · Normal right ventricular size with moderately to severely reduced right ventricular systolic function.  · Left ventricular diastolic dysfunction.  · Biatrial enlargement.  · The ascending aorta is mildly dilated.  · Mild mitral regurgitation.  · Mild tricuspid regurgitation.  · The estimated PA systolic pressure is 51 mmHg.  · Intermediate central venous pressure (8 mmHg).        Cardiac/Vascular  A-fib  --new onset; suspect secondary to  acute illness  --continue full dose anticoagulation  --low threshold for initiation of BB if in RVR    TTE 2/4/22:  · The left ventricle is normal in size with severely decreased systolic function. The estimated ejection fraction is 20%.  · Normal right ventricular size with moderately to severely reduced right ventricular systolic function.  · Left ventricular diastolic dysfunction.  · Biatrial enlargement.  · The ascending aorta is mildly dilated.  · Mild mitral regurgitation.  · Mild tricuspid regurgitation.  · The estimated PA systolic pressure is 51 mmHg.  · Intermediate central venous pressure (8 mmHg).        HTN (hypertension), benign  --Home amlodipine currently on hold  --Consider starting BB for rate control if pt develops RVR given new onset a fib; would also benefit from GDMT given reduced EF  --given elevated BNP and cxr concerning for pulm edema, recieved lasix x1 dose    Renal/  Hypernatremia  -Na 151, stable from yesterday   -Free water 300mL q4h   -Daily cmp     OSWALDO (acute kidney injury)  Patient Cr increasing, from 1.0 to 2.1 now. Likely contrast induced or pre renal.    - avoid nephrotoxic agents  -renally dose medications  - Holding CTA head and neck  - DC vanc  - switched therapeutic lovenox to heparin gtt given worsening kidney function  - free water 300 changed from qid to q4h 2/8/22    Endocrine  Hypothyroidism  --TSH 3.057 (2/4)  --Continue home levothyroxine    Other  Elevated lactic acid level  --no obvious source of infection; no leukocytosis and neg procal  --DC vanc, continuing zosyn for now  --follow up blood (NGTD), CSF (NGTD) and sputum cx  --concern for possible RLE ischemia (extremity cool to touch and unable to doppler pulse) -> LE arterial US negative  --Lactate downtrending    Pneumonia due to COVID-19 virus  --Covid 19 positive on 1/26. Patient is not vaccinated.   --continue dexamethasone, remdesivir, received tocilizumab  --On full dose anticoagulation Given OSWALDO switched  from therapeutic lovenox to Heparin  --currently requiring 15L HFNC; wean as tolerated, however anticipate that his FiO2 requirement will likely worsen given typical disease trajectory        Critical Care Daily Checklist:     A: Awake: RASS Goal/Actual Goal: RASS Goal: -1-->drowsy  Actual: Martin Agitation Sedation Scale (RASS): Restless   B: Spontaneous Breathing Trial Performed?    C: SAT & SBT Coordinated?                        D: Delirium: CAM-ICU Overall CAM-ICU: Positive   E: Early Mobility Performed? No   F: Feeding Goal:    Status:     Current Diet Order   Procedures    Diet NPO       AS: Analgesia/Sedation precedex   T: Thromboembolic Prophylaxis Heparin gtt   H: HOB > 300 Yes   U: Stress Ulcer Prophylaxis (if needed)     G: Glucose Control none   B: Bowel Function Stool Occurrence: 0   I: Indwelling Catheter (Lines & Valenzuela) Necessity Cath, PIV   D: De-escalation of Antimicrobials/Pharmacotherapies zosyn     Plan for the day/ETD Goals of care, wean O2 if tolerated     Code Status:  Family/Goals of Care: Full Code             Critical secondary to Patient has a condition that poses threat to life and bodily function: Severe Respiratory Distress      Critical care was time spent personally by me on the following activities: development of treatment plan with patient or surrogate and bedside caregivers, discussions with consultants, evaluation of patient's response to treatment, examination of patient, ordering and performing treatments and interventions, ordering and review of laboratory studies, ordering and review of radiographic studies, pulse oximetry, re-evaluation of patient's condition. This critical care time did not overlap with that of any other provider or involve time for any procedures.     Mio Ferro MD  Critical Care Medicine  [facility] - Intensive Care ([place] [address])

## 2022-02-08 NOTE — ASSESSMENT & PLAN NOTE
Stroke RF  Hypercoagulable state  Use of Non Rebreather mask required (15L/min)   On remdesivir and dexamethasone  Continue contact, airborne, and droplet precautions

## 2022-02-08 NOTE — ASSESSMENT & PLAN NOTE
Palliative medicine consulted for end of life goals of care and hospice disussion    Mr. Samuel is an 87 yo gentleman admitted with COVID pneumonia and acute hypoxic respiratory failure receiving  remdesivir and dexamethazone.  Requiring high flow oxygen at 15 liters with 100% NRB mask with Sats 90 %. Appears agitated - moaning and screaming. Remains encephalopathic and unable   To participate in conversation.       Advance Care Planning     - No advanced care documents received.  - Legal next of kin for decision making is emrt's wife Jamilah Samuel.    - Mr. Samuel is not decisional at this time.  - As per conversation with daughter, Rima Perez [phone], wife makes decisions with assistance from her daughters.    - Rima is a RN and had been designated as the family spokes person.  - No previous discussions regarding advanced care planning or goals of care have been held.  - Patient remains full code and current plan of care to be continued.     Goals of Care  - met with jacob Mace at bedside and also pulmonary fellow joined the meeting  - Nakita reports Rima provided the family with clinical updates.  Mother and oldest  remains to have concerns regarding resuscitation and transition to comfort. They have not made and definitive  Goals.  Nakita states the entire family does not Wish for Mr. Samuel to suffer.   - Nakita also reports the patient's wife and rest of family would be okay with Mr. Samuel dying at home.  - Discussed current oxygen requirement will prevent leaving the hospital   - introduced comfort care - focused on use of medications morphine and lorazepam to manage   Shortness of breath and anxiety while withdrawing life support - in this case the high oxygen requirement  - explained the goal of comfort care is not to hasten death.   - Pulmonary fellow at bedside.  Explained there is a short window of opportunity available to reach this goal.  Pulmonary fellow also discussed  risk that Mr. Samuel could also die before this goal is met.  - Palliative medicine had discussed risks, benefits and survivability of CPR and revisited recommendation  For no CPR.  Pulmonary fellow echoed this concern and DNR to be written.    - Pal med encouraged family to continue to discuss plan of care and a decision is needed soon.   Nakita states the family is meeting at 6 PM tonight and will contact nursing staff if a decision is made tonight.    - Hospice philosophy introduced. Will need to decrease oxygen and be stable for transport.  Family is aware this is not possible currently.  Family will discuss transition to comfort with hopes of him being home.   - Intense emotional support.     Dysnea   - requiring high flow oxygen with non rebreather  - patient may benefit from morphine 1 mg IVP every 4 hrs as needed for dyspnea    Recommendations  - DNR per primary team  - family wish is for patient to dye at home. Anticipate social service consult for home hospice  - see above symptom management, dyspnea, recommendations  - Family has requested anointing of the sick. -  services notified   - intense emotional support provided.      Primary team is aware of above goals of care and recommendations

## 2022-02-08 NOTE — ASSESSMENT & PLAN NOTE
Patient is an 89 yo M with PMHx of HTN, Afib, COVID and HLD who was admitted for respiratory failure. Patient encephalopathic on admission. As part of encephalopathic workup, CTH(2/3) was obtained which was negative. During his admission, patient noted to be lethargic with AMS. MRI Brain W WO ordered and revealed multiple acute infarcts bilaterally (R temporal, L frontal, bilat occipital, R inferior cerebellum). No tPA OOW. No IR as symptoms not concerning for LVO at this time.  Etiology of infarcts is a combination of hypoperfusion (given watershed distribution) and cardioembolic in the setting of Afib    Recommended vessel imaging. Patient will need AC in the setting of AF & reduced EF (20%). Currently on heparin gtt. Consider Eliquis when tolerating PO medications for secondary stroke risk reduction.      Antithrombotics for secondary stroke prevention: On heparin gtt    Statins for secondary stroke prevention and hyperlipidemia, if present:   Statins: Atorvastatin- 40 mg daily    Aggressive risk factor modification: HTN, HLD, A-Fib, EF 20%     Rehab efforts: The patient has been evaluated by a stroke team provider and the therapy needs have been fully considered based off the presenting complaints and exam findings. The following therapy evaluations are needed: PT evaluate and treat, OT evaluate and treat, SLP evaluate and treat    Diagnostics ordered/pending: None     VTE prophylaxis: Mechanical prophylaxis: Place SCDs  None: Reason for No Pharmacological VTE Prophylaxis: Currently on anticoagulation    BP parameters: Infarct: No intervention, SBP <220    Stroke team will sign off at this time as stroke work up is complete. Please follow up in clinic in 4-6 weeks with vascular neurology.

## 2022-02-08 NOTE — PLAN OF CARE
Recommendations     1. As tolerated, increase TF rate (of Impact Peptide) to 45 mL/hr - 1620 calories, 101 g of protein, 832 mL fluid.  2. Monitor renal function & change TF regimen to Novasource @ 35 mL/hr if necessary - 1680 calories & 76 g of protein.   3. RD to monitor & follow-up.     Goals: Meet % EEN, EPN by RD f/u date  Nutrition Goal Status: new  Communication of RD Recs: reviewed with RN

## 2022-02-08 NOTE — PLAN OF CARE
[facility] DAILY GOALS       A: Awake    RASS: Goal - RASS Goal: -2-->light sedation  Actual - RASS (Martin Agitation-Sedation Scale): -2-->light sedation   Restraint necessity: Clinical Justification: Treatment Interference,Removing medical devices  B: Breathe   SBT: Not intubated   C: Coordinate A & B, analgesics/sedatives   Pain: managed    SAT: Not intubated  D: Delirium   CAM-ICU: Overall CAM-ICU: Positive  E: Early(intubated/ Progressive (non-intubated) Mobility   MOVE Screen: Fail   Activity: Activity Management: Rolling - L1  FAS: Feeding/Nutrition   Diet order: Diet/Nutrition Received: tube feeding,NPO,    T: Thrombus   DVT prophylaxis: VTE Required Core Measure: Pharmacological prophylaxis initiated/maintained  H: HOB Elevation   Head of Bed (HOB) Positioning: HOB at 30 degrees  U: Ulcer Prophylaxis   GI: no  G: Glucose control   managed    S: Skin   Bathing/Skin Care: bath, complete,dressed/undressed,electrode patches/site rotation,incontinence care,linen changed,back care  Device Skin Pressure Protection: absorbent pad utilized/changed,adhesive use limited,positioning supports utilized,skin-to-skin areas padded,skin-to-device areas padded,pressure points protected  Pressure Reduction Devices: elbow protectors utilized,foam padding utilized,positioning supports utilized,pressure-redistributing mattress utilized,heel offloading device utilized  Pressure Reduction Techniques: frequent weight shift encouraged,heels elevated off bed,pressure points protected,weight shift assistance provided  Skin Protection: adhesive use limited,incontinence pads utilized,silicone foam dressing in place,skin-to-device areas padded,skin-to-skin areas padded,transparent dressing maintained,tubing/devices free from skin contact  B: Bowel Function   no issues   I: Indwelling Catheters   Valenzuela necessity:      Urethral Catheter 02/04/22 0504 Straight-tip 18 Fr.-Reason for Continuing Urinary Catheterization: Critically ill in ICU and  requiring hourly monitoring of intake/output   CVC necessity: No  D: De-escalation Antibiotics   Yes    Family/Goals of care/Code Status   Code Status: Full Code    24H Vital Sign Range  Temp:  [96 °F (35.6 °C)-98.6 °F (37 °C)]   Pulse:  [66-75]   Resp:  [14-34]   BP: ()/(57-82)   SpO2:  [87 %-100 %]      Shift Events   Continuing goals of care conversations with family.    VS and assessment per flow sheet, patient progressing towards goals as tolerated, plan of care reviewed with  Lalo Samuel and family , all concerns addressed, will continue to monitor.    Sylvie Madrigal

## 2022-02-08 NOTE — SUBJECTIVE & OBJECTIVE
Neurologic Chief Complaint: R hemiparesis    Subjective:     Interval History: Patient is seen for follow-up neurological assessment and treatment recommendations:   Patient on Precedex and actively moaning while in restraints. Moves L side spontaneously, no movement on R. Recommending vessel imaging.     HPI, Past Medical, Family, and Social History remains the same as documented in the initial encounter.     Review of Systems   Unable to perform ROS: Patient nonverbal     Scheduled Meds:   albuterol  2 puff Inhalation Q8H    ampicillin-sulbactim (UNASYN) IVPB  3 g Intravenous Q12H    dexamethasone  6 mg Intravenous Daily    levothyroxine  50 mcg Oral Daily    multivitamin  1 tablet Oral Daily    mupirocin   Nasal BID    polyethylene glycol  17 g Oral Daily     Continuous Infusions:   dexmedetomidine (PRECEDEX) infusion 1 mcg/kg/hr (02/08/22 1300)    heparin (porcine) in D5W 13 Units/kg/hr (02/08/22 1300)     PRN Meds:acetaminophen, benzonatate, dextrose 10%, dextrose 10%, glucagon (human recombinant), glucose, glucose, midazolam, morphine, naloxone, sodium chloride 0.9%    Objective:     Vital Signs (Most Recent):  Temp: 98.6 °F (37 °C) (02/08/22 1100)  Pulse: 70 (02/08/22 1300)  Resp: (!) 44 (02/08/22 1300)  BP: (!) 166/94 (02/08/22 1300)  SpO2: (!) 89 % (02/08/22 1300)  BP Location: Right arm    Vital Signs Range (Last 24H):  Temp:  [96 °F (35.6 °C)-98.6 °F (37 °C)]   Pulse:  [66-75]   Resp:  [14-44]   BP: ()/(57-94)   SpO2:  [87 %-100 %]   BP Location: Right arm    Physical Exam  Vitals and nursing note reviewed.   Constitutional:       Appearance: He is ill-appearing.   HENT:      Head: Normocephalic and atraumatic.      Nose: Nose normal.   Eyes:      Conjunctiva/sclera: Conjunctivae normal.      Comments: R gaze   Cardiovascular:      Rate and Rhythm: Normal rate.   Pulmonary:      Effort: Respiratory distress present.   Musculoskeletal:      Right lower leg: No edema.      Left lower leg: No  edema.   Skin:     General: Skin is warm and dry.   Neurological:      Mental Status: He is lethargic.      Comments: R facial droop  Moaning, unable to follow commands  R hemiparesis  Moving L side spontaneuosly         Neurological Exam:   *Exam limited due to patient's AMS  LOC: lethargic  Attention Span: poor  Language: Mute  Articulation: Mute/Anarthric  Orientation: unable to assess as patient nonverbal  R gaze preference  Motor: R hemiparesis, moving LUE/LLE spontaneously      Laboratory:  CMP:   Recent Labs   Lab 02/08/22  0402   CALCIUM 7.6*   ALBUMIN 1.9*   PROT 4.9*   *   K 3.7   CO2 19*   *   BUN 96*   CREATININE 2.1*   ALKPHOS 99   ALT 26   AST 57*   BILITOT 1.5*     CBC:   Recent Labs   Lab 02/08/22 0402   WBC 10.61   RBC 5.11   HGB 12.4*   HCT 41.0   *   MCV 80*   MCH 24.3*   MCHC 30.2*     Lipid Panel:   Recent Labs   Lab 02/06/22  0400   CHOL 97*   LDLCALC 58.6*   HDL 25*   TRIG 67     Coagulation:   Recent Labs   Lab 02/06/22  1100 02/06/22  1800 02/08/22  1024   INR 1.6*  --   --    APTT 30.6   < > 44.2*    < > = values in this interval not displayed.     Platelet Aggregation Study: No results for input(s): PLTAGG, PLTAGINTERP, PLTAGREGLACO, ADPPLTAGGREG in the last 168 hours.  Hgb A1C:   Recent Labs   Lab 02/06/22  0400   HGBA1C 6.0*     TSH:   Recent Labs   Lab 02/03/22 2122   TSH 3.057       Diagnostic Results        Brain/Vessel imaging:  MRI Brain W WO 2/5/2022    Numerous regions of diffusion restriction in the supratentorial parenchyma suggestive of multifocal acute embolic infarcts. Small acute infarction in the right inferior cerebellum. Generalized volume loss and microvascular ischemic change.     CTH WO 2/3/2022  No acute intraparenchymal hemorrhage or major vascular distribution infarction.  Additional evaluation, as clinically warranted. Generalized cerebral volume loss and chronic ischemic microvascular changes.     Cardiac Evaluation:   TTE 2/4/2022  · The left  ventricle is normal in size with severely decreased systolic function. The estimated ejection fraction is 20%.  · Normal right ventricular size with moderately to severely reduced right ventricular systolic function.  · Left ventricular diastolic dysfunction.  · Biatrial enlargement.  · The ascending aorta is mildly dilated.  · Mild mitral regurgitation.  · Mild tricuspid regurgitation.  · The estimated PA systolic pressure is 51 mmHg.  · Intermediate central venous pressure (8 mmHg).

## 2022-02-08 NOTE — ASSESSMENT & PLAN NOTE
Stroke RF  New onset   On heparin gtt with low intensity, will need AC with DOAC (Eliquis 5mg BID)

## 2022-02-09 PROBLEM — Z51.5 COMFORT MEASURES ONLY STATUS: Status: ACTIVE | Noted: 2022-01-01

## 2022-02-09 NOTE — SUBJECTIVE & OBJECTIVE
Interval History:     Medications:  Continuous Infusions:   dexmedetomidine (PRECEDEX) infusion Stopped (02/09/22 1354)    morphine 5 mg/hr (02/09/22 1534)     Scheduled Meds:   glycopyrrolate  0.2 mg Intravenous QID    LORazepam  2 mg Oral Once     PRN Meds:haloperidol lactate, lorazepam, mophine, morphine    Objective:     Vital Signs (Most Recent):  Temp: 97.7 °F (36.5 °C) (02/09/22 0701)  Pulse: 89 (02/09/22 1500)  Resp: (!) 30 (02/09/22 1501)  BP: 130/63 (02/09/22 1500)  SpO2: (!) 76 % (02/09/22 1500) Vital Signs (24h Range):  Temp:  [97.7 °F (36.5 °C)-98 °F (36.7 °C)] 97.7 °F (36.5 °C)  Pulse:  [71-95] 89  Resp:  [17-47] 30  SpO2:  [76 %-100 %] 76 %  BP: ()/(50-70) 130/63     Weight: 69.9 kg (154 lb 1.6 oz)  Body mass index is 26.45 kg/m².            Advance Care Planning   Advance Care Planning       Significant Labs: All pertinent labs within the past 24 hours have been reviewed.  CBC:   Recent Labs   Lab 02/09/22 0411   WBC 10.19   HGB 12.4*   HCT 40.9   MCV 82   PLT 88*     BMP:  Recent Labs   Lab 02/09/22 0411      *   K 4.0   *   CO2 20*   BUN 98*   CREATININE 1.7*   CALCIUM 7.9*   MG 2.9*     LFT:  Lab Results   Component Value Date    AST 59 (H) 02/09/2022    ALKPHOS 107 02/09/2022    BILITOT 1.8 (H) 02/09/2022     Albumin:   Albumin   Date Value Ref Range Status   02/09/2022 1.9 (L) 3.5 - 5.2 g/dL Final     Protein:   Total Protein   Date Value Ref Range Status   02/09/2022 4.8 (L) 6.0 - 8.4 g/dL Final     Lactic acid:   Lab Results   Component Value Date    LACTATE 1.8 02/05/2022    LACTATE 2.2 02/04/2022       Significant Imaging: None

## 2022-02-09 NOTE — DISCHARGE SUMMARY
"[facility] ([place] [address])  Critical Care Medicine  Discharge Summary      Patient Name: Lalo Samuel  MRN: [id number]  Admission Date: 2/3/2022  Hospital Length of Stay: 6 days  Discharge Date and Time:  02/09/2022 3:55 PM  Attending Physician: Celena Cai MD   Discharging Provider: Mio Ferro MD  Primary Care Provider: SHARRON Padilla MD  Reason for Admission: Hypoxic respiratory failure     HPI:   Mr. Samuel is a 89 yo M w/ a history of HTN and HLD who presents today for SOB. History was obtained from patient's daughter and ER records due to patient being acutely encephalopathic. Daughter states patient tested COVID-19 (+) 1/26. At time of positive test, he endorsed cough, congestion, post sinus drip. Symptoms improved until approximately 3 days prior when he began having increased SOB and fatigue. Family also reports symptoms consistent with hypoactive delirium (non-verbal, "blank" stare), as well as decreased oral intake and a few episodes of diarrhea. Patient's family called EMS twice but patient refused. Today, patient became acutely confused and agitated with labored breathing which prompted family to call EMS again. Of note patient is not vaccinated against COVID-19. He lives with his wife (who is vaccinated) and daughter who is not vaccinated. At baseline, he carries out ADLs independently.      In the ED, patient tachycardic, tachypneic but afebrile. Initially hypoxic w/ improved SpO2 94% on 5L NC. On examination, patient ill appearing, extremely agitated, and w/ increased work of breathing - using accessory muscles and intermittent pursed lip breathing. Labs notable for increased inflammatory markers - ferritin 877, , . Lactate 3.9. No leukocytosis, procal wnl. COVID-19 (+). CXR w/ significant patchy b/l airspace opacities concerning for post viral PNA vs pulmonary edema. CTH (-). Trop 0.075. EKG showing Afib w/ RVR. Received 1 x IV metoprolol 5mg " w/ improvement of HR to ~110. Patient received 1.5L IVFs in the ED, broad spectrum abx w/ vanc and zosyn and 1 x IV dexamethasone 6mg. He was admitted to hospital medicine for further management; however, MICU consulted for uptrending lactate and acute hypoxemic respiratory failure.    MICU Consulted for Acute Hypoxemic Respiratory Failure in the setting of COVID-19          * No surgery found *    Indwelling Lines/Drains at Time of Discharge:   Lines/Drains/Airways     Drain                 Urethral Catheter 02/04/22 0504 Straight-tip 18 Fr. 5 days              Hospital Course:   Admitted to MICU for AMS and acute hypoxic respiratory failure 2/2 COVID. CTH unremarkable. LP with no signs of infection. Procal negative. Placed on COVID PNA protocol. With Remdesivir, Dexamethasone, and Baricitinib. CT Chest with diffuse patchy ground glass and consolidation. On broad spectrum Vanc and Zosyn. MRI Brain showing multiple acute embolic infarcts, vascular neuro consulted recommending CTA head and neck, however, patient has OSWALDO (likely 2/2 to contrast or pre renal) and increasing oxygen requirements, held off. Patient had increased oxygen requirements resulting in requiring non re-breather. Seroquel added for agitation. Palliative met with family, patient now DNR. Family has decided to transition to comfort measures, and will be coming in.          Consults (From admission, onward)        Status Ordering Provider     Inpatient consult to Social Work  Once        Provider:  (Not yet assigned)    Acknowledged NEGRA PATRICIO     Inpatient consult to Registered Dietitian/Nutritionist  Once        Provider:  (Not yet assigned)    Completed ALTON MARTINEZ     Inpatient consult to Palliative Care  Once        Provider:  (Not yet assigned)    Completed ALTON MARTINEZ     Inpatient consult to Vascular (Stroke) Neurology  Once        Provider:  (Not yet assigned)    Completed LANI MCCANN     Inpatient consult to Critical Care  Medicine  Once        Provider:  (Not yet assigned)    Completed IRMA GRIGSBY        Significant Labs:  All pertinent labs within the past 24 hours have been reviewed.    Significant Imaging:  I have reviewed all pertinent imaging results/findings within the past 24 hours.    Pending Diagnostic Studies:     None        Final Active Diagnoses:    Diagnosis Date Noted POA    PRINCIPAL PROBLEM:  Acute hypoxemic respiratory failure [J96.01] 02/04/2022 Yes    Comfort measures only status [Z51.5] 02/09/2022 Not Applicable    Moderate malnutrition [E44.0] 02/08/2022 Unknown    Hypernatremia [E87.0] 02/08/2022 Unknown    Dyspnea [R06.00]  Unknown    Goals of care, counseling/discussion [Z71.89]  Not Applicable    Advanced care planning/counseling discussion [Z71.89]  Not Applicable    Palliative care encounter [Z51.5]  Not Applicable    OSWALDO (acute kidney injury) [N17.9] 02/06/2022 No    Acute arterial ischemic stroke, multifocal, multiple vascular territories [I63.89] 02/05/2022 No    Mixed hyperlipidemia [E78.2] 02/05/2022 Yes    Pneumonia due to COVID-19 virus [U07.1, J12.82] 02/04/2022 Yes    A-fib [I48.91] 02/04/2022 Yes    Acute metabolic encephalopathy [G93.41] 02/04/2022 Yes    Elevated lactic acid level [R79.89] 02/04/2022 Yes    Hypothyroidism [E03.9] 03/25/2013 Yes    HTN (hypertension), benign [I10] 09/02/2012 Yes      Problems Resolved During this Admission:     Neuro  Acute arterial ischemic stroke, multifocal, multiple vascular territories  Patient with new onset a-fib, covid positive, acutely encephalopathic. MRI 2/5 revealed findings suggestive of multifocal acute embolic infarcts, as well as a small acute infarction in the right inferior cerebellum. Vascular neuro consulted, recommended CTA head and neck. However, patient has had increasing oxygen requirements as well as developed an OSWALDO. Spoke with vascular neuro and will hold on CTA for now.     -BP <220  - transitioning to comfort  measures       Acute metabolic encephalopathy    --CT head neg for acute intracranial abnormality  --LP unremarkable  --TSH WNL  --MRI showing acute embolic infarcts, vascular neuro consulted, recommended CTA or MRA however oxygen requirement increasig and OSWALDO, so held. Now DNR. transitioning to comfort measures  --Precedex gtt for acute agitation       Pulmonary  * Acute hypoxemic respiratory failure  Patient with Hypoxic Respiratory failure which is Acute.  he is not on home oxygen. Supplemental oxygen was provided and noted-  .   Signs/symptoms of respiratory failure include- tachypnea and increased work of breathing. Contributing diagnoses includes - CHF, Pneumonia and Covid-19 Labs and images were reviewed. Patient Has recent ABG, which has been reviewed. Will treat underlying causes and adjust management of respiratory failure as follows-     --currently requiring 15L non-rebreather  --transitioning to comfort measures    TTE 2/4/22:  · The left ventricle is normal in size with severely decreased systolic function. The estimated ejection fraction is 20%.  · Normal right ventricular size with moderately to severely reduced right ventricular systolic function.  · Left ventricular diastolic dysfunction.  · Biatrial enlargement.  · The ascending aorta is mildly dilated.  · Mild mitral regurgitation.  · Mild tricuspid regurgitation.  · The estimated PA systolic pressure is 51 mmHg.  · Intermediate central venous pressure (8 mmHg).    Echo with bubble did show intracardiac shunting        Cardiac/Vascular  A-fib  --new onset; suspect secondary to acute illness  --continue full dose anticoagulation  --low threshold for initiation of BB if in RVR    TTE 2/4/22:  · The left ventricle is normal in size with severely decreased systolic function. The estimated ejection fraction is 20%.  · Normal right ventricular size with moderately to severely reduced right ventricular systolic function.  · Left ventricular diastolic  dysfunction.  · Biatrial enlargement.  · The ascending aorta is mildly dilated.  · Mild mitral regurgitation.  · Mild tricuspid regurgitation.  · The estimated PA systolic pressure is 51 mmHg.  · Intermediate central venous pressure (8 mmHg).    Echo bubble   · Severe left atrial enlargement.  · The left ventricle is normal in size with severely decreased systolic function.  · The estimated ejection fraction is 20%.  · Left ventricular diastolic dysfunction.  · Normal right ventricular size with mildly reduced right ventricular systolic function.  · Mild aortic regurgitation.  · Mild tricuspid regurgitation.  · Elevated central venous pressure (15 mmHg).  · The estimated PA systolic pressure is 40 mmHg.  · Study is positive for intracardiac shunt, Bubbles visualized in left atrium at 3 beats. Very sluggish flow noted in left atrium, thus in the setting of stroke and atrial fibrillation, would also consider KALI thrombus on differential for embolic stroke.  · Sluggish flow in left ventricle with systemic contrast, however no obvious thrombus visualized.            HTN (hypertension), benign  --Home amlodipine currently on hold  -- transitioning to comfort measures      Renal/  Hypernatremia  -transitioning to comfort measures    OSWALDO (acute kidney injury)  Patient Cr increasing, from 1.0 to 2.1 now coming down to 1.7. Likely contrast induced or pre renal.    -transitioning to comfort measures    Endocrine  Hypothyroidism  --TSH 3.057 (2/)      Other  Palliative care encounter  Family has decided on transition to comfort measures  - morphine drip  - pain and anxiety control    Elevated lactic acid level  --resolved. transitioning to comfort measures    Pneumonia due to COVID-19 virus  --Covid 19 positive on . Patient is not vaccinated.   --currently requiring 15L non-breather, has had increasing oxygen requirements.   --transitioning to comfort measures        Discharged Condition:     Disposition:         Patient Instructions:   No discharge procedures on file.  Medications:  None     Mio Ferro MD  Critical Care Medicine  [facility] - Intensive [facility] ([place] [id number])

## 2022-02-09 NOTE — PROGRESS NOTES
[facility] ([place] [address])  Palliative Medicine  Progress Note    Patient Name: Lalo Samuel  MRN: [id number]  Admission Date: 2/3/2022  Hospital Length of Stay: 6 days  Code Status: DNR   Attending Provider: Celena Cai MD  Consulting Provider: ALEXANDRU North  Primary Care Physician: SHARRON Padilla MD  Principal Problem:Acute hypoxemic respiratory failure    Patient information was obtained from relative(s) and primary team.      Assessment/Plan:     Palliative care encounter  Palliative medicine consulted for end of life goals of care and hospice disussion    Mr. Samuel is an 89 yo gentleman admitted with COVID pneumonia and acute hypoxic respiratory failure receiving  remdesivir and dexamethazone.  Requiring high flow oxygen at 15 liters with 100% NRB mask with Sats 90 %. Appears agitated - moaning and screaming. Remains encephalopathic and unable   To participate in conversation.       Advance Care Planning     - No advanced care documents received.  - Legal next of kin for decision making is mert's wife Jamilah Samuel.    - Mr. Samuel is not decisional at this time.  - As per conversation with daughter, Rima Perez [phone], wife makes decisions with assistance from her daughters.    - Rima is a RN and had been designated as the family spokes person.  - No previous discussions regarding advanced care planning or goals of care have been held.  - Patient remains full code and current plan of care to be continued.     Goals of Care  - transitioned to full comfort measures.   -symptom management orders written per primary team  - family at bedside holding dao   -intense emotional support provided   - anointing of sick received                        I will follow-up with patient. Please contact us if you have any additional questions.    Subjective:     Chief Complaint:   Chief Complaint   Patient presents with    Altered Mental Status     Pt's TAZW was a  week ago. Pt is awake but not coherent. Pt just looks around and moans.        HPI:   HPI obtained from chart review:       Mr. Samuel is a 87 yo gentleman with PMH of:  HTN and HLD.  He presented to [facility] with c/o shortness of breath.  In ED   Patient was found to be encephalopathic.  Daughter provides history.  As per daughter  COVID-19 (+) 1/26. Symptoms included:   cough, congestion, post sinus drip.     Symptoms improved and after three days experienced  increased SOB and fatigue. Family activated EMS twice, patient refused   treatment. On day of admit, patient became acutely confused and agitated which prompted family to call EMS again.      In the ED, patient tachycardic, tachypneic but afebrile. Initially hypoxic w/ improved SpO2 94% on 5L NC. On examination, patient ill appearing, extremely agitated, and w/ increased work of breathing - using accessory muscles. Labs notable for increased inflammatory markers - ferritin 877, , . Lactate 3.9. No leukocytosis, procal wnl. COVID-19 (+). CXR w/ significant patchy b/l airspace opacities concerning for post viral PNA vs pulmonary edema. CTH (-). Trop 0.075. EKG showing Afib w/ RVR. Received 1 x IV metoprolol 5mg w/ improvement of HR to ~110. Patient received 1.5L IVFs in the ED, broad spectrum abx w/ vanc and zosyn and 1 x IV dexamethasone 6mg. He was admitted to hospital medicine for further management and transferred to critical care for management of uptrending lactate and   acute hypoxemic respiratory failure.    Palliative medicine consulted for goals of care and advanced care planning            Hospital Course:  No notes on file    Interval History:     Medications:  Continuous Infusions:   dexmedetomidine (PRECEDEX) infusion Stopped (02/09/22 1354)    morphine 5 mg/hr (02/09/22 1534)     Scheduled Meds:   glycopyrrolate  0.2 mg Intravenous QID    LORazepam  2 mg Oral Once     PRN Meds:haloperidol lactate, lorazepam, mophine,  morphine    Objective:     Vital Signs (Most Recent):  Temp: 97.7 °F (36.5 °C) (02/09/22 0701)  Pulse: 89 (02/09/22 1500)  Resp: (!) 30 (02/09/22 1501)  BP: 130/63 (02/09/22 1500)  SpO2: (!) 76 % (02/09/22 1500) Vital Signs (24h Range):  Temp:  [97.7 °F (36.5 °C)-98 °F (36.7 °C)] 97.7 °F (36.5 °C)  Pulse:  [71-95] 89  Resp:  [17-47] 30  SpO2:  [76 %-100 %] 76 %  BP: ()/(50-70) 130/63     Weight: 69.9 kg (154 lb 1.6 oz)  Body mass index is 26.45 kg/m².            Advance Care Planning   Advance Care Planning       Significant Labs: All pertinent labs within the past 24 hours have been reviewed.  CBC:   Recent Labs   Lab 02/09/22 0411   WBC 10.19   HGB 12.4*   HCT 40.9   MCV 82   PLT 88*     BMP:  Recent Labs   Lab 02/09/22 0411      *   K 4.0   *   CO2 20*   BUN 98*   CREATININE 1.7*   CALCIUM 7.9*   MG 2.9*     LFT:  Lab Results   Component Value Date    AST 59 (H) 02/09/2022    ALKPHOS 107 02/09/2022    BILITOT 1.8 (H) 02/09/2022     Albumin:   Albumin   Date Value Ref Range Status   02/09/2022 1.9 (L) 3.5 - 5.2 g/dL Final     Protein:   Total Protein   Date Value Ref Range Status   02/09/2022 4.8 (L) 6.0 - 8.4 g/dL Final     Lactic acid:   Lab Results   Component Value Date    LACTATE 1.8 02/05/2022    LACTATE 2.2 02/04/2022       Significant Imaging: None  > 50% of 25  min visit spent in chart review, face to face discussion of goals of care,  symptom assessment, coordination of care and emotional support    Kinjal Ellis, CNS  Palliative Medicine  [facility] - Intensive Care ([place] [address])

## 2022-02-09 NOTE — PROGRESS NOTES
Asystole on telemetry. Pt surrounded by family at bedside.  notified and to come to bedside for support. CCS notified of pt's death and to come to bedside to pronounce. Charge RN notified. All questions and concerns addressed with family and support provided.

## 2022-02-09 NOTE — ASSESSMENT & PLAN NOTE
--Home amlodipine currently on hold  --Consider starting BB for rate control if pt develops RVR given new onset a fib; would also benefit from GDMT given reduced EF

## 2022-02-09 NOTE — ASSESSMENT & PLAN NOTE
--Covid 19 positive on 1/26. Patient is not vaccinated.   --continue dexamethasone, remdesivir, received tocilizumab  --On full dose anticoagulation Given OSWALDO switched from therapeutic lovenox to Heparin  --currently requiring 15L non-breather, has had increasing oxygen requirements.

## 2022-02-09 NOTE — ASSESSMENT & PLAN NOTE
--no obvious source of infection; no leukocytosis and neg procal  --initially on vanc and zosyn. Now completing Unasyn  --follow up blood (NGTD), CSF (NGTD) and sputum cx  --concern for possible RLE ischemia (extremity cool to touch and unable to doppler pulse) -> LE arterial US negative  --resolved

## 2022-02-09 NOTE — ASSESSMENT & PLAN NOTE
--Covid 19 positive on 1/26. Patient is not vaccinated.   --currently requiring 15L non-breather, has had increasing oxygen requirements.   --transitioning to comfort measures

## 2022-02-09 NOTE — SIGNIFICANT EVENT
I was called to Mr. Samuel's bedside by nurse to pronounce their death.     My examination revealed absent heart and breath sounds.  There were no carotid and radial pulses.  His pupils were fixed and dilated. No corneal or pupillary reflexes.  Patient was unresponsive to noxious stimuli. Telemetry in asystole.     I pronounced patient dead at 1550. Family was at the bedside. Condolences offered and all questions answered.    Cause of death: Hypoxic respiratory failure     ANAND Email: [email or web address]

## 2022-02-09 NOTE — ASSESSMENT & PLAN NOTE
Palliative medicine consulted for end of life goals of care and hospice disussion    Mr. Samuel is an 87 yo gentleman admitted with COVID pneumonia and acute hypoxic respiratory failure receiving  remdesivir and dexamethazone.  Requiring high flow oxygen at 15 liters with 100% NRB mask with Sats 90 %. Appears agitated - moaning and screaming. Remains encephalopathic and unable   To participate in conversation.       Advance Care Planning     - No advanced care documents received.  - Legal next of kin for decision making is mert's wife Jamilah Samuel.    - Mr. Samuel is not decisional at this time.  - As per conversation with daughter, Rima Perez [phone], wife makes decisions with assistance from her daughters.    - Rima is a RN and had been designated as the family spokes person.  - No previous discussions regarding advanced care planning or goals of care have been held.  - Patient remains full code and current plan of care to be continued.     Goals of Care  - transitioned to full comfort measures.   -symptom management orders written per primary team  - family at bedside holding dao   -intense emotional support provided   - anointing of sick received

## 2022-02-09 NOTE — ASSESSMENT & PLAN NOTE
--CT head neg for acute intracranial abnormality  --LP unremarkable  --TSH WNL  --MRI showing acute embolic infarcts, vascular neuro consulted, recommended CTA or MRA however oxygen requirement increasig and OSWALDO, so held. Now DNR. Palliative following.   --Precedex gtt for acute agitation

## 2022-02-09 NOTE — ASSESSMENT & PLAN NOTE
--CT head neg for acute intracranial abnormality  --LP unremarkable  --TSH WNL  --MRI showing acute embolic infarcts, vascular neuro consulted, recommended CTA or MRA however oxygen requirement increasig and OSWALDO, so held. Now DNR. transitioning to comfort measures  --Precedex gtt for acute agitation

## 2022-02-09 NOTE — PLAN OF CARE
[facility] ([facility]  Discharge Final Note    Primary Care Provider: SHARRON Padilla MD    Expected Discharge Date: 2022  Mio Ferro MD   Resident   Critical Care Medicine   Significant Event       Cosign Needed Addendum   Creation Time:  2022  3:52 PM                       []Hide copied text    []Hover for details    I was called to Mr. Samuel's bedside by nurse to pronounce their death.     My examination revealed absent heart and breath sounds.  There were no carotid and radial pulses.  His pupils were fixed and dilated. No corneal or pupillary reflexes.  Patient was unresponsive to noxious stimuli. Telemetry in asystole.     I pronounced patient dead at 1550. Family was at the bedside. Condolences offered and all questions answered.     Cause of death: Hypoxic respiratory failure      ANAND Email: [email or web address]                   Final Discharge Note (most recent)     Final Note - 22 1642        Final Note    Assessment Type Final Discharge Note     Anticipated Discharge Disposition                Hilda Griffith LMSW  [facility]  X [id number]        Important Message from Medicare

## 2022-02-09 NOTE — ASSESSMENT & PLAN NOTE
Patient with Hypoxic Respiratory failure which is Acute.  he is not on home oxygen. Supplemental oxygen was provided and noted-  .   Signs/symptoms of respiratory failure include- tachypnea and increased work of breathing. Contributing diagnoses includes - CHF, Pneumonia and Covid-19 Labs and images were reviewed. Patient Has recent ABG, which has been reviewed. Will treat underlying causes and adjust management of respiratory failure as follows-     --currently requiring 15L non-rebreather  --transitioning to comfort measures    TTE 2/4/22:  · The left ventricle is normal in size with severely decreased systolic function. The estimated ejection fraction is 20%.  · Normal right ventricular size with moderately to severely reduced right ventricular systolic function.  · Left ventricular diastolic dysfunction.  · Biatrial enlargement.  · The ascending aorta is mildly dilated.  · Mild mitral regurgitation.  · Mild tricuspid regurgitation.  · The estimated PA systolic pressure is 51 mmHg.  · Intermediate central venous pressure (8 mmHg).    Echo with bubble did show intracardiac shunting

## 2022-02-09 NOTE — ASSESSMENT & PLAN NOTE
--new onset; suspect secondary to acute illness  --continue full dose anticoagulation  --low threshold for initiation of BB if in RVR    TTE 2/4/22:  · The left ventricle is normal in size with severely decreased systolic function. The estimated ejection fraction is 20%.  · Normal right ventricular size with moderately to severely reduced right ventricular systolic function.  · Left ventricular diastolic dysfunction.  · Biatrial enlargement.  · The ascending aorta is mildly dilated.  · Mild mitral regurgitation.  · Mild tricuspid regurgitation.  · The estimated PA systolic pressure is 51 mmHg.  · Intermediate central venous pressure (8 mmHg).    Echo bubble 2/7  · Severe left atrial enlargement.  · The left ventricle is normal in size with severely decreased systolic function.  · The estimated ejection fraction is 20%.  · Left ventricular diastolic dysfunction.  · Normal right ventricular size with mildly reduced right ventricular systolic function.  · Mild aortic regurgitation.  · Mild tricuspid regurgitation.  · Elevated central venous pressure (15 mmHg).  · The estimated PA systolic pressure is 40 mmHg.  · Study is positive for intracardiac shunt, Bubbles visualized in left atrium at 3 beats. Very sluggish flow noted in left atrium, thus in the setting of stroke and atrial fibrillation, would also consider KALI thrombus on differential for embolic stroke.  · Sluggish flow in left ventricle with systemic contrast, however no obvious thrombus visualized.

## 2022-02-09 NOTE — SUBJECTIVE & OBJECTIVE
Interval History/Significant Events: NAEON. Patient afebrile, on non re-breather. Continues on max precedex, Seroquel added for agitation. Continues to have intermittent moaning. Is DNR, palliative on board and family deciding next steps.     Review of Systems   Unable to perform ROS: Mental status change     Objective:     Vital Signs (Most Recent):  Temp: 97.7 °F (36.5 °C) (02/09/22 0701)  Pulse: 78 (02/09/22 0800)  Resp: 18 (02/09/22 0800)  BP: (!) 106/56 (02/09/22 0800)  SpO2: 99 % (02/09/22 0800) Vital Signs (24h Range):  Temp:  [97.7 °F (36.5 °C)-99 °F (37.2 °C)] 97.7 °F (36.5 °C)  Pulse:  [69-84] 78  Resp:  [17-47] 18  SpO2:  [89 %-100 %] 99 %  BP: ()/(55-94) 106/56   Weight: 69.9 kg (154 lb 1.6 oz)  Body mass index is 26.45 kg/m².      Intake/Output Summary (Last 24 hours) at 2/9/2022 0853  Last data filed at 2/9/2022 0800  Gross per 24 hour   Intake 2629.65 ml   Output 1380 ml   Net 1249.65 ml       Physical Exam  Vitals and nursing note reviewed.   Constitutional:       Appearance: He is ill-appearing.   HENT:      Head: Normocephalic and atraumatic.      Nose: Nose normal.   Eyes:      Conjunctiva/sclera: Conjunctivae normal.      Comments: R gaze   Cardiovascular:      Rate and Rhythm: Normal rate.   Pulmonary:      Effort: Respiratory distress present.      Comments: Now on re-breather 15 L, coarse breath sounds  Musculoskeletal:      Right lower leg: No edema.      Left lower leg: No edema.   Skin:     General: Skin is warm and dry.   Neurological:      Mental Status: He is lethargic.      Comments: R facial droop  Intermittent moaning, unable to follow commands  R hemiparesis  Moving L side spontaneuosly       Vents:     Lines/Drains/Airways     Drain                 Urethral Catheter 02/04/22 0504 Straight-tip 18 Fr. 5 days         NG/OG Tube 02/07/22 Right nostril 2 days          Peripheral Intravenous Line                 Peripheral IV - Single Lumen 02/05/22 2230 20 G Left Antecubital 3 days          Peripheral IV - Single Lumen 02/06/22 0000 20 G Anterior;Right Upper Arm 3 days              Significant Labs:    CBC/Anemia Profile:  Recent Labs   Lab 02/08/22  0402 02/09/22  0411   WBC 10.61 10.19   HGB 12.4* 12.4*   HCT 41.0 40.9   * 88*   MCV 80* 82   RDW 17.5* 17.5*        Chemistries:  Recent Labs   Lab 02/07/22  1727 02/08/22  0402 02/09/22 0411   * 151* 152*   K 3.2* 3.7 4.0   * 119* 122*   CO2 17* 19* 20*   BUN 83* 96* 98*   CREATININE 1.9* 2.1* 1.7*   CALCIUM 6.8* 7.6* 7.9*   ALBUMIN  --  1.9* 1.9*   PROT  --  4.9* 4.8*   BILITOT  --  1.5* 1.8*   ALKPHOS  --  99 107   ALT  --  26 29   AST  --  57* 59*   MG  --  2.7* 2.9*   PHOS  --  4.9* 4.0       Significant Imaging:  I have reviewed all pertinent imaging results/findings within the past 24 hours.

## 2022-02-09 NOTE — ASSESSMENT & PLAN NOTE
Patient with new onset a-fib, covid positive, acutely encephalopathic. MRI 2/5 revealed findings suggestive of multifocal acute embolic infarcts, as well as a small acute infarction in the right inferior cerebellum. Vascular neuro consulted, recommended CTA head and neck. However, patient has had increasing oxygen requirements as well as developed an OSWALDO. Spoke with vascular neuro and will hold on CTA for now.     -BP <220  - transitioning to comfort measures

## 2022-02-09 NOTE — ASSESSMENT & PLAN NOTE
Patient with Hypoxic Respiratory failure which is Acute.  he is not on home oxygen. Supplemental oxygen was provided and noted-  .   Signs/symptoms of respiratory failure include- tachypnea and increased work of breathing. Contributing diagnoses includes - CHF, Pneumonia and Covid-19 Labs and images were reviewed. Patient Has recent ABG, which has been reviewed. Will treat underlying causes and adjust management of respiratory failure as follows-     --Likely multifactorial in setting of Covid-19 infection and suspected HF (elevated BNP with reduced LVEF on bedside echo)  --Continue tx for Covid (see above);   --x1 dose lasix on 2/4 with good UOP  --lower suspicion for superimposed bacterial infection (no leukocytosis and neg procal), however was on broad spectrum antibiotics vanc and zosyn. Given worsening kidney infection and low suspicion of bacterial infection without positive BC vanc was dc'd.   --f/u blood and sputum cx  --currently requiring 15L non-rebreather    TTE 2/4/22:  · The left ventricle is normal in size with severely decreased systolic function. The estimated ejection fraction is 20%.  · Normal right ventricular size with moderately to severely reduced right ventricular systolic function.  · Left ventricular diastolic dysfunction.  · Biatrial enlargement.  · The ascending aorta is mildly dilated.  · Mild mitral regurgitation.  · Mild tricuspid regurgitation.  · The estimated PA systolic pressure is 51 mmHg.  · Intermediate central venous pressure (8 mmHg).    Echo with bubble did show intracardiac shunting

## 2022-02-09 NOTE — ASSESSMENT & PLAN NOTE
Patient Cr increasing, from 1.0 to 2.1 now coming down to 1.7. Likely contrast induced or pre renal.    - avoid nephrotoxic agents  -renally dose medications  - Holding CTA head and neck  - DC vanc  - switched therapeutic lovenox to heparin gtt given worsening kidney function  - free water 300 changed from qid to q4h 2/8/22

## 2022-02-09 NOTE — ASSESSMENT & PLAN NOTE
--new onset; suspect secondary to acute illness  --continue full dose anticoagulation  --low threshold for initiation of BB if in RVR    TTE 2/4/22:  · The left ventricle is normal in size with severely decreased systolic function. The estimated ejection fraction is 20%.  · Normal right ventricular size with moderately to severely reduced right ventricular systolic function.  · Left ventricular diastolic dysfunction.  · Biatrial enlargement.  · The ascending aorta is mildly dilated.  · Mild mitral regurgitation.  · Mild tricuspid regurgitation.  · The estimated PA systolic pressure is 51 mmHg.  · Intermediate central venous pressure (8 mmHg).    Echo bubble 2/7  · Severe left atrial enlargement.  · The left ventricle is normal in size with severely decreased systolic function.  · The estimated ejection fraction is 20%.  · Left ventricular diastolic dysfunction.  · Normal right ventricular size with mildly reduced right ventricular systolic function.  · Mild aortic regurgitation.  · Mild tricuspid regurgitation.  · Elevated central venous pressure (15 mmHg).  · The estimated PA systolic pressure is 40 mmHg.  · Study is positive for intracardiac shunt, Bubbles visualized in left atrium at 3 beats. Very sluggish flow noted in left atrium, thus in the setting of stroke and atrial fibrillation, would also consider KALI thrombus on differential for embolic stroke.  · Sluggish flow in left ventricle with systemic contrast, however no obvious thrombus visualized.           History  Chief Complaint   Patient presents with    Abdominal Pain     Patient c/o abdomen pain that is making her feel "9 months pregnant" also c/o left sided flank pain and bloating  80-year-old female presents complaining of epigastric pain which began yesterday at 1800 hours  Patient states she began have the epigastric pain prior to eating and intensified after eating  The pain has been continuous since that time  It has been waxing waning  She states that is a 5/10 right now in the epigastric region  Patient denies chest pain or shortness of breath  She states that today while she was at work she felt as if her short was too tight because her epigastric region was swollen  Patient had 2 pieces of toast this morning and states she had salad for lunch  History provided by:  Patient  Abdominal Pain   Pain location:  Epigastric  Pain quality: aching, bloating, cramping and fullness    Pain radiates to:  Does not radiate  Pain severity:  Mild  Onset quality:  Gradual  Duration:  23 hours  Timing:  Constant  Progression:  Waxing and waning  Context: not alcohol use, not awakening from sleep, not diet changes and not eating    Relieved by:  Nothing  Worsened by:  Nothing  Ineffective treatments:  None tried  Associated symptoms: no anorexia, no belching, no chest pain, no chills, no constipation, no cough, no diarrhea and no dysuria    Risk factors: no alcohol abuse and no aspirin use        Prior to Admission Medications   Prescriptions Last Dose Informant Patient Reported? Taking?    ALPRAZolam (XANAX) 0 25 mg tablet   No Yes   Sig: Take 1 tablet (0 25 mg total) by mouth daily at bedtime as needed for anxiety   ergocalciferol (VITAMIN D2) 50,000 units   No Yes   Sig: Take 1 capsule (50,000 Units total) by mouth once a week   ibuprofen (MOTRIN) 600 mg tablet   No Yes   Sig: Take 1 tablet (600 mg total) by mouth every 6 (six) hours as needed (pain, fever)   levothyroxine 75 mcg tablet   No Yes Sig: Take 1 tablet (75 mcg total) by mouth daily   omeprazole (PriLOSEC) 20 mg delayed release capsule Not Taking at Unknown time Self No No   Sig: Take 1 capsule (20 mg total) by mouth daily   Patient not taking: Reported on 7/17/2019      Facility-Administered Medications: None       Past Medical History:   Diagnosis Date    Cancer ([facility] )     Uterine cancer ([facility] )        Past Surgical History:   Procedure Laterality Date    BREAST SURGERY      CHOLECYSTECTOMY      HYSTERECTOMY      KNEE SURGERY         History reviewed  No pertinent family history  I have reviewed and agree with the history as documented  Social History     Tobacco Use    Smoking status: Never Smoker    Smokeless tobacco: Never Used   Substance Use Topics    Alcohol use: Not Currently     Comment: socially    Drug use: No        Review of Systems   Constitutional: Negative for chills  HENT: Negative  Negative for congestion and dental problem  Eyes: Negative  Negative for pain, discharge and itching  Respiratory: Negative for cough  Cardiovascular: Negative for chest pain  Gastrointestinal: Positive for abdominal pain  Negative for anorexia, constipation and diarrhea  Endocrine: Negative  Negative for cold intolerance and heat intolerance  Genitourinary: Negative  Negative for difficulty urinating, dyspareunia and dysuria  Musculoskeletal: Negative  Negative for arthralgias, back pain and gait problem  Skin: Negative  Negative for color change, pallor and rash  Allergic/Immunologic: Negative  Negative for environmental allergies  Neurological: Negative  Negative for dizziness, facial asymmetry and headaches  Hematological: Negative  Negative for adenopathy  Psychiatric/Behavioral: Negative  Negative for agitation, behavioral problems and confusion  All other systems reviewed and are negative  Physical Exam  Physical Exam   Constitutional: She appears well-developed  Non-toxic appearance  HENT:   Head: Normocephalic  Mouth/Throat: Oropharynx is clear and moist    Eyes: Pupils are equal, round, and reactive to light  Cardiovascular: Normal rate  Exam reveals no friction rub  No murmur heard  Abdominal: She exhibits no distension and no ascites  There is tenderness in the epigastric area  There is no rigidity and no CVA tenderness  Neurological: She is alert  Skin: Capillary refill takes less than 2 seconds  She is not diaphoretic  No cyanosis  Psychiatric: She has a normal mood and affect  Vitals reviewed        Vital Signs  ED Triage Vitals [07/17/19 1638]   Temperature Pulse Respirations Blood Pressure SpO2   98 6 °F (37 °C) 76 19 124/79 98 %      Temp Source Heart Rate Source Patient Position - Orthostatic VS BP Location FiO2 (%)   Temporal Monitor Sitting Left arm --      Pain Score       4           Vitals:    07/17/19 1638 07/17/19 1800 07/17/19 1830   BP: 124/79 127/66 122/73   Pulse: 76 55 55   Patient Position - Orthostatic VS: Sitting Sitting Sitting         Visual Acuity      ED Medications  Medications   sodium chloride 0 9 % infusion (125 mL/hr Intravenous New Bag 7/17/19 1854)   levofloxacin (LEVAQUIN) IVPB (premix) 750 mg (750 mg Intravenous New Bag 7/17/19 1854)   sodium chloride 0 9 % bolus 1,000 mL (0 mL Intravenous Stopped 7/17/19 1844)   ondansetron (ZOFRAN) injection 4 mg (4 mg Intravenous Given 7/17/19 1709)   ketorolac (TORADOL) injection 15 mg (15 mg Intravenous Given 7/17/19 1710)   iohexol (OMNIPAQUE) 350 MG/ML injection (SINGLE-DOSE) 100 mL (100 mL Intravenous Given 7/17/19 1802)   ondansetron (ZOFRAN) injection 4 mg (4 mg Intravenous Given 7/17/19 1849)   fentanyl citrate (PF) 100 MCG/2ML 25 mcg (25 mcg Intravenous Given 7/17/19 1850)       Diagnostic Studies  Results Reviewed     Procedure Component Value Units Date/Time    Urine Microscopic [[id number]]  (Abnormal) Collected:  07/17/19 1738    Lab Status:  Final result Specimen:  Urine, Clean Catch Updated:  07/17/19 1850     RBC, UA None Seen /hpf      WBC, UA 2-4 /hpf      Epithelial Cells Occasional /hpf      Bacteria, UA Occasional /hpf     UA w Reflex to Microscopic w Reflex to Culture [[id number]]  (Abnormal) Collected:  07/17/19 1738    Lab Status:  Final result Specimen:  Urine, Clean Catch Updated:  07/17/19 1800     Color, UA Yellow     Clarity, UA Clear     Specific Gravity, UA 1 010     pH, UA 6 5     Leukocytes, UA Trace     Nitrite, UA Negative     Protein, UA Negative mg/dl      Glucose, UA Negative mg/dl      Ketones, UA Negative mg/dl      Urobilinogen, UA 0 2 E U /dl      Bilirubin, UA Negative     Blood, UA Negative    TSH [[id number]]  (Normal) Collected:  07/17/19 1708    Lab Status:  Final result Specimen:  Blood from Arm, Left Updated:  07/17/19 1746     TSH 3RD GENERATON 2 038 uIU/mL     Narrative:       Patients undergoing fluorescein dye angiography may retain small amounts of fluorescein in the body for 48-72 hours post procedure  Samples containing fluorescein can produce falsely depressed TSH values  If the patient had this procedure,a specimen should be resubmitted post fluorescein clearance  Troponin I [[id number]]  (Normal) Collected:  07/17/19 1708    Lab Status:  Final result Specimen:  Blood from Arm, Left Updated:  07/17/19 1740     Troponin I <0 02 ng/mL     Lactic acid, plasma [[id number]]  (Normal) Collected:  07/17/19 1708    Lab Status:  Final result Specimen:  Blood from Arm, Left Updated:  07/17/19 1740     LACTIC ACID 1 1 mmol/L     Narrative:       Result may be elevated if tourniquet was used during collection      Comprehensive metabolic panel [[id number]]  (Abnormal) Collected:  07/17/19 1708    Lab Status:  Final result Specimen:  Blood from Arm, Left Updated:  07/17/19 1737     Sodium 139 mmol/L      Potassium 3 6 mmol/L      Chloride 103 mmol/L      CO2 30 mmol/L      ANION GAP 6 mmol/L      BUN 13 mg/dL      Creatinine 0 84 mg/dL      Glucose 97 mg/dL Calcium 8 9 mg/dL      AST 13 U/L      ALT 20 U/L      Alkaline Phosphatase 93 U/L      Total Protein 7 1 g/dL      Albumin 3 4 g/dL      Total Bilirubin 1 00 mg/dL      eGFR 83 ml/min/1 73sq m     Narrative:       [place] guidelines for Chronic Kidney Disease (CKD):     Stage 1 with normal or high GFR (GFR > 90 mL/min/1 73 square meters)    Stage 2 Mild CKD (GFR = 60-89 mL/min/1 73 square meters)    Stage 3A Moderate CKD (GFR = 45-59 mL/min/1 73 square meters)    Stage 3B Moderate CKD (GFR = 30-44 mL/min/1 73 square meters)    Stage 4 Severe CKD (GFR = 15-29 mL/min/1 73 square meters)    Stage 5 End Stage CKD (GFR <15 mL/min/1 73 square meters)  Note: GFR calculation is accurate only with a steady state creatinine    Lipase [[id number]]  (Abnormal) Collected:  07/17/19 1708    Lab Status:  Final result Specimen:  Blood from Arm, Left Updated:  07/17/19 1733     Lipase 600 u/L     Protime-INR [[id number]]  (Normal) Collected:  07/17/19 1708    Lab Status:  Final result Specimen:  Blood from Arm, Left Updated:  07/17/19 1730     Protime 13 0 seconds      INR 0 98    APTT [[id number]]  (Normal) Collected:  07/17/19 1708    Lab Status:  Final result Specimen:  Blood from Arm, Left Updated:  07/17/19 1730     PTT 31 seconds     CBC and differential [[id number]] Collected:  07/17/19 1708    Lab Status:  Final result Specimen:  Blood from Arm, Left Updated:  07/17/19 1718     WBC 7 57 Thousand/uL      RBC 4 19 Million/uL      Hemoglobin 12 6 g/dL      Hematocrit 38 3 %      MCV 91 fL      MCH 30 1 pg      MCHC 32 9 g/dL      RDW 12 2 %      MPV 10 5 fL      Platelets 031 Thousands/uL      nRBC 0 /100 WBCs      Neutrophils Relative 59 %      Immat GRANS % 0 %      Lymphocytes Relative 29 %      Monocytes Relative 8 %      Eosinophils Relative 3 %      Basophils Relative 1 %      Neutrophils Absolute 4 54 Thousands/µL      Immature Grans Absolute 0 02 Thousand/uL      Lymphocytes Absolute 2 16 Thousands/µL      Monocytes Absolute 0 61 Thousand/µL      Eosinophils Absolute 0 19 Thousand/µL      Basophils Absolute 0 05 Thousands/µL                  CT abdomen pelvis with contrast   Final Result by Hua Maynard MD (07/17 1831)      Acute pancreatitis  Intrauterine mass possibly representing fibroid, correlate with nonemergent outpatient pelvic ultrasound  Workstation performed: [id number]                    Procedures  Procedures       ED Course         HEART Risk Score      Most Recent Value   History  1 Filed at: 07/17/2019 1804   ECG  1 Filed at: 07/17/2019 1804   Age  1 Filed at: 07/17/2019 1804   Risk Factors  0 Filed at: 07/17/2019 1804   Troponin  0 Filed at: 07/17/2019 1804   Heart Score Risk Calculator   History  1 Filed at: 07/17/2019 1804   ECG  1 Filed at: 07/17/2019 1804   Age  1 Filed at: 07/17/2019 1804   Risk Factors  0 Filed at: 07/17/2019 1804   Troponin  0 Filed at: 07/17/2019 1804   HEART Score  3 Filed at: 07/17/2019 1804   HEART Score  3 Filed at: 07/17/2019 1804                            MDM  Number of Diagnoses or Management Options  Diagnosis management comments: Differential diagnosis 1  Pancreatitis 2  Colitis 3   Gastritis       Amount and/or Complexity of Data Reviewed  Clinical lab tests: ordered and reviewed  Tests in the radiology section of CPT®: ordered and reviewed  Tests in the medicine section of CPT®: reviewed    Risk of Complications, Morbidity, and/or Mortality  Presenting problems: high  Diagnostic procedures: high  Management options: high        Disposition  Final diagnoses:   Acute pancreatitis   Nausea   Hypothyroidism   Gastroesophageal reflux disease without esophagitis   UTI (urinary tract infection)     Time reflects when diagnosis was documented in both MDM as applicable and the Disposition within this note     Time User Action Codes Description Comment    7/17/2019  6:45 PM Sierra Moreno Add [K85 90] Acute pancreatitis     7/17/2019 6:45 PM Junita Gutierrez Add [R11 0] Nausea     7/17/2019  6:45 PM Junita Gutierrez Add [E03 9] Hypothyroidism     7/17/2019  6:45 PM Junita Gutierrez Add [K21 9] Gastroesophageal reflux disease without esophagitis     7/17/2019  6:48 PM Judy Pickard Add [N39 0] UTI (urinary tract infection)       ED Disposition     ED Disposition Condition Date/Time Comment    Admit Stable Wed Jul 17, 2019  6:45 PM       Follow-up Information    None         Patient's Medications   Discharge Prescriptions    No medications on file     No discharge procedures on file      ED Provider  Electronically Signed by           Sridhar Ayala DO  07/17/19 3341

## 2022-02-09 NOTE — MEDICAL/APP STUDENT
"[facility] [place]  Medical Student Progress Note      Lalo Samuel  YOB: 1933  Medical Record Number:  [id number]  Attending Physician:  Celena Cai MD   Date of Admission: 2/3/2022       Hospital Day:  6  Current Principal Problem:  Acute hypoxemic respiratory failure      History     Cc: altered mental status    HPI  Mr. Samuel is a 89 yo M w/ a history of HTN and HLD who presents today for SOB. History was obtained from patient's daughter and ER records due to patient being acutely encephalopathic. Daughter states patient tested COVID-19 (+) 1/26. At time of positive test, he endorsed cough, congestion, post sinus drip. Symptoms improved until approximately 3 days prior when he began having increased SOB and fatigue. Family also reports symptoms consistent with hypoactive delirium (non-verbal, "blank" stare), as well as decreased oral intake and a few episodes of diarrhea. Patient's family called EMS twice but patient refused. Today, patient became acutely confused and agitated with labored breathing which prompted family to call EMS again. Of note patient is not vaccinated against COVID-19. He lives with his wife (who is vaccinated) and daughter who is not vaccinated. At baseline, he carries out ADLs independently.      In the ED, patient tachycardic, tachypneic but afebrile. Initially hypoxic w/ improved SpO2 94% on 5L NC. On examination, patient ill appearing, extremely agitated, and w/ increased work of breathing - using accessory muscles and intermittent pursed lip breathing. Labs notable for increased inflammatory markers - ferritin 877, , . Lactate 3.9. No leukocytosis, procal wnl. COVID-19 (+). CXR w/ significant patchy b/l airspace opacities concerning for post viral PNA vs pulmonary edema. CTH (-). Trop 0.075. EKG showing Afib w/ RVR. Received 1 x IV metoprolol 5mg w/ improvement of HR to ~110. Patient received 1.5L IVFs in the ED, broad spectrum " abx w/ vanc and zosyn and 1 x IV dexamethasone 6mg. He was admitted to hospital medicine for further management; however, MICU consulted for uptrending lactate and acute hypoxemic respiratory failure.    MICU Consulted for Acute Hypoxemic Respiratory Failure in the setting of COVID-19           Admitted to MICU for AMS and acute hypoxic respiratory failure 2/2 COVID. CTH unremarkable. LP with no signs of infection. Procal negative. Placed on COVID PNA protocol. With Remdesivir, Dexamethasone, and Baricitinib. CT Chest with diffuse patchy ground glass and consolidation. On broad spectrum Vanc and Zosyn. MRI Brain showing multiple acute embolic infarcts, vascular neuro consulted recommending CTA head and neck, however, patient has OSWALDO (likely 2/2 to contrast or pre renal) and increasing oxygen requirements, held off. Patient had increased oxygen requirements resulting in requiring non re-breather. Seroquel added for agitation. Palliative met with family, patient now DNR. Family has decided to transition to comfort measures, and will be coming in.          Today, he remains encephalopathic. Family has chosen to move forward with comfort cafe vs. Aggressive treatment and will come in for a family meeting today. Palliative care is onboard and will meet with family with the MICU team.      Medications  Scheduled Meds:   glycopyrrolate  0.2 mg Intravenous QID    mupirocin   Nasal BID    QUEtiapine  25 mg Per OG tube BID     Continuous Infusions:   dexmedetomidine (PRECEDEX) infusion 1.4 mcg/kg/hr (02/09/22 1000)    morphine       PRN Meds:.acetaminophen, albuterol **AND** [CANCELED] MDI Q4H PRN, benzonatate, dextrose 10%, dextrose 10%, glucagon (human recombinant), glucose, glucose, lorazepam, LORazepam, morphine, naloxone, sodium chloride 0.9%      PSFH:  Please see admission note and assessment and plan below.      ROS unable to be performed: acute encephalopathy    Physical Examination     General:  Patient looks  acutely unwell, RASS +1     Vital Signs  Vitals  Temp: 97.7 °F (36.5 °C)  Temp src: Axillary  Pulse: 73  Heart Rate Source: Monitor,Continuous  Resp: (!) 30  SpO2: 95 %  Pulse Oximetry Type: Continuous  Flow (L/min): 15  O2 Device (Oxygen Therapy): Non Rebreather Mask (+15L HFNC)  BP: 94/60  MAP (mmHg): 72  BP Location: Right arm  BP Method: Automatic  Patient Position: Lying          24 Hour VS Range    Temp:  [97.7 °F (36.5 °C)-99 °F (37.2 °C)]   Pulse:  [69-84]   Resp:  [17-47]   BP: ()/(55-94)   SpO2:  [89 %-100 %]     Intake/Output Summary (Last 24 hours) at 2/9/2022 1015  Last data filed at 2/9/2022 1000  Gross per 24 hour   Intake 2477.46 ml   Output 1380 ml   Net 1097.46 ml       Head: NCAT  Eyes: No scleral icterus.  ENMT:  no gingival bleeding, normal oral mucosa without pallor or cyanosis.   Neck:  JVP normal.  Trachea non-displaced.     Chest:  Increased respiratory effort with pursed lip breathing. Coarse breath sounds.  Heart:  Normal PMI, S1 S2 normal quality, splitting.  No murmurs rubs or gallops.  Peripheral pulses 2+.  Abdomen:  Non-distended, normal bowel sounds, non-tender.  No hepato-jugular reflux.  Extremities:  No edema. Normal capillary refill.    Skin:  Warm and dry.  No cyanosis or pallor.  No ulcers, stasis.  IV sites without tenderness or inflammation.    Neurological / Psychiatric:  Disoriented, noncommunicative, GCS 8 (E1V2M4), RASS +1.            Data       Recent Labs   Lab 02/06/22  0400 02/06/22  1100 02/07/22  0444 02/08/22  0402 02/09/22  0411   WBC 17.81* 18.36* 10.77 10.61 10.19   HGB 13.8* 13.2* 12.2* 12.4* 12.4*   HCT 44.7 42.3 40.3 41.0 40.9    178 101* 106* 88*        Recent Labs   Lab 02/06/22  1100   INR 1.6*        Recent Labs   Lab 02/06/22  0400 02/07/22  0444 02/07/22  1727 02/08/22  0402 02/09/22  0411   * 154* 151* 151* 152*   K 3.9 4.1 3.2* 3.7 4.0   * 121* 123* 119* 122*   CO2 21* 17* 17* 19* 20*   BUN 69* 88* 83* 96* 98*   CREATININE  1.6* 1.9* 1.9* 2.1* 1.7*   ANIONGAP 14 16 11 13 10   CALCIUM 8.6* 8.0* 6.8* 7.6* 7.9*        Recent Labs   Lab 02/05/22  0347 02/06/22  0400 02/07/22  0444 02/08/22  0402 02/09/22  0411   PROT 4.8* 5.9* 5.0* 4.9* 4.8*   ALBUMIN 2.2* 2.4* 2.0* 1.9* 1.9*   BILITOT 1.7* 1.9* 1.8* 1.5* 1.8*   ALKPHOS 90 150* 91 99 107   AST 69* 81* 69* 57* 59*   ALT 24 28 25 26 29        Recent Labs   Lab 02/03/22 2122 02/04/22  0009   TROPONINI 0.075* 0.014        BNP (pg/mL)   Date Value   02/03/2022 693 (H)       Recent Labs   Lab 02/03/22 2058   LABBLOO No growth after 5 days.  No growth after 5 days.              Assessment & Plan   87yo M with HTN, HLD who was in his usual state of health until cough, congestion, and postnasal drip and subsequent positive Covid test on 01/26. Has not been vaccinated. His SOB began to worsen, accompanied by decreased oral intake, diarrhea, and altered mental status per family. Last friday, he became agitated with labored breathing and family activated EMS. In ED, he was found to be in new-onset AF and had increased oxygen requirements.  Received 1 x IV metoprolol 5mg w/ improvement of HR to ~110 and has been controlling his own rate since.     1. 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 TSH WNL. Vascular neuro recommended CTA or MRA, held in setting of increasing oxygen requirements and OSWALDO.  Today, RASS +1, GCS 8. Moving forward with comfort care per patient's family with plan to meet today.     Plan:  - precedex gtt   - lorzepam  - scheduled morphine 2mg q2  - morphine gtt  - Palliative care onboard       2. Acute hypoxemic respiratory failure 2/2 pneumonia  Patient had no history of lung disease or oxygen requirements until testing positive for COVID on 01/26 and worsening dyspnea, GIT, and encephalpathy since. Today, on 15L NRB and satting 95+  Likely 2/2 covid-19 pneumonia, vs. CAP bugs.     Plan:  - d/c antibiotics  and steroids per palliative    3. Hypernatremia  Na stable ~150s.    Plan:  - d/c free water flushes per palliative      Sara Melendez, MS4  [facility] [place]

## 2022-02-09 NOTE — PLAN OF CARE
CMICU DAILY GOALS       A: Awake    RASS: Goal - RASS Goal: -2-->light sedation  Actual - RASS (Martin Agitation-Sedation Scale): 1-->restless   Restraint necessity: Clinical Justification: Treatment Interference,Removing medical devices  B: Breathe   SBT: Not intubated   C: Coordinate A & B, analgesics/sedatives   Pain: managed    SAT: Not intubated  D: Delirium   CAM-ICU: Overall CAM-ICU: Positive  E: Early(intubated/ Progressive (non-intubated) Mobility   MOVE Screen: Fail   Activity: Activity Management: Arm raise - L1  FAS: Feeding/Nutrition   Diet order: Diet/Nutrition Received: NPO,    T: Thrombus   DVT prophylaxis: VTE Required Core Measure: Pharmacological prophylaxis initiated/maintained  H: HOB Elevation   Head of Bed (HOB) Positioning: HOB at 30-45 degrees  U: Ulcer Prophylaxis   GI: yes  G: Glucose control   managed    S: Skin   Bathing/Skin Care: bath, complete,dressed/undressed,electrode patches/site rotation,incontinence care,back care,linen changed,shaved face  Device Skin Pressure Protection: absorbent pad utilized/changed,adhesive use limited,positioning supports utilized,pressure points protected,skin-to-skin areas padded,skin-to-device areas padded  Pressure Reduction Devices: heel offloading device utilized,positioning supports utilized,specialty bed utilized,foam padding utilized  Pressure Reduction Techniques: weight shift assistance provided  Skin Protection: adhesive use limited,skin-to-device areas padded,skin-to-skin areas padded,tubing/devices free from skin contact,incontinence pads utilized  B: Bowel Function   no issues   I: Indwelling Catheters   Valenzuela necessity:      Urethral Catheter 02/04/22 0504 Straight-tip 18 Fr.-Reason for Continuing Urinary Catheterization: Critically ill in ICU and requiring hourly monitoring of intake/output   CVC necessity: No  D: De-escalation Antibiotics   Yes    Family/Goals of care/Code Status   Code Status: DNR    24H Vital Sign Range  Temp:  [96 °F  (35.6 °C)-99 °F (37.2 °C)]   Pulse:  [66-84]   Resp:  [17-47]   BP: ()/(55-94)   SpO2:  [88 %-100 %]      Shift Events   No acute events throughout shift    VS and assessment per flow sheet, patient progressing towards goals as tolerated, plan of care reviewed with family, all concerns addressed, will continue to monitor.

## 2022-02-09 NOTE — PROGRESS NOTES
"[facility] ([place] [id number])  Critical Care Medicine  Progress Note    Patient Name: Lalo Samuel  MRN: [id number]  Admission Date: 2/3/2022  Hospital Length of Stay: 6 days  Code Status: DNR  Attending Provider: Celena Cai MD  Primary Care Provider: SHARRON Padilla MD   Principal Problem: Acute hypoxemic respiratory failure    Subjective:     HPI:  Mr. Samuel is a 89 yo M w/ a history of HTN and HLD who presents today for SOB. History was obtained from patient's daughter and ER records due to patient being acutely encephalopathic. Daughter states patient tested COVID-19 (+) 1/26. At time of positive test, he endorsed cough, congestion, post sinus drip. Symptoms improved until approximately 3 days prior when he began having increased SOB and fatigue. Family also reports symptoms consistent with hypoactive delirium (non-verbal, "blank" stare), as well as decreased oral intake and a few episodes of diarrhea. Patient's family called EMS twice but patient refused. Today, patient became acutely confused and agitated with labored breathing which prompted family to call EMS again. Of note patient is not vaccinated against COVID-19. He lives with his wife (who is vaccinated) and daughter who is not vaccinated. At baseline, he carries out ADLs independently.      In the ED, patient tachycardic, tachypneic but afebrile. Initially hypoxic w/ improved SpO2 94% on 5L NC. On examination, patient ill appearing, extremely agitated, and w/ increased work of breathing - using accessory muscles and intermittent pursed lip breathing. Labs notable for increased inflammatory markers - ferritin 877, , . Lactate 3.9. No leukocytosis, procal wnl. COVID-19 (+). CXR w/ significant patchy b/l airspace opacities concerning for post viral PNA vs pulmonary edema. CTH (-). Trop 0.075. EKG showing Afib w/ RVR. Received 1 x IV metoprolol 5mg w/ improvement of HR to ~110. Patient received 1.5L IVFs in the ED, " broad spectrum abx w/ vanc and zosyn and 1 x IV dexamethasone 6mg. He was admitted to hospital medicine for further management; however, MICU consulted for uptrending lactate and acute hypoxemic respiratory failure.    MICU Consulted for Acute Hypoxemic Respiratory Failure in the setting of COVID-19          Hospital/ICU Course:  Admitted to MICU for AMS and acute hypoxic respiratory failure 2/2 COVID. CTH unremarkable. LP with no signs of infection. Procal negative. Placed on COVID PNA protocol. With Remdesivir, Dexamethasone, and Baricitinib. CT Chest with diffuse patchy ground glass and consolidation. On broad spectrum Vanc and Zosyn. MRI Brain showing multiple acute embolic infarcts, vascular neuro consulted recommending CTA head and neck, however, patient has OSWALDO (likely 2/2 to contrast or pre renal) and increasing oxygen requirements, held off. Patient had increased oxygen requirements resulting in requiring non re-breather. Seroquel added for agitation. Palliative met with family, patient now DNR. Family has decided to transition to comfort measures, and will be coming in.          Interval History/Significant Events: NAEON. Patient afebrile, on non re-breather. Continues on max precedex, Seroquel added for agitation. Continues to have intermittent moaning. Is DNR, palliative on board and family deciding next steps.     Review of Systems   Unable to perform ROS: Mental status change     Objective:     Vital Signs (Most Recent):  Temp: 97.7 °F (36.5 °C) (02/09/22 0701)  Pulse: 78 (02/09/22 0800)  Resp: 18 (02/09/22 0800)  BP: (!) 106/56 (02/09/22 0800)  SpO2: 99 % (02/09/22 0800) Vital Signs (24h Range):  Temp:  [97.7 °F (36.5 °C)-99 °F (37.2 °C)] 97.7 °F (36.5 °C)  Pulse:  [69-84] 78  Resp:  [17-47] 18  SpO2:  [89 %-100 %] 99 %  BP: ()/(55-94) 106/56   Weight: 69.9 kg (154 lb 1.6 oz)  Body mass index is 26.45 kg/m².      Intake/Output Summary (Last 24 hours) at 2/9/2022 0853  Last data filed at 2/9/2022  0800  Gross per 24 hour   Intake 2629.65 ml   Output 1380 ml   Net 1249.65 ml       Physical Exam  Vitals and nursing note reviewed.   Constitutional:       Appearance: He is ill-appearing.   HENT:      Head: Normocephalic and atraumatic.      Nose: Nose normal.   Eyes:      Conjunctiva/sclera: Conjunctivae normal.      Comments: R gaze   Cardiovascular:      Rate and Rhythm: Normal rate.   Pulmonary:      Effort: Respiratory distress present.      Comments: Now on re-breather 15 L, coarse breath sounds  Musculoskeletal:      Right lower leg: No edema.      Left lower leg: No edema.   Skin:     General: Skin is warm and dry.   Neurological:      Mental Status: He is lethargic.      Comments: R facial droop  Intermittent moaning, unable to follow commands  R hemiparesis  Moving L side spontaneuosly       Vents:     Lines/Drains/Airways     Drain                 Urethral Catheter 02/04/22 0504 Straight-tip 18 Fr. 5 days         NG/OG Tube 02/07/22 Right nostril 2 days          Peripheral Intravenous Line                 Peripheral IV - Single Lumen 02/05/22 2230 20 G Left Antecubital 3 days         Peripheral IV - Single Lumen 02/06/22 0000 20 G Anterior;Right Upper Arm 3 days              Significant Labs:    CBC/Anemia Profile:  Recent Labs   Lab 02/08/22  0402 02/09/22  0411   WBC 10.61 10.19   HGB 12.4* 12.4*   HCT 41.0 40.9   * 88*   MCV 80* 82   RDW 17.5* 17.5*        Chemistries:  Recent Labs   Lab 02/07/22  1727 02/08/22  0402 02/09/22  0411   * 151* 152*   K 3.2* 3.7 4.0   * 119* 122*   CO2 17* 19* 20*   BUN 83* 96* 98*   CREATININE 1.9* 2.1* 1.7*   CALCIUM 6.8* 7.6* 7.9*   ALBUMIN  --  1.9* 1.9*   PROT  --  4.9* 4.8*   BILITOT  --  1.5* 1.8*   ALKPHOS  --  99 107   ALT  --  26 29   AST  --  57* 59*   MG  --  2.7* 2.9*   PHOS  --  4.9* 4.0       Significant Imaging:  I have reviewed all pertinent imaging results/findings within the past 24 hours.      [facility]  Recent Labs   Lab 02/04/22  5950    PH 7.399   PO2 60*   PCO2 37.1   HCO3 23.0*   BE -2     Assessment/Plan:     Neuro  Acute arterial ischemic stroke, multifocal, multiple vascular territories  Patient with new onset a-fib, covid positive, acutely encephalopathic. MRI 2/5 revealed findings suggestive of multifocal acute embolic infarcts, as well as a small acute infarction in the right inferior cerebellum. Vascular neuro consulted, recommended CTA head and neck. However, patient has had increasing oxygen requirements as well as developed an OSWALDO. Spoke with vascular neuro and will hold on CTA for now.     -BP <220  - transitioning to comfort measures       Acute metabolic encephalopathy    --CT head neg for acute intracranial abnormality  --LP unremarkable  --TSH WNL  --MRI showing acute embolic infarcts, vascular neuro consulted, recommended CTA or MRA however oxygen requirement increasig and OSWALDO, so held. Now DNR. transitioning to comfort measures  --Precedex gtt for acute agitation       Pulmonary  * Acute hypoxemic respiratory failure  Patient with Hypoxic Respiratory failure which is Acute.  he is not on home oxygen. Supplemental oxygen was provided and noted-  .   Signs/symptoms of respiratory failure include- tachypnea and increased work of breathing. Contributing diagnoses includes - CHF, Pneumonia and Covid-19 Labs and images were reviewed. Patient Has recent ABG, which has been reviewed. Will treat underlying causes and adjust management of respiratory failure as follows-     --currently requiring 15L non-rebreather  --transitioning to comfort measures    TTE 2/4/22:  · The left ventricle is normal in size with severely decreased systolic function. The estimated ejection fraction is 20%.  · Normal right ventricular size with moderately to severely reduced right ventricular systolic function.  · Left ventricular diastolic dysfunction.  · Biatrial enlargement.  · The ascending aorta is mildly dilated.  · Mild mitral regurgitation.  · Mild  tricuspid regurgitation.  · The estimated PA systolic pressure is 51 mmHg.  · Intermediate central venous pressure (8 mmHg).    Echo with bubble did show intracardiac shunting        Cardiac/Vascular  A-fib  --new onset; suspect secondary to acute illness  --continue full dose anticoagulation  --low threshold for initiation of BB if in RVR    TTE 2/4/22:  · The left ventricle is normal in size with severely decreased systolic function. The estimated ejection fraction is 20%.  · Normal right ventricular size with moderately to severely reduced right ventricular systolic function.  · Left ventricular diastolic dysfunction.  · Biatrial enlargement.  · The ascending aorta is mildly dilated.  · Mild mitral regurgitation.  · Mild tricuspid regurgitation.  · The estimated PA systolic pressure is 51 mmHg.  · Intermediate central venous pressure (8 mmHg).    Echo bubble 2/7  · Severe left atrial enlargement.  · The left ventricle is normal in size with severely decreased systolic function.  · The estimated ejection fraction is 20%.  · Left ventricular diastolic dysfunction.  · Normal right ventricular size with mildly reduced right ventricular systolic function.  · Mild aortic regurgitation.  · Mild tricuspid regurgitation.  · Elevated central venous pressure (15 mmHg).  · The estimated PA systolic pressure is 40 mmHg.  · Study is positive for intracardiac shunt, Bubbles visualized in left atrium at 3 beats. Very sluggish flow noted in left atrium, thus in the setting of stroke and atrial fibrillation, would also consider KALI thrombus on differential for embolic stroke.  · Sluggish flow in left ventricle with systemic contrast, however no obvious thrombus visualized.            HTN (hypertension), benign  --Home amlodipine currently on hold  -- transitioning to comfort measures      Renal/  Hypernatremia  -transitioning to comfort measures    OSWALDO (acute kidney injury)  Patient Cr increasing, from 1.0 to 2.1 now coming down  to 1.7. Likely contrast induced or pre renal.    -transitioning to comfort measures    Endocrine  Hypothyroidism  --TSH 3.057 (2/4)      Other  Palliative care encounter  Family has decided on transition to comfort measures  - morphine drip  - pain and anxiety control    Elevated lactic acid level  --resolved. transitioning to comfort measures    Pneumonia due to COVID-19 virus  --Covid 19 positive on 1/26. Patient is not vaccinated.   --currently requiring 15L non-breather, has had increasing oxygen requirements.   --transitioning to comfort measures       Critical Care Daily Checklist:    A: Awake: RASS Goal/Actual Goal: RASS Goal: -2-->light sedation  Actual: Martin Agitation Sedation Scale (RASS): Light sedation   B: Spontaneous Breathing Trial Performed?     C: SAT & SBT Coordinated?                        D: Delirium: CAM-ICU Overall CAM-ICU: Positive   E: Early Mobility Performed? No   F: Feeding Goal: Goals: Meet % EEN, EPN by RD f/u date  Status: Nutrition Goal Status: new   Current Diet Order   Procedures    Diet NPO      AS: Analgesia/Sedation Precedex, seroquel, morphine drip, prn morphine and ativan   T: Thromboembolic Prophylaxis    H: HOB > 300 Yes   U: Stress Ulcer Prophylaxis (if needed)    G: Glucose Control    B: Bowel Function Stool Occurrence: 0   I: Indwelling Catheter (Lines & Valenzuela) Necessity Cath, PIV   D: De-escalation of Antimicrobials/Pharmacotherapies     Plan for the day/ETD Comfort measures    Code Status:  Family/Goals of Care: DNR  - transition to comfort       Critical secondary to Patient has a condition that poses threat to life and bodily function: Severe Respiratory Distress, embolic strokes      Critical care was time spent personally by me on the following activities: development of treatment plan with patient or surrogate and bedside caregivers, discussions with consultants, evaluation of patient's response to treatment, examination of patient, ordering and  performing treatments and interventions, ordering and review of laboratory studies, ordering and review of radiographic studies, pulse oximetry, re-evaluation of patient's condition. This critical care time did not overlap with that of any other provider or involve time for any procedures.     Kumar Marks,   Critical Care Medicine  [facility] ([facility])

## 2022-02-09 NOTE — ASSESSMENT & PLAN NOTE
Patient Cr increasing, from 1.0 to 2.1 now coming down to 1.7. Likely contrast induced or pre renal.    -transitioning to comfort measures

## 2022-02-14 NOTE — PHYSICIAN QUERY
PT Name: Lalo Samuel  MR #: [id number]     DOCUMENTATION CLARIFICATION     CDS/: Marisol Sung RN CCDS              Contact information:[email or web address]  This form is a permanent document in the medical record.     Query Date: February 14, 2022    By submitting this query, we are merely seeking further clarification of documentation.  Please utilize your independent clinical judgment when addressing the question(s) below.    The Medical Record contains the following   Indicators Supporting Clinical Findings Location in Medical Record   x Heart Failure documented Likely multifactorial in setting of Covid-19 infection and suspected HF (elevated BNP with reduced LVEF on bedside echo) H&P- Hospital progress note 2/8   x BNP XIB=175 Lab 2/3   x EF/Echo The left ventricle is normal in size with severely decreased systolic function.The estimated ejection fraction is 20%.  · Normal right ventricular size with moderately to severely reduced right ventricular systolic function.  · Left ventricular diastolic dysfunction.  · Biatrial enlargement.  · The ascending aorta is mildly dilated.  · Mild mitral regurgitation.  · Mild tricuspid regurgitation.  · The estimated PA systolic pressure is 51 mmHg.  · Intermediate central venous pressure (8 mmHg). Echo 2/4   x Radiology findings Patchy bilateral airspace opacities suggestive of infectious/inflammatory process or pulmonary edema in the appropriate clinical setting. CXR 2/3   x Subjective/Objective Respiratory Conditions * Acute hypoxemic respiratory failure  Pneumonia due to COVID-19 virus H&P-Discharge summary    Recent/Current MI      Heart Transplant, LVAD      Edema, JVD      Ascites     x Diuretics/Meds Lasix IV 60 mg 2/4    --x1 dose lasix on 2/4 with good UOP  --given elevated BNP and cxr concerning for pulm edema, recieved lasix x1 dose [facility] medicine PN 2/5-2/8    Other Treatment      Other       Heart failure is a clinical diagnosis which  includes symptomatic fluid retention, elevated intracardiac pressures, and/or the inability of the heart to deliver adequate blood flow.    Heart Failure with reduced Ejection Fraction (HFrEF) or Systolic Heart Failure (loses ability to contract normally, EF is <40%)    Heart Failure with preserved Ejection Fraction (HFpEF) or Diastolic Heart Failure (stiff ventricles, does not relax properly, EF is >50%)     Heart Failure with Combined Systolic and Diastolic Failure (stiff ventricles, does not relax properly and EF is <50%)    Mid-range or mildly reduced ejection fraction (HFmrEF) is classified as systolic heart failure.   Common clues to acute exacerbation:  Rapidly progressive symptoms (w/in 2 weeks of presentation), using IV diuretics, using supplemental O2, pulmonary edema on Xray, new or worsening pleural effusion, +JVD or other signs of volume overload, MI w/in 4 weeks, and/or BNP >500  The clinical guidelines noted are only system guidelines, and do not replace the providers clinical judgment.    Please clarify the type and acuity of suspected HF.  Thank you.    [   ]  Acute Systolic Heart Failure (HFrEF or HFmrEF) - new diagnosis   [   ]  Acute on Chronic Systolic Heart Failure (HFrEF or HFmrEF) - worsening of CHF signs/symptoms in preexisting CHF   [   ]  Chronic Systolic Heart Failure (HFrEF or HFmrEF) - preexisting and stable   [   ]  Acute Combined Systolic and Diastolic Heart Failure - new diagnosis   [ x  ]  Acute on Chronic Combined Systolic and Diastolic Heart Failure - worsening of CHF signs/symptoms in preexisting CHF   [   ]  Chronic Combined Systolic and Diastolic Heart Failure - pre-existing and stable   [   ]  Heart Failure Ruled Out   [   ]  Other (please specify): ___________________________________   [  ]  Clinically Undetermined         Please document in your progress notes daily for the duration of treatment until resolved and include in your discharge summary.    References:  American  Heart Association editorial staff. (2017, May). Ejection Fraction Heart Failure Measurement. American Heart Association. https://www.heart.org/en/health-topics/heart-failure/diagnosing-heart-failure/ejection-fraction-heart-failure-measurement#:~:text=Ejection%20fraction%20(EF)%20is%20a,pushed%20out%20with%20each%20heartbeat  HANNY Chand (2020, December 15). Heart failure with preserved ejection fraction: Clinical manifestations and diagnosis. [employer]. https://www.[employer]/contents/heart-failure-with-preserved-ejection-fraction-clinical-manifestations-and-diagnosis.  ICD-10-CM/PCS Coding Clinic Third Quarter ICD-10, Effective with discharges: September 8, 2020 [facility] § Heart failure with mid-range or mildly reduced ejection fraction (2020).  Form No. [id number]